# Patient Record
Sex: FEMALE | Race: WHITE | Employment: PART TIME | URBAN - METROPOLITAN AREA
[De-identification: names, ages, dates, MRNs, and addresses within clinical notes are randomized per-mention and may not be internally consistent; named-entity substitution may affect disease eponyms.]

---

## 2018-03-30 ENCOUNTER — OFFICE VISIT (OUTPATIENT)
Dept: OBGYN CLINIC | Age: 33
End: 2018-03-30

## 2018-03-30 VITALS
BODY MASS INDEX: 21.26 KG/M2 | DIASTOLIC BLOOD PRESSURE: 60 MMHG | SYSTOLIC BLOOD PRESSURE: 112 MMHG | WEIGHT: 120 LBS | HEIGHT: 63 IN

## 2018-03-30 DIAGNOSIS — O34.219 PREVIOUS CESAREAN DELIVERY, ANTEPARTUM CONDITION OR COMPLICATION: Primary | ICD-10-CM

## 2018-03-30 DIAGNOSIS — Z32.01 PREGNANCY EXAMINATION OR TEST, POSITIVE RESULT: ICD-10-CM

## 2018-03-30 DIAGNOSIS — Z34.82 ENCOUNTER FOR SUPERVISION OF OTHER NORMAL PREGNANCY, SECOND TRIMESTER: ICD-10-CM

## 2018-03-30 DIAGNOSIS — N92.6 MISSED MENSES: ICD-10-CM

## 2018-03-30 LAB
ANTIBODY SCREEN, EXTERNAL: NEGATIVE
CHLAMYDIA, EXTERNAL: NEGATIVE
HBSAG, EXTERNAL: NEGATIVE
HCT, EXTERNAL: 34.9
HGB, EXTERNAL: 11.6
HIV, EXTERNAL: NEGATIVE
N. GONORRHEA, EXTERNAL: NEGATIVE
PAP SMEAR, EXTERNAL: NORMAL
PLATELET CNT,   EXTERNAL: 239
RUBELLA, EXTERNAL: 2.59
T. PALLIDUM, EXTERNAL: NEGATIVE
TYPE, ABO & RH, EXTERNAL: NORMAL
URINALYSIS, EXTERNAL: NEGATIVE

## 2018-03-30 NOTE — PROGRESS NOTES
Current pregnancy history:    Yoly Talamantes is a 28 y.o. female who presents for the evaluation of pregnancy. Patient's last menstrual period was 2017 (exact date). LMP history:  The date of her LMP is certain. Her last menstrual period was normal and lasted for 4 to 5 days. A urine pregnancy test was positive several weeks ago. She was not on the pill at conception. Based on her LMP, her EDC is 18 and her EGA is 13 weeks, 6 days. Her menstrual cycles are regular and occur approximately every 28 days  and range from 3 to 5 days. The last menses lasted the usual number of days. Ultrasound data:  She had an  ultrasound done by the ultrasound tech today which revealed a viable balderrama pregnancy with a gestational age of 12 weeks and 5 days giving an [address] of 18. Pregnancy symptoms:    Since her LMP she has experienced  urinary frequency, breast tenderness, and nausea. She has been vomiting over the last few weeks. Associated signs and symptoms which she denies: dysuria, discharge, vaginal bleeding. She states she has not gained weight:  Approximately 0 pounds over the last few weeks. Relevant past pregnancy history:   She has the following pregnancy history Her last pregnancy was uncomplicated scheduled repeat C/S. She has no history of  delivery. Relevant past medical history:(relevant to this pregnancy): noncontributory. Pap/Occupational history:  Last pap smear: 3 years ago Results: Normal      Her occupation is: Works at [employer]. Substance history: negative for alcohol and street drugs. Does smoke tobacco            Positive for nothing else  Exposure history: There is/are no indoor cat/s in the home. The patient was instructed to not change the cat litter. She admits close contact with children on a regular basis. She has had chicken pox or the vaccine in the past.   Patient denies issues with domestic violence.      Genetic Screening/Teratology Counseling: (Includes patient, baby's father, or anyone in either family with:)  3.  Patient's age >/= 28 at [facility]?-- no  .   2. Thalassemia ([facility] [place], [address], or  background): MCV<80?--no.     3.  Neural tube defect (meningomyelocele, spina bifida, anencephaly)?--no.   4.  Congenital heart defect?--no.  5.  Down syndrome?--no.   6.  Adrian-Sachs (Latter day, [place])?--no.   7.  Canavan's Disease?--no.   8.  Familial Dysautonomia?--no.   9.  Sickle cell disease or trait ()? --no   The patient has not been tested for sickle trait  10. Hemophilia or other blood disorders?--no. 11.  Muscular dystrophy?--no. 12.  Cystic fibrosis?--no. 13.  [place]'s Chorea?--no. 14.  Mental retardation/autism (if yes was person tested for Fragile X)?--no. 15.  Other inherited genetic or chromosomal disorder?--no. 12.  Maternal metabolic disorder (DM, PKU, etc)?--no. 17.  Patient or FOB with a child with a birth defect not listed above?--no.  17a. Patient or FOB with a birth defect themselves?--no. 18.  Recurrent pregnancy loss, or stillbirth?--no. 19.  Any medications since LMP other than prenatal vitamins (include vitamins, supplements, OTC meds, drugs, alcohol)?--no. 20.  Any other genetic/environmental exposure to discuss?--no. Infection History:  1. Lives with someone with TB or TB exposed?--no.   2.  Patient or partner has history of genital herpes?--no.  3.  Rash or viral illness since LMP?--no.    4.  History of STD (GC, CT, HPV, syphilis, HIV)? --Trich  5. Other: OTHER? Past Medical History:   Diagnosis Date    Abnormal Pap smear 11    LGSIL + HPV    ADHD (attention deficit hyperactivity disorder)     HPV test positive 11    Postpartum depression     Psychiatric problem     drug addiction, initial dependency 5 years ago. .methadone [place]    Trichomonas infection 2014     Past Surgical History:   Procedure Laterality Date     DELIVERY ONLY  2008    HX DILATION AND CURETTAGE      HX OTHER SURGICAL      tonsils    HX OTHER SURGICAL      wisdom teeth extraction     Social History     Occupational History    Not on file. Social History Main Topics    Smoking status: Current Every Day Smoker     Packs/day: 1.00     Years: 8.00    Smokeless tobacco: Never Used    Alcohol use No    Drug use: Yes     Special: Other    Sexual activity: Yes     Partners: Male     Birth control/ protection: None      Comment: methadone     Family History   Problem Relation Age of Onset    Drug Abuse Mother     Drug Abuse Father        Allergies   Allergen Reactions    Cephalosporins Hives    Codeine Hives    Pcn [Penicillins] Hives     Prior to Admission medications    Medication Sig Start Date End Date Taking? Authorizing Provider   albuterol (PROVENTIL HFA, VENTOLIN HFA, PROAIR HFA) 90 mcg/actuation inhaler Take 1-2 Puffs by inhalation every four (4) hours as needed for Wheezing. 9/27/15   Pieter Levering Flasschoen, PA   Dextromethorphan-guaiFENesin [facility] DM) 60-1,200 mg TM12 Take 1 Tab by mouth every twelve (12) hours as needed (congestion). 9/27/15   Pieter Levering Flasschoen, PA   amphetamine-dextroamphetamine (ADDERALL) 20 mg tablet Take 20 mg by mouth two (2) times a day.     Indications: ATTENTION-DEFICIT HYPERACTIVITY DISORDER    Historical Provider        Review of Systems: History obtained from the patient  Constitutional: negative for weight loss, fever, night sweats  HEENT: negative for hearing loss, earache, congestion, snoring, sorethroat  CV: negative for chest pain, palpitations, edema  Resp: negative for cough, shortness of breath, wheezing  Breast: negative for breast lumps, nipple discharge, galactorrhea  GI: negative for change in bowel habits, abdominal pain, black or bloody stools  : negative for frequency, dysuria, hematuria, vaginal discharge  MSK: negative for back pain, joint pain, muscle pain  Skin: negative for itching, rash, hives  Neuro: negative for dizziness, headache, confusion, weakness  Psych: negative for anxiety, depression, change in mood  Heme/lymph: negative for bleeding, bruising, pallor    Objective:  Visit Vitals    /60    Ht 5' 3\" (1.6 m)    Wt 120 lb (54.4 kg)    LMP 12/23/2017 (Exact Date)    BMI 21.26 kg/m2       Physical Exam:   PHYSICAL EXAMINATION    Constitutional  · Appearance: well-nourished, well developed, alert, in no acute distress    HENT  · Head  · Face: appears normal  · Eyes: appear normal  · Ears: normal  · Mouth: normal  · Lips: no lesions    Neck  · Inspection/Palpation: normal appearance, no masses or tenderness  · Lymph Nodes: no lymphadenopathy present  · Thyroid: gland size normal, nontender, no nodules or masses present on palpation    Chest  · Respiratory Effort: breathing unlabored  · Auscultation: normal breath sounds    Cardiovascular  · Heart:  · Auscultation: regular rate and rhythm without murmur    Breasts  · Inspection of Breasts: breasts symmetrical, no skin changes, no discharge present, nipple appearance normal, no skin retraction present  · Palpation of Breasts and Axillae: no masses present on palpation, no breast tenderness  · Axillary Lymph Nodes: no lymphadenopathy present    Gastrointestinal  · Abdominal Examination: abdomen non-tender to palpation, normal bowel sounds, no masses present  · Liver and spleen: no hepatomegaly present, spleen not palpable  · Hernias: no hernias identified    Genitourinary  · External Genitalia: normal appearance for age, no discharge present, no tenderness present, no inflammatory lesions present, no masses present, no atrophy present  · Vagina: normal vaginal vault without central or paravaginal defects, no discharge present, no inflammatory lesions present, no masses present  · Bladder: non-tender to palpation  · Urethra: appears normal  · Cervix: normal   · Uterus: enlarged, normal shape, soft  · Adnexa: no adnexal tenderness present, no adnexal masses present  · Perineum: perineum within normal limits, no evidence of trauma, no rashes or skin lesions present  · Anus: anus within normal limits, no hemorrhoids present  · Inguinal Lymph Nodes: no lymphadenopathy present    Skin  · General Inspection: no rash, no lesions identified    Neurologic/Psychiatric  · Mental Status:  · Orientation: grossly oriented to person, place and time  · Mood and Affect: mood normal, affect appropriate    Assessment:   Intrauterine pregnancy with the following problems identified: Previous C/S X 2 for repeat/ on Suboxone 8 mg/smoker        Plan:   Discussed weaning off suboxone during pregnancy. Advised not usually recommended but OK with me if OK with prescriber. Offered CF testing, CVS, Nuchal Translucency, MSAFP, amnio, and discussed NIPT  Course of pregnancy discussed including visit schedule, routine U/S, glucola testing, etc.  Avoid alcoholic beverages and illicit/recreational drugs use  Take prenatal vitamins or folic acid daily. Hospital and practice style discussed with coverage system. Discussed nutrition, toxoplasmosis precautions, sexual activity, exercise, need for influenza vaccine, environmental and work hazards, travel advice, screen for domestic violence, need for seat belts. Discussed seafood, unpasteurized dairy products, deli meat, artificial sweeteners, and caffeine. Information on prenatal classes/breastfeeding given. Information on circumcision given  Patient encouraged not to smoke. Discussed current prescription drug use. Given medication list.  Discussed the use of over the counter medications and chemicals. Route of delivery discussed, including risks, benefits, and alternatives of  versus repeat LTCS. Pt understands risk of hemorrhage during pregnancy and post delivery and would accept blood products if necessary in life-threatening emergencies      Handouts given to pt.

## 2018-04-01 LAB — BACTERIA UR CULT: NO GROWTH

## 2018-04-03 LAB
ABO GROUP BLD: NORMAL
BLD GP AB SCN SERPL QL: NEGATIVE
ERYTHROCYTE [DISTWIDTH] IN BLOOD BY AUTOMATED COUNT: 13.4 % (ref 12.3–15.4)
HBV SURFACE AG SERPL QL IA: NEGATIVE
HCT VFR BLD AUTO: 34.9 % (ref 34–46.6)
HGB BLD-MCNC: 11.6 G/DL (ref 11.1–15.9)
HIV 1+2 AB+HIV1 P24 AG SERPL QL IA: NON REACTIVE
MCH RBC QN AUTO: 31.8 PG (ref 26.6–33)
MCHC RBC AUTO-ENTMCNC: 33.2 G/DL (ref 31.5–35.7)
MCV RBC AUTO: 96 FL (ref 79–97)
PLATELET # BLD AUTO: 239 X10E3/UL (ref 150–379)
RBC # BLD AUTO: 3.65 X10E6/UL (ref 3.77–5.28)
RH BLD: POSITIVE
RUBV IGG SERPL IA-ACNC: 2.59 INDEX
T PALLIDUM AB SER QL IA: NEGATIVE
WBC # BLD AUTO: 9.8 X10E3/UL (ref 3.4–10.8)

## 2018-04-04 LAB
C TRACH RRNA CVX QL NAA+PROBE: NEGATIVE
CYTOLOGIST CVX/VAG CYTO: ABNORMAL
CYTOLOGY CVX/VAG DOC THIN PREP: ABNORMAL
CYTOLOGY HISTORY:: ABNORMAL
DX ICD CODE: ABNORMAL
DX ICD CODE: ABNORMAL
HPV I/H RISK 4 DNA CVX QL PROBE+SIG AMP: POSITIVE
Lab: ABNORMAL
N GONORRHOEA RRNA CVX QL NAA+PROBE: NEGATIVE
OTHER STN SPEC: ABNORMAL
PATH REPORT.FINAL DX SPEC: ABNORMAL
PATHOLOGIST CVX/VAG CYTO: ABNORMAL
STAT OF ADQ CVX/VAG CYTO-IMP: ABNORMAL
T VAGINALIS RRNA SPEC QL NAA+PROBE: NEGATIVE

## 2018-04-06 ENCOUNTER — TELEPHONE (OUTPATIENT)
Dept: OBGYN CLINIC | Age: 33
End: 2018-04-06

## 2018-04-06 ENCOUNTER — LAB ONLY (OUTPATIENT)
Dept: OBGYN CLINIC | Age: 33
End: 2018-04-06

## 2018-04-06 DIAGNOSIS — Z34.82 ENCOUNTER FOR SUPERVISION OF OTHER NORMAL PREGNANCY IN SECOND TRIMESTER: Primary | ICD-10-CM

## 2018-04-06 LAB — AFPT, MATERNAL, EXTERNAL: NEGATIVE

## 2018-04-06 NOTE — LETTER
4/6/2018 4:00 PM 
 
Ms. Dylon Syed [address] [address] To Dental Consultant, 
 
Dylon Syed is in need of dental care. This patient is obstetrically cleared for dental procedures with the following considerations: 1. Please use an abdominal and thyroid shield when performing dental x-rays. 2.  Broad spectrum Penicillins or Cephalosporins and Tylenol products my be 
                 used as needed. Tetracyclines, Nsaids and Quinolones are contraindicated  
                 in pregnancy. 3.  Local Anesthesia without Epinepherine is permissible. Sincerely, Gregg Ambrocio MD

## 2018-04-10 LAB
2ND TRIMESTER 4 SCREEN SERPL-IMP: NORMAL
2ND TRIMESTER 4 SCREEN SERPL-IMP: NORMAL
AFP ADJ MOM SERPL: 0.83
AFP SERPL-MCNC: 29.1 NG/ML
AGE AT DELIVERY: 32.9 YR
COMMENTS, 018014: NORMAL
FET TS 18 RISK FROM MAT AGE: NORMAL
FET TS 21 RISK FROM MAT AGE: 452
GA METHOD: NORMAL
GA: 15.5 WEEKS
HCG ADJ MOM SERPL: 0.82
HCG SERPL-ACNC: NORMAL MIU/ML
IDDM PATIENT QL: NO
INHIBIN A ADJ MOM SERPL: 0.75
INHIBIN A SERPL-MCNC: 150.73 PG/ML
MULTIPLE PREGNANCY: NO
NEURAL TUBE DEFECT RISK FETUS: NORMAL %
RESULTS, 017389: NORMAL
TS 18 RISK FETUS: NORMAL
TS 21 RISK FETUS: 2822
U ESTRIOL ADJ MOM SERPL: 0.77
U ESTRIOL SERPL-MCNC: 0.54 NG/ML

## 2018-04-10 NOTE — PROGRESS NOTES
Notified pt of results. She verbalized understanding. Colpo will be performed at her next appointment.

## 2018-05-04 ENCOUNTER — ROUTINE PRENATAL (OUTPATIENT)
Dept: OBGYN CLINIC | Age: 33
End: 2018-05-04

## 2018-05-04 VITALS
BODY MASS INDEX: 21.62 KG/M2 | HEIGHT: 63 IN | SYSTOLIC BLOOD PRESSURE: 98 MMHG | WEIGHT: 122 LBS | DIASTOLIC BLOOD PRESSURE: 60 MMHG

## 2018-05-04 DIAGNOSIS — R87.612 LOW GRADE SQUAMOUS INTRAEPITHELIAL LESION (LGSIL) ON PAPANICOLAOU SMEAR OF CERVIX: ICD-10-CM

## 2018-05-04 DIAGNOSIS — Z34.82 ENCOUNTER FOR SUPERVISION OF OTHER NORMAL PREGNANCY, SECOND TRIMESTER: Primary | ICD-10-CM

## 2018-05-04 DIAGNOSIS — R87.810 CERVICAL HIGH RISK HPV (HUMAN PAPILLOMAVIRUS) TEST POSITIVE: ICD-10-CM

## 2018-05-04 NOTE — PROGRESS NOTES
[facility]  OFFICE PROCEDURE PROGRESS NOTE           Chart reviewed for the following:  Maddie YUSUF, have reviewed the History, Physical and updated the Allergic reactions for Sandrine performed immediately prior to start of procedure:  Josee Valencia, have performed the following reviews on Delwyn July prior to the start of the procedure:      * Patient was identified by name and date of birth   * Agreement on procedure being performed was verified  * Risks and Benefits explained to the patient  * Procedure site verified and marked as necessary  * Patient was positioned for comfort  * Consent was signed and verified      Time: 9:40am        Date of procedure: 5/4/2018     Procedure performed by: Gregg Ambrocio MD     Provider assisted by: Maddie Arvizu MA     Patient assisted by: self     How tolerated by patient: tolerated the procedure well with no complications     Post Procedural Pain Scale: 0 - No Hurt     Comments: none    Procedure Note: Colposcopy during pregnancy    Delwyn July is a G6 ,  28 y.o. female [facility] Patient's last menstrual period was 12/23/2017 (exact date). She presents for colposcopy for evaluation of a cervical abnormality with a pap smear abnormality consisting of HPV and LGSIL. The patient is pregnant. After being presented with the risks, benefits and alternatives has she signed a consent for the procedure. She states that she understands the need for the procedure and has no further questions. She was informed that she may experience discomfort. Procedure:  She was positioned in the dorsal lithotomy position and a speculum was inserted into the vagina. Dilute acetic acid was applied to the cervix. The colposcope was used to visualize the cervix. Procedure: The transformation zone was completely visualized. Findings: This colposcopy was satisfactory.  The procedure was notable for white epithelium on the posterior cervix. Biopsies were not taken from the cervix. See accompanying diagram. Nothing was applied to the cervix for hemostasis. Endocervical currettage: An endocervical curettage was not performed. Post Procedure Status: The patient tolerated the procedure well with minimal discomfort. She was observed for 10 minutes and released in good condition.

## 2018-06-01 ENCOUNTER — ROUTINE PRENATAL (OUTPATIENT)
Dept: OBGYN CLINIC | Age: 33
End: 2018-06-01

## 2018-06-01 VITALS
WEIGHT: 121 LBS | BODY MASS INDEX: 21.44 KG/M2 | HEIGHT: 63 IN | SYSTOLIC BLOOD PRESSURE: 116 MMHG | DIASTOLIC BLOOD PRESSURE: 60 MMHG

## 2018-06-01 DIAGNOSIS — Z34.82 ENCOUNTER FOR SUPERVISION OF OTHER NORMAL PREGNANCY, SECOND TRIMESTER: Primary | ICD-10-CM

## 2018-07-02 ENCOUNTER — ROUTINE PRENATAL (OUTPATIENT)
Dept: OBGYN CLINIC | Age: 33
End: 2018-07-02

## 2018-07-02 VITALS
DIASTOLIC BLOOD PRESSURE: 60 MMHG | SYSTOLIC BLOOD PRESSURE: 108 MMHG | BODY MASS INDEX: 22.15 KG/M2 | WEIGHT: 125 LBS | HEIGHT: 63 IN

## 2018-07-02 DIAGNOSIS — Z34.83 ENCOUNTER FOR SUPERVISION OF OTHER NORMAL PREGNANCY IN THIRD TRIMESTER: Primary | ICD-10-CM

## 2018-07-02 DIAGNOSIS — Z23 ENCOUNTER FOR IMMUNIZATION: ICD-10-CM

## 2018-07-02 LAB
GTT, 1 HR, GLUCOLA, EXTERNAL: 88
HCT, EXTERNAL: 32.6
HGB, EXTERNAL: 10.7
PLATELET CNT,   EXTERNAL: 222

## 2018-07-02 NOTE — PROGRESS NOTES
28 year   28w1d pregnant patient given 0.5ml t-dap injection as per MD order. Patient signed consent. Patient tolerated injection in right deltoid with out complications.

## 2018-07-03 LAB
ERYTHROCYTE [DISTWIDTH] IN BLOOD BY AUTOMATED COUNT: 13.9 % (ref 12.3–15.4)
GLUCOSE 1H P 50 G GLC PO SERPL-MCNC: 88 MG/DL (ref 65–129)
HCT VFR BLD AUTO: 32.6 % (ref 34–46.6)
HGB BLD-MCNC: 10.7 G/DL (ref 11.1–15.9)
MCH RBC QN AUTO: 31.8 PG (ref 26.6–33)
MCHC RBC AUTO-ENTMCNC: 32.8 G/DL (ref 31.5–35.7)
MCV RBC AUTO: 97 FL (ref 79–97)
PLATELET # BLD AUTO: 222 X10E3/UL (ref 150–379)
RBC # BLD AUTO: 3.36 X10E6/UL (ref 3.77–5.28)
WBC # BLD AUTO: 13.1 X10E3/UL (ref 3.4–10.8)

## 2018-07-30 ENCOUNTER — ROUTINE PRENATAL (OUTPATIENT)
Dept: OBGYN CLINIC | Age: 33
End: 2018-07-30

## 2018-07-30 VITALS
WEIGHT: 128 LBS | SYSTOLIC BLOOD PRESSURE: 104 MMHG | HEIGHT: 63 IN | DIASTOLIC BLOOD PRESSURE: 60 MMHG | BODY MASS INDEX: 22.68 KG/M2

## 2018-07-30 DIAGNOSIS — Z34.83 ENCOUNTER FOR SUPERVISION OF OTHER NORMAL PREGNANCY IN THIRD TRIMESTER: Primary | ICD-10-CM

## 2018-08-13 ENCOUNTER — ROUTINE PRENATAL (OUTPATIENT)
Dept: OBGYN CLINIC | Age: 33
End: 2018-08-13

## 2018-08-13 VITALS
DIASTOLIC BLOOD PRESSURE: 60 MMHG | WEIGHT: 132 LBS | SYSTOLIC BLOOD PRESSURE: 112 MMHG | BODY MASS INDEX: 23.39 KG/M2 | HEIGHT: 63 IN

## 2018-08-13 DIAGNOSIS — Z34.83 ENCOUNTER FOR SUPERVISION OF OTHER NORMAL PREGNANCY IN THIRD TRIMESTER: Primary | ICD-10-CM

## 2018-08-21 ENCOUNTER — ROUTINE PRENATAL (OUTPATIENT)
Dept: OBGYN CLINIC | Age: 33
End: 2018-08-21

## 2018-08-21 VITALS
SYSTOLIC BLOOD PRESSURE: 95 MMHG | HEART RATE: 85 BPM | WEIGHT: 131 LBS | HEIGHT: 63 IN | BODY MASS INDEX: 23.21 KG/M2 | DIASTOLIC BLOOD PRESSURE: 43 MMHG

## 2018-08-21 DIAGNOSIS — Z34.83 ENCOUNTER FOR SUPERVISION OF OTHER NORMAL PREGNANCY IN THIRD TRIMESTER: Primary | ICD-10-CM

## 2018-08-21 LAB — GRBS, EXTERNAL: NEGATIVE

## 2018-08-21 NOTE — PATIENT INSTRUCTIONS

## 2018-08-24 LAB — GP B STREP DNA SPEC QL NAA+PROBE: NEGATIVE

## 2018-08-30 ENCOUNTER — ROUTINE PRENATAL (OUTPATIENT)
Dept: OBGYN CLINIC | Age: 33
End: 2018-08-30

## 2018-08-30 VITALS
DIASTOLIC BLOOD PRESSURE: 60 MMHG | WEIGHT: 132 LBS | HEIGHT: 63 IN | BODY MASS INDEX: 23.39 KG/M2 | SYSTOLIC BLOOD PRESSURE: 102 MMHG

## 2018-08-30 DIAGNOSIS — Z34.83 ENCOUNTER FOR SUPERVISION OF OTHER NORMAL PREGNANCY IN THIRD TRIMESTER: Primary | ICD-10-CM

## 2018-09-06 ENCOUNTER — ROUTINE PRENATAL (OUTPATIENT)
Dept: OBGYN CLINIC | Age: 33
End: 2018-09-06

## 2018-09-06 VITALS
WEIGHT: 132.25 LBS | HEIGHT: 63 IN | DIASTOLIC BLOOD PRESSURE: 58 MMHG | SYSTOLIC BLOOD PRESSURE: 106 MMHG | BODY MASS INDEX: 23.43 KG/M2

## 2018-09-06 DIAGNOSIS — Z34.83 ENCOUNTER FOR SUPERVISION OF OTHER NORMAL PREGNANCY IN THIRD TRIMESTER: Primary | ICD-10-CM

## 2018-09-13 ENCOUNTER — ROUTINE PRENATAL (OUTPATIENT)
Dept: OBGYN CLINIC | Age: 33
End: 2018-09-13

## 2018-09-13 VITALS — BODY MASS INDEX: 23.38 KG/M2 | WEIGHT: 132 LBS | SYSTOLIC BLOOD PRESSURE: 104 MMHG | DIASTOLIC BLOOD PRESSURE: 60 MMHG

## 2018-09-13 DIAGNOSIS — Z3A.38 38 WEEKS GESTATION OF PREGNANCY: ICD-10-CM

## 2018-09-13 DIAGNOSIS — Z34.83 ENCOUNTER FOR SUPERVISION OF OTHER NORMAL PREGNANCY, THIRD TRIMESTER: ICD-10-CM

## 2018-09-13 DIAGNOSIS — O34.212 ENCOUNTER FOR MATERNAL CARE FOR VERTICAL SCAR FROM PREVIOUS CESAREAN DELIVERY: Primary | ICD-10-CM

## 2018-09-13 RX ORDER — BUPRENORPHINE HYDROCHLORIDE 8 MG/1
8 TABLET SUBLINGUAL 3 TIMES DAILY
COMMUNITY

## 2018-09-13 NOTE — LETTER
2018 3:13 PM 
 
Ms. Moshe Patterson [address] [address] To whom it may concern, 
 
Ms. Moshe Patterson will start maternity leave on 2018. Please call our office with any questions. Sincerely, Wylie Apgar, MD

## 2018-09-14 ENCOUNTER — ANESTHESIA (OUTPATIENT)
Dept: LABOR AND DELIVERY | Age: 33
DRG: 540 | End: 2018-09-14
Payer: MEDICAID

## 2018-09-14 ENCOUNTER — ANESTHESIA EVENT (OUTPATIENT)
Dept: LABOR AND DELIVERY | Age: 33
DRG: 540 | End: 2018-09-14
Payer: MEDICAID

## 2018-09-14 ENCOUNTER — HOSPITAL ENCOUNTER (INPATIENT)
Age: 33
LOS: 3 days | Discharge: HOME | DRG: 540 | End: 2018-09-17
Attending: OBSTETRICS & GYNECOLOGY | Admitting: OBSTETRICS & GYNECOLOGY
Payer: MEDICAID

## 2018-09-14 LAB
ABO + RH BLD: NORMAL
AMPHET UR QL SCN: NEGATIVE
BARBITURATES UR QL SCN: NEGATIVE
BASOPHILS # BLD: 0.1 K/UL (ref 0–0.1)
BASOPHILS NFR BLD: 0 % (ref 0–1)
BENZODIAZ UR QL: NEGATIVE
BLOOD GROUP ANTIBODIES SERPL: NORMAL
CANNABINOIDS UR QL SCN: NEGATIVE
COCAINE UR QL SCN: NEGATIVE
DAILY QC (YES/NO)?: YES
DIFFERENTIAL METHOD BLD: ABNORMAL
DRUG SCRN COMMENT,DRGCM: NORMAL
EOSINOPHIL # BLD: 0.1 K/UL (ref 0–0.4)
EOSINOPHIL NFR BLD: 1 % (ref 0–7)
ERYTHROCYTE [DISTWIDTH] IN BLOOD BY AUTOMATED COUNT: 14 % (ref 11.5–14.5)
HCT VFR BLD AUTO: 31.8 % (ref 35–47)
HGB BLD-MCNC: 10.9 G/DL (ref 11.5–16)
IMM GRANULOCYTES # BLD: 0.2 K/UL (ref 0–0.04)
IMM GRANULOCYTES NFR BLD AUTO: 1 % (ref 0–0.5)
LYMPHOCYTES # BLD: 2.2 K/UL (ref 0.8–3.5)
LYMPHOCYTES NFR BLD: 18 % (ref 12–49)
MCH RBC QN AUTO: 33.1 PG (ref 26–34)
MCHC RBC AUTO-ENTMCNC: 34.3 G/DL (ref 30–36.5)
MCV RBC AUTO: 96.7 FL (ref 80–99)
METHADONE UR QL: NEGATIVE
MONOCYTES # BLD: 0.8 K/UL (ref 0–1)
MONOCYTES NFR BLD: 7 % (ref 5–13)
NEUTS SEG # BLD: 8.5 K/UL (ref 1.8–8)
NEUTS SEG NFR BLD: 72 % (ref 32–75)
NRBC # BLD: 0 K/UL (ref 0–0.01)
NRBC BLD-RTO: 0 PER 100 WBC
OPIATES UR QL: NEGATIVE
PCP UR QL: NEGATIVE
PH, VAGINAL FLUID: 7 (ref 5–6.1)
PLATELET # BLD AUTO: 238 K/UL (ref 150–400)
PMV BLD AUTO: 10.1 FL (ref 8.9–12.9)
RBC # BLD AUTO: 3.29 M/UL (ref 3.8–5.2)
SPECIMEN EXP DATE BLD: NORMAL
WBC # BLD AUTO: 11.9 K/UL (ref 3.6–11)

## 2018-09-14 PROCEDURE — 36415 COLL VENOUS BLD VENIPUNCTURE: CPT | Performed by: OBSTETRICS & GYNECOLOGY

## 2018-09-14 PROCEDURE — 74011250636 HC RX REV CODE- 250/636: Performed by: ANESTHESIOLOGY

## 2018-09-14 PROCEDURE — 74011250636 HC RX REV CODE- 250/636

## 2018-09-14 PROCEDURE — 74011250637 HC RX REV CODE- 250/637: Performed by: OBSTETRICS & GYNECOLOGY

## 2018-09-14 PROCEDURE — 76010000391 HC C SECN FIRST 1 HR: Performed by: OBSTETRICS & GYNECOLOGY

## 2018-09-14 PROCEDURE — 74011250636 HC RX REV CODE- 250/636: Performed by: OBSTETRICS & GYNECOLOGY

## 2018-09-14 PROCEDURE — 65270000029 HC RM PRIVATE

## 2018-09-14 PROCEDURE — 83986 ASSAY PH BODY FLUID NOS: CPT | Performed by: OBSTETRICS & GYNECOLOGY

## 2018-09-14 PROCEDURE — 77030007866 HC KT SPN ANES BBMI -B: Performed by: ANESTHESIOLOGY

## 2018-09-14 PROCEDURE — 86850 RBC ANTIBODY SCREEN: CPT | Performed by: OBSTETRICS & GYNECOLOGY

## 2018-09-14 PROCEDURE — 77030032490 HC SLV COMPR SCD KNE COVD -B

## 2018-09-14 PROCEDURE — 85025 COMPLETE CBC W/AUTO DIFF WBC: CPT | Performed by: OBSTETRICS & GYNECOLOGY

## 2018-09-14 PROCEDURE — 77030018836 HC SOL IRR NACL ICUM -A

## 2018-09-14 PROCEDURE — 75410000003 HC RECOV DEL/VAG/CSECN EA 0.5 HR: Performed by: OBSTETRICS & GYNECOLOGY

## 2018-09-14 PROCEDURE — 74011000250 HC RX REV CODE- 250

## 2018-09-14 PROCEDURE — 75410000002 HC LABOR FEE PER 1 HR: Performed by: OBSTETRICS & GYNECOLOGY

## 2018-09-14 PROCEDURE — 80307 DRUG TEST PRSMV CHEM ANLYZR: CPT | Performed by: OBSTETRICS & GYNECOLOGY

## 2018-09-14 PROCEDURE — 77030034850

## 2018-09-14 PROCEDURE — 77030008467 HC STPLR SKN COVD -B

## 2018-09-14 PROCEDURE — 76060000078 HC EPIDURAL ANESTHESIA: Performed by: OBSTETRICS & GYNECOLOGY

## 2018-09-14 PROCEDURE — 99283 EMERGENCY DEPT VISIT LOW MDM: CPT

## 2018-09-14 RX ORDER — DIPHENHYDRAMINE HCL 25 MG
25 CAPSULE ORAL
Status: DISCONTINUED | OUTPATIENT
Start: 2018-09-14 | End: 2018-09-17 | Stop reason: HOSPADM

## 2018-09-14 RX ORDER — SODIUM CHLORIDE 0.9 % (FLUSH) 0.9 %
5-10 SYRINGE (ML) INJECTION AS NEEDED
Status: DISCONTINUED | OUTPATIENT
Start: 2018-09-14 | End: 2018-09-14 | Stop reason: HOSPADM

## 2018-09-14 RX ORDER — OXYCODONE HYDROCHLORIDE 5 MG/1
10 TABLET ORAL
Status: DISCONTINUED | OUTPATIENT
Start: 2018-09-14 | End: 2018-09-14

## 2018-09-14 RX ORDER — DIPHENHYDRAMINE HYDROCHLORIDE 50 MG/ML
12.5 INJECTION, SOLUTION INTRAMUSCULAR; INTRAVENOUS
Status: DISPENSED | OUTPATIENT
Start: 2018-09-14 | End: 2018-09-15

## 2018-09-14 RX ORDER — SODIUM CHLORIDE 0.9 % (FLUSH) 0.9 %
5-10 SYRINGE (ML) INJECTION EVERY 8 HOURS
Status: DISCONTINUED | OUTPATIENT
Start: 2018-09-14 | End: 2018-09-15

## 2018-09-14 RX ORDER — MORPHINE SULFATE 0.5 MG/ML
INJECTION, SOLUTION EPIDURAL; INTRATHECAL; INTRAVENOUS AS NEEDED
Status: DISCONTINUED | OUTPATIENT
Start: 2018-09-14 | End: 2018-09-14 | Stop reason: HOSPADM

## 2018-09-14 RX ORDER — SODIUM CHLORIDE, SODIUM LACTATE, POTASSIUM CHLORIDE, CALCIUM CHLORIDE 600; 310; 30; 20 MG/100ML; MG/100ML; MG/100ML; MG/100ML
125 INJECTION, SOLUTION INTRAVENOUS CONTINUOUS
Status: DISCONTINUED | OUTPATIENT
Start: 2018-09-14 | End: 2018-09-17 | Stop reason: HOSPADM

## 2018-09-14 RX ORDER — IBUPROFEN 200 MG
1 TABLET ORAL DAILY
Status: DISCONTINUED | OUTPATIENT
Start: 2018-09-14 | End: 2018-09-17 | Stop reason: HOSPADM

## 2018-09-14 RX ORDER — SODIUM CHLORIDE 0.9 % (FLUSH) 0.9 %
5-10 SYRINGE (ML) INJECTION AS NEEDED
Status: DISCONTINUED | OUTPATIENT
Start: 2018-09-14 | End: 2018-09-17 | Stop reason: HOSPADM

## 2018-09-14 RX ORDER — ZOLPIDEM TARTRATE 5 MG/1
5 TABLET ORAL
Status: DISCONTINUED | OUTPATIENT
Start: 2018-09-14 | End: 2018-09-17 | Stop reason: HOSPADM

## 2018-09-14 RX ORDER — OXYTOCIN/RINGER'S LACTATE 20/1000 ML
125-500 PLASTIC BAG, INJECTION (ML) INTRAVENOUS ONCE
Status: ACTIVE | OUTPATIENT
Start: 2018-09-14 | End: 2018-09-15

## 2018-09-14 RX ORDER — OXYCODONE HYDROCHLORIDE 5 MG/1
5 TABLET ORAL
Status: DISCONTINUED | OUTPATIENT
Start: 2018-09-14 | End: 2018-09-14

## 2018-09-14 RX ORDER — CLINDAMYCIN PHOSPHATE 900 MG/50ML
900 INJECTION, SOLUTION INTRAVENOUS ONCE
Status: COMPLETED | OUTPATIENT
Start: 2018-09-14 | End: 2018-09-14

## 2018-09-14 RX ORDER — SODIUM CHLORIDE, SODIUM LACTATE, POTASSIUM CHLORIDE, CALCIUM CHLORIDE 600; 310; 30; 20 MG/100ML; MG/100ML; MG/100ML; MG/100ML
1000 INJECTION, SOLUTION INTRAVENOUS CONTINUOUS
Status: DISCONTINUED | OUTPATIENT
Start: 2018-09-14 | End: 2018-09-14 | Stop reason: HOSPADM

## 2018-09-14 RX ORDER — BUPRENORPHINE 2 MG/1
8 TABLET SUBLINGUAL 3 TIMES DAILY
Status: DISCONTINUED | OUTPATIENT
Start: 2018-09-15 | End: 2018-09-14

## 2018-09-14 RX ORDER — BUPRENORPHINE HYDROCHLORIDE 8 MG/1
24 TABLET SUBLINGUAL DAILY
Status: DISCONTINUED | OUTPATIENT
Start: 2018-09-15 | End: 2018-09-14

## 2018-09-14 RX ORDER — KETOROLAC TROMETHAMINE 30 MG/ML
30 INJECTION, SOLUTION INTRAMUSCULAR; INTRAVENOUS
Status: DISPENSED | OUTPATIENT
Start: 2018-09-14 | End: 2018-09-15

## 2018-09-14 RX ORDER — SWAB
1 SWAB, NON-MEDICATED MISCELLANEOUS DAILY
Status: DISCONTINUED | OUTPATIENT
Start: 2018-09-15 | End: 2018-09-17 | Stop reason: HOSPADM

## 2018-09-14 RX ORDER — NALOXONE HYDROCHLORIDE 0.4 MG/ML
0.2 INJECTION, SOLUTION INTRAMUSCULAR; INTRAVENOUS; SUBCUTANEOUS
Status: DISCONTINUED | OUTPATIENT
Start: 2018-09-14 | End: 2018-09-14

## 2018-09-14 RX ORDER — SIMETHICONE 80 MG
80 TABLET,CHEWABLE ORAL AS NEEDED
Status: DISCONTINUED | OUTPATIENT
Start: 2018-09-14 | End: 2018-09-17 | Stop reason: HOSPADM

## 2018-09-14 RX ORDER — ACETAMINOPHEN 325 MG/1
650 TABLET ORAL
Status: DISCONTINUED | OUTPATIENT
Start: 2018-09-14 | End: 2018-09-17 | Stop reason: HOSPADM

## 2018-09-14 RX ORDER — OXYTOCIN 10 [USP'U]/ML
INJECTION, SOLUTION INTRAMUSCULAR; INTRAVENOUS AS NEEDED
Status: DISCONTINUED | OUTPATIENT
Start: 2018-09-14 | End: 2018-09-14 | Stop reason: HOSPADM

## 2018-09-14 RX ORDER — KETOROLAC TROMETHAMINE 30 MG/ML
INJECTION, SOLUTION INTRAMUSCULAR; INTRAVENOUS AS NEEDED
Status: DISCONTINUED | OUTPATIENT
Start: 2018-09-14 | End: 2018-09-14 | Stop reason: HOSPADM

## 2018-09-14 RX ORDER — SODIUM CHLORIDE 0.9 % (FLUSH) 0.9 %
5-10 SYRINGE (ML) INJECTION EVERY 8 HOURS
Status: DISCONTINUED | OUTPATIENT
Start: 2018-09-14 | End: 2018-09-14 | Stop reason: HOSPADM

## 2018-09-14 RX ORDER — NALOXONE HYDROCHLORIDE 0.4 MG/ML
0.4 INJECTION, SOLUTION INTRAMUSCULAR; INTRAVENOUS; SUBCUTANEOUS AS NEEDED
Status: DISCONTINUED | OUTPATIENT
Start: 2018-09-14 | End: 2018-09-17 | Stop reason: HOSPADM

## 2018-09-14 RX ORDER — BUPIVACAINE HYDROCHLORIDE 7.5 MG/ML
INJECTION, SOLUTION EPIDURAL; RETROBULBAR AS NEEDED
Status: DISCONTINUED | OUTPATIENT
Start: 2018-09-14 | End: 2018-09-14 | Stop reason: HOSPADM

## 2018-09-14 RX ORDER — ONDANSETRON 2 MG/ML
INJECTION INTRAMUSCULAR; INTRAVENOUS AS NEEDED
Status: DISCONTINUED | OUTPATIENT
Start: 2018-09-14 | End: 2018-09-14 | Stop reason: HOSPADM

## 2018-09-14 RX ORDER — IBUPROFEN 800 MG/1
800 TABLET ORAL EVERY 8 HOURS
Status: DISCONTINUED | OUTPATIENT
Start: 2018-09-14 | End: 2018-09-17 | Stop reason: HOSPADM

## 2018-09-14 RX ORDER — DEXTROAMPHETAMINE SACCHARATE, AMPHETAMINE ASPARTATE, DEXTROAMPHETAMINE SULFATE AND AMPHETAMINE SULFATE 2.5; 2.5; 2.5; 2.5 MG/1; MG/1; MG/1; MG/1
20 TABLET ORAL 2 TIMES DAILY
Status: DISCONTINUED | OUTPATIENT
Start: 2018-09-14 | End: 2018-09-14

## 2018-09-14 RX ORDER — ADHESIVE BANDAGE
30 BANDAGE TOPICAL DAILY PRN
Status: DISCONTINUED | OUTPATIENT
Start: 2018-09-14 | End: 2018-09-17 | Stop reason: HOSPADM

## 2018-09-14 RX ORDER — FENTANYL CITRATE 50 UG/ML
INJECTION, SOLUTION INTRAMUSCULAR; INTRAVENOUS AS NEEDED
Status: DISCONTINUED | OUTPATIENT
Start: 2018-09-14 | End: 2018-09-14 | Stop reason: HOSPADM

## 2018-09-14 RX ORDER — BUPRENORPHINE 2 MG/1
8 TABLET SUBLINGUAL 3 TIMES DAILY
Status: DISCONTINUED | OUTPATIENT
Start: 2018-09-14 | End: 2018-09-17 | Stop reason: HOSPADM

## 2018-09-14 RX ADMIN — ONDANSETRON 4 MG: 2 INJECTION INTRAMUSCULAR; INTRAVENOUS at 13:34

## 2018-09-14 RX ADMIN — KETOROLAC TROMETHAMINE 30 MG: 30 INJECTION, SOLUTION INTRAMUSCULAR at 20:07

## 2018-09-14 RX ADMIN — DIPHENHYDRAMINE HYDROCHLORIDE 12.5 MG: 50 INJECTION, SOLUTION INTRAMUSCULAR; INTRAVENOUS at 20:48

## 2018-09-14 RX ADMIN — SODIUM CHLORIDE, SODIUM LACTATE, POTASSIUM CHLORIDE, AND CALCIUM CHLORIDE 1000 ML: 600; 310; 30; 20 INJECTION, SOLUTION INTRAVENOUS at 09:40

## 2018-09-14 RX ADMIN — SODIUM CHLORIDE, SODIUM LACTATE, POTASSIUM CHLORIDE, AND CALCIUM CHLORIDE: 600; 310; 30; 20 INJECTION, SOLUTION INTRAVENOUS at 13:51

## 2018-09-14 RX ADMIN — ZOLPIDEM TARTRATE 5 MG: 5 TABLET ORAL at 20:05

## 2018-09-14 RX ADMIN — OXYTOCIN 30 UNITS: 10 INJECTION, SOLUTION INTRAMUSCULAR; INTRAVENOUS at 13:34

## 2018-09-14 RX ADMIN — BUPIVACAINE HYDROCHLORIDE 1.6 ML: 7.5 INJECTION, SOLUTION EPIDURAL; RETROBULBAR at 13:17

## 2018-09-14 RX ADMIN — FENTANYL CITRATE 25 MCG: 50 INJECTION, SOLUTION INTRAMUSCULAR; INTRAVENOUS at 13:17

## 2018-09-14 RX ADMIN — BUPRENORPHINE HYDROCHLORIDE SUBLINGUAL 8 MG: 2 TABLET SUBLINGUAL at 17:14

## 2018-09-14 RX ADMIN — MORPHINE SULFATE 250 MCG: 0.5 INJECTION, SOLUTION EPIDURAL; INTRATHECAL; INTRAVENOUS at 13:17

## 2018-09-14 RX ADMIN — SODIUM CHLORIDE, SODIUM LACTATE, POTASSIUM CHLORIDE, AND CALCIUM CHLORIDE 1000 ML: 600; 310; 30; 20 INJECTION, SOLUTION INTRAVENOUS at 10:35

## 2018-09-14 RX ADMIN — KETOROLAC TROMETHAMINE 30 MG: 30 INJECTION, SOLUTION INTRAMUSCULAR; INTRAVENOUS at 13:57

## 2018-09-14 RX ADMIN — SODIUM CHLORIDE, SODIUM LACTATE, POTASSIUM CHLORIDE, AND CALCIUM CHLORIDE 1000 ML: 600; 310; 30; 20 INJECTION, SOLUTION INTRAVENOUS at 09:34

## 2018-09-14 RX ADMIN — CLINDAMYCIN PHOSPHATE 900 MG: 900 INJECTION, SOLUTION INTRAVENOUS at 13:25

## 2018-09-14 NOTE — DISCHARGE INSTRUCTIONS
Section  Procedure-specific postoperative instructions  General Instructions   Make an appointment to come back to the office in about 2 weeks if staples removed. If staples in place at discharge, make an appointment for removal within 7 days of discharge. Eat and drink your normal diet. Take laxatives as needed. Call us if you have questions or problems. Incision care   With this procedure you will have an incision to care for. It  may be sensitive one the ends with some sharp pains and it may feel numb in the middle. Ice packs or heating pad (low setting--don't burn yourself) can be helpful. Treat these incision like normal skin. You can shower and wash this skin as you normally would. You may have steri-strips, like strapping tape bandaids covering your incision. You can remove those 10-14 days after surgery. You need to call the office if there is spreading redness around the incision, it feels hot, or has drainage other than just a mild blood-tinged appearance. Activity   You have surprisingly few  restrictions. You can walk, lift less than 15 pounds, go up and down stairs, and generally do what you feel like you can do. Now, you may not FEEL like doing much. Any major surgery is a stress on your body. So you may be tired, have no energy, may feel weak and listless. But no damage will occur if you lift  less than 15 pounds or do moderate activity. Listen to your body. If it is telling you to rest, do so. If you feel good, you won't hurt anything by going shopping of doing household activities. Bleeding   You will have bleeding. It may vary from time to time, heavier with activity and lighter at other times. An occasional clot is normal.  Heavy bleeding, like changing a pad every half hour, is not normal.  Call the office if your bleeding is heavy over several hours. Bleeding will generally slow down over time.   By six weeks it should be nearly gone but sometimes some spotting will persist.   If you are breastfeeding you may not have a period for months and your bleeding may resolve more quickly.

## 2018-09-14 NOTE — PROGRESS NOTES
910-Pt arrived to unit with c/o leaking of clear fluid since she got up around 0800. Pt states she does feel like she is having some contractions but they are not painful at this time. Pt denies any other complaints. Pt states she was scheduled for a  section on Monday and she is planning to have a repeat c/s.    920-Nitrazine positive  925-During admission questions, pt states she is taking subutex. Writer will notify nursery and collect UDS.  938-Dr. Cait Cee called and reported that pt has Jake Salmon 668, MD states he will do her c/s at around 1130.  -Dr. Inocencia Blair at bedside to see pt and discuss POC, pt states to MD that she did have milk in her coffee this morning. MD states she would like to talk to Dr. Cait Cee about pushing back her C/S to 1300 due to the milk in her coffee. -Amina Myers spoke to Dr. Cait Cee about pt having milk in her coffee this morning and that she would like to push her c/s to 1300 if it was safe for the pt from a labor stand point. MD agreed and c/s will be at 1300 this afternoon. -Pt updated on POC at that her c/s is now scheduled for 1300. Pt verbalized understanding and has no questions or concerns. -Bedside report given to MELLISSA Soliman.

## 2018-09-14 NOTE — ANESTHESIA POSTPROCEDURE EVALUATION
Post-Anesthesia Evaluation and Assessment Patient: Catrachito Becerril MRN: [id number]  SSN: [id number] YOB: 1985  Age: 28 y.o. Sex: female Cardiovascular Function/Vital Signs Visit Vitals  BP 98/55  Pulse 67  Temp 36.9 °C (98.4 °F)  Resp 19  
 Ht 5' 3\" (1.6 m)  Wt 59.9 kg (132 lb)  SpO2 99%  Breastfeeding Unknown  BMI 23.38 kg/m2 Patient is status post spinal anesthesia for Procedure(s):  SECTION. Nausea/Vomiting: None Postoperative hydration reviewed and adequate. Pain: 
Pain Scale 1: Numeric (0 - 10) (18 1022) Pain Intensity 1: 1 (18 1022) Managed Neurological Status:  
Neuro (WDL): Within Defined Limits (18 1022) At baseline Mental Status and Level of Consciousness: Arousable Pulmonary Status:  
O2 Device: Nasal cannula (18 1400) Adequate oxygenation and airway patent Complications related to anesthesia: None Post-anesthesia assessment completed. No concerns Signed By: Guicho Kim MD   
 2018

## 2018-09-14 NOTE — ANESTHESIA PROCEDURE NOTES
Spinal Block Start time: 9/14/2018 1:11 PM 
End time: 9/14/2018 1:19 PM 
Performed by: Ashlee Chen Authorized by: Ashlee Chen Pre-procedure: Indications: at surgeon's request, procedure for pain and primary anesthetic  Preanesthetic Checklist: patient identified, risks and benefits discussed, anesthesia consent, site marked, patient being monitored and timeout performed Timeout Time: 13:11 Spinal Block:  
Patient Position:  Seated Prep Region:  Lumbar Prep: Betadine Location:  L2-3 Technique:  Single shot Local:  Lidocaine 1% Local Dose (mL):  3 Needle:  
Needle Type:  Pencil-tip Needle Gauge:  25 G Attempts:  1 Events: CSF confirmed, no blood with aspiration and no paresthesia Assessment: 
Insertion:  Uncomplicated Patient tolerance:  Patient tolerated the procedure well with no immediate complications

## 2018-09-14 NOTE — ANESTHESIA PREPROCEDURE EVALUATION
Anesthetic History No history of anesthetic complications Review of Systems / Medical History Patient summary reviewed, nursing notes reviewed and pertinent labs reviewed Pulmonary Asthma : well controlled Neuro/Psych Within defined limits Cardiovascular Within defined limits Exercise tolerance: >4 METS 
  
GI/Hepatic/Renal 
Within defined limits Endo/Other Within defined limits Other Findings Comments: H/o opioid addiction, relapse over 1 year ago. On Subtex ADHD Physical Exam 
 
Airway Mallampati: II 
TM Distance: 4 - 6 cm Neck ROM: normal range of motion Mouth opening: Normal 
 
 Cardiovascular Regular rate and rhythm,  S1 and S2 normal,  no murmur, click, rub, or gallop Rhythm: regular Rate: normal 
 
 
 
 Dental 
No notable dental hx Pulmonary Breath sounds clear to auscultation Abdominal 
GI exam deferred Other Findings Anesthetic Plan ASA: 2 Anesthesia type: spinal 
 
 
 
 
Induction: Intravenous Anesthetic plan and risks discussed with: Patient

## 2018-09-14 NOTE — OP NOTES
Section Delivery Procedure Note         Name: Marlo Waller      Medical Record Number: [id number]      YOB: 1985     Today's Date: 2018      Preoperative Diagnosis: post op     Postoperative Diagnosis: same    Procedure: Low Cervical Transverse Procedure(s):   SECTION    Surgeon(s): Jason Archer MD    [unfilled]    Anesthesia: Spinal    Prophylactic Antibiotics: clindamycin or Ancef         Fetal Description: balderrama female    Birth Information: Information for the patient's :  Alicia Andrew, Female [[id number]]   One Minute Apgar: 5 (Filed from Delivery Summary)  Five  Minute Apgar: 9 (Filed from Delivery Summary)      Umbilical Cord: normal    Placenta:  manual    Specimens:   ID Type Source Tests Collected by Time Destination   1 : Placenta and cord Placenta Uterus  Jason Archer MD 2018 1347 Discarded               Complications:  none    EBL: 600    Procedure Details: The patient was prepped with alcohol and draped with ioban in the supine position with a spinal  anesthetic. Brooks catheter had been placed using sterile technique. A Pfannenstiel incision was made, excising her old skin scar. The fascia was divided and then the rectus muscles were split in the midline. The peritoneum was entered well above the bladder without difficulty. The peritoneum over the lower uterine segment was incised and the bladder flap was developed. The bladder retractor was placed. The lower uterine segment was entered sharply with a single edge of the Metzenbaum scissors. Amniotomy was performed, the fluid was small amount clear. The infant was then delivered to chest level and the mouth and nares were suctioned. The remainder of the infant was delivered. The cord was clamped and cut and the infant handed off to the nursery staff. The placenta was delivered manually; it was normal and intact. It was not sent to pathology.   The uterus was cleared of blood, fluid, clot, and membranes and involuted with IV Pitocin given by anesthesia. The uterus was exteriorized and closed in a single, running interlocking layer of 1 Chromic. It was hemostatic. The posterior cul-de-sac was cleared of blood and fluid, then the uterus was replaced in the abdominal cavity. The gutters were irrigated with warm saline. The anterior peritoneum and rectus muscles were reapproximated in the midline with a suture of 1 chromic. The fascia was closed from left to right with 1 Stratafix in a running fashion. The subcutaneous space was irrigated after freeing up scar and adhesions and then the skin was closed with stainless steel staples. The final sponge and instrument counts were correct. The patient and infant were taken to the recovery room in good condition.     Signed By:  Dimitrios Guerrero MD     September 14, 2018

## 2018-09-14 NOTE — IP AVS SNAPSHOT
[address] [facility] 
 
 
 [address] [facility] 
[phone] Patient: Jennifer Horan MRN: [id number] :1985 About your hospitalization You were admitted on:  2018 You last received care in the:  [facility] [address] LABOR & DELIVERY You were discharged on:  2018 Why you were hospitalized Your primary diagnosis was:  Not on File Your diagnoses also included:  Delivery By  Section At 37-39 Weeks Of Gestation Due To Labor Follow-up Information Follow up With Details Comments Contact Info Juice Lima MD Go on 2018 at 1:50 PM for follow up on staples and NITIN dressing [address] [facility] 
[phone] Your Scheduled Appointments   1:50 PM EDT  
ESTABLISHED PATIENT with Juice Lima MD  
[place] ([address] [facility]) [address] [facility]  
[phone] Discharge Orders None A check del indicates which time of day the medication should be taken. My Medications START taking these medications Instructions Each Dose to Equal  
 Morning Noon Evening Bedtime  
 acetaminophen 325 mg tablet Commonly known as:  TYLENOL Your last dose was: Your next dose is: Take 2 Tabs by mouth every four (4) hours as needed. 650 mg  
    
   
   
   
  
 docusate sodium 100 mg capsule Commonly known as:  Itzel Rojas Your last dose was: Your next dose is: Take 1 Cap by mouth daily for 90 days. 100 mg  
    
   
   
   
  
 ibuprofen 800 mg tablet Commonly known as:  MOTRIN Your last dose was: Your next dose is: Take 1 Tab by mouth every eight (8) hours as needed for Pain. 800 mg CONTINUE taking these medications Instructions Each Dose to Equal  
 Morning Noon Evening Bedtime ADDERALL 20 mg tablet Generic drug:  dextroamphetamine-amphetamine Your last dose was: Your next dose is: Take 20 mg by mouth two (2) times a day. Indications: ATTENTION-DEFICIT HYPERACTIVITY DISORDER  
 20 mg  
    
   
   
   
  
 albuterol 90 mcg/actuation inhaler Commonly known as:  PROVENTIL HFA, VENTOLIN HFA, PROAIR HFA Your last dose was: Your next dose is: Take 1-2 Puffs by inhalation every four (4) hours as needed for Wheezing. 1-2 Puff  
    
   
   
   
  
 buprenorphine HCl 8 mg sublingual tablet Commonly known as:  SUBUTEX Your last dose was: Your next dose is:    
   
   
 by SubLINGual route daily. Take 2 and 1/2 by mouth daily Dextromethorphan-guaiFENesin 60-1,200 mg Tm12 Commonly known as:  [place] [address] DM Your last dose was: Your next dose is: Take 1 Tab by mouth every twelve (12) hours as needed (congestion). 1 Tab PNV No12-Iron-FA-DSS-OM-3 29 mg iron-1 mg -50 mg Cpkd Your last dose was: Your next dose is: Take  by mouth. Where to Get Your Medications Information on where to get these meds will be given to you by the nurse or doctor. ! Ask your nurse or doctor about these medications  
  acetaminophen 325 mg tablet  
 docusate sodium 100 mg capsule  
 ibuprofen 800 mg tablet Opioid Education Prescription Opioids: What You Need to Know: 
 
Prescription opioids can be used to help relieve moderate-to-severe pain and are often prescribed following a surgery or injury, or for certain health conditions. These medications can be an important part of treatment but also come with serious risks. Opioids are strong pain medicines.  Examples include hydrocodone, oxycodone, fentanyl, and morphine. Heroin is an example of an illegal opioid. It is important to work with your health care provider to make sure you are getting the safest, most effective care. WHAT ARE THE RISKS AND SIDE EFFECTS OF OPIOID USE? Prescription opioids carry serious risks of addiction and overdose, especially with prolonged use. An opioid overdose, often marked by slow breathing, can cause sudden death. The use of prescription opioids can have a number of side effects as well, even when taken as directed. · Tolerance-meaning you might need to take more of a medication for the same pain relief · Physical dependence-meaning you have symptoms of withdrawal when the medication is stopped. Withdrawal symptoms can include nausea, sweating, chills, diarrhea, stomach cramps, and muscle aches. Withdrawal can last up to several weeks, depending on which drug you took and how long you took it. · Increased sensitivity to pain · Constipation · Nausea, vomiting, and dry mouth · Sleepiness and dizziness · Confusion · Depression · Low levels of testosterone that can result in lower sex drive, energy, and strength · Itching and sweating RISKS ARE GREATER WITH:      
· History of drug misuse, substance use disorder, or overdose · Mental health conditions (such as depression or anxiety) · Sleep apnea · Older age (72 years or older) · Pregnancy Avoid alcohol while taking prescription opioids. Also, unless specifically advised by your health care provider, medications to avoid include: · Benzodiazepines (such as Xanax or Valium) · Muscle relaxants (such as Soma or Flexeril) · Hypnotics (such as Ambien or Lunesta) · Other prescription opioids KNOW YOUR OPTIONS Talk to your health care provider about ways to manage your pain that don't involve prescription opioids. Some of these options may actually work better and have fewer risks and side effects. Options may include: · Pain relievers such as acetaminophen, ibuprofen, and naproxen · Some medications that are also used for depression or seizures · Physical therapy and exercise · Counseling to help patients learn how to cope better with triggers of pain and stress. · Application of heat or cold compress · Massage therapy · Relaxation techniques Be Informed Make sure you know the name of your medication, how much and how often to take it, and its potential risks & side effects. IF YOU ARE PRESCRIBED OPIOIDS FOR PAIN: 
· Never take opioids in greater amounts or more often than prescribed. Remember the goal is not to be pain-free but to manage your pain at a tolerable level. · Follow up with your primary care provider to: · Work together to create a plan on how to manage your pain. · Talk about ways to help manage your pain that don't involve prescription opioids. · Talk about any and all concerns and side effects. · Help prevent misuse and abuse. · Never sell or share prescription opioids · Help prevent misuse and abuse. · Store prescription opioids in a secure place and out of reach of others (this may include visitors, children, friends, and family). · Safely dispose of unused/unwanted prescription opioids: Find your community drug take-back program or your pharmacy mail-back program, or flush them down the toilet, following guidance from the Food and Drug Administration (www.fda.gov/Drugs/ResourcesForYou). · Visit www.cdc.gov/drugoverdose to learn about the risks of opioid abuse and overdose. · If you believe you may be struggling with addiction, tell your health care provider and ask for guidance or call [employer] at 3-746-047-WRXG. Discharge Instructions  Section Procedure-specific postoperative instructions General Instructions Make an appointment to come back to the office in about 2 weeks if staples removed. If staples in place at discharge, make an appointment for removal within 7 days of discharge. Eat and drink your normal diet. Take laxatives as needed. Call us if you have questions or problems. Incision care With this procedure you will have an incision to care for. It  may be sensitive one the ends with some sharp pains and it may feel numb in the middle. Ice packs or heating pad (low settingdon't burn yourself) can be helpful. Treat these incision like normal skin. You can shower and wash this skin as you normally would. You may have steri-strips, like strapping tape bandaids covering your incision. You can remove those 10-14 days after surgery. You need to call the office if there is spreading redness around the incision, it feels hot, or has drainage other than just a mild blood-tinged appearance. Activity You have surprisingly few  restrictions. You can walk, lift less than 15 pounds, go up and down stairs, and generally do what you feel like you can do. Now, you may not FEEL like doing much. Any major surgery is a stress on your body. So you may be tired, have no energy, may feel weak and listless. But no damage will occur if you lift  less than 15 pounds or do moderate activity. Listen to your body. If it is telling you to rest, do so. If you feel good, you won't hurt anything by going shopping of doing household activities. Bleeding You will have bleeding. It may vary from time to time, heavier with activity and lighter at other times. An occasional clot is normal.  Heavy bleeding, like changing a pad every half hour, is not normal.  Call the office if your bleeding is heavy over several hours. Bleeding will generally slow down over time.   By six weeks it should be nearly gone but sometimes some spotting will persist. 
 If you are breastfeeding you may not have a period for months and your bleeding may resolve more quickly. [email or web address] Announcement We are excited to announce that we are making your provider's discharge notes available to you in EximSoft-Trianz. You will see these notes when they are completed and signed by the physician that discharged you from your recent hospital stay. If you have any questions or concerns about any information you see in EximSoft-Trianz, please call the Health Information Department where you were seen or reach out to your Primary Care Provider for more information about your plan of care. Introducing [facility]! Colin Maldonado introduces [employer]rian[employer] patient portal. Now you can access parts of your medical record, email your doctor's office, and request medication refills online. 1. In your internet browser, go to [email or web address] 2. Click on the First Time User? Click Here link in the Sign In box. You will see the New Member Sign Up page. 3. Enter your [employer]Soft-Trianz Access Code exactly as it appears below. You will not need to use this code after youve completed the sign-up process. If you do not sign up before the expiration date, you must request a new code. · [employer]im[employer]rian[employer] Access Code: [email or web address] Expires: 9/30/2018  3:31 PM 
 
4. Enter the last four digits of your Social Security Number (xxxx) and Date of Birth (mm/dd/yyyy) as indicated and click Submit. You will be taken to the next sign-up page. 5. Create a [employer] (Inspect Manager)t ID. This will be your [employer]imSoft-Trianz login ID and cannot be changed, so think of one that is secure and easy to remember. 6. Create a [employer]imSoft-Trianz password. You can change your password at any time. 7. Enter your Password Reset Question and Answer. This can be used at a later time if you forget your password. 8. Enter your e-mail address. You will receive e-mail notification when new information is available in [address] 9. Click Sign Up. You can now view and download portions of your medical record. 10. Click the Download Summary menu link to download a portable copy of your medical information. If you have questions, please visit the Frequently Asked Questions section of the theeventwallt website. Remember, [employer] is NOT to be used for urgent needs. For medical emergencies, dial 911. Now available from your iPhone and Android! Introducing Ozzy Garcia As a [employer] patient, I wanted to make you aware of our electronic visit tool called Ozzy Garcia. [employer] 24/7 allows you to connect within minutes with a medical provider 24 hours a day, seven days a week via a mobile device or tablet or logging into a secure website from your computer. You can access Ozzy Garcia from anywhere in the [place]. A virtual visit might be right for you when you have a simple condition and feel like you just dont want to get out of bed, or cant get away from work for an appointment, when your regular [employer] provider is not available (evenings, weekends or holidays), or when youre out of town and need minor care. Electronic visits cost only $49 and if the [employer] 24/7 provider determines a prescription is needed to treat your condition, one can be electronically transmitted to a nearby pharmacy*. Please take a moment to enroll today if you have not already done so. The enrollment process is free and takes just a few minutes. To enroll, please download the [employer] 24/7 manny to your tablet or phone, or visit [email or web address] to enroll on your computer. And, as an [address] [place] [address] patient with a [employer] account, the results of your visits will be scanned into your electronic medical record and your primary care provider will be able to view the scanned results.    
We urge you to continue to see your regular [employer] provider for your ongoing medical care. And while your primary care provider may not be the one available when you seek a Ozzy Jamisonvinniefin virtual visit, the peace of mind you get from getting a real diagnosis real time can be priceless. For more information on Ozzy Garcia, view our Frequently Asked Questions (FAQs) at [email or web address]. Sincerely, 
 
Valentino Milks, MD 
Chief Medical Officer [employer] *:  certain medications cannot be prescribed via Ozzy Jamisonsmith Providers Seen During Your Hospitalization Provider Specialty Primary office phone Rosemarie Oneil MD Obstetrics & Gynecology [phone] Your Primary Care Physician (PCP) Primary Care Physician Office Phone Office Fax NONE ** None ** ** None ** You are allergic to the following Allergen Reactions Cephalosporins Hives Codeine Hives Pcn (Penicillins) Hives Recent Documentation Height Weight Breastfeeding? BMI OB Status Smoking Status 1.6 m 59.9 kg Unknown 23.38 kg/m2 Recent pregnancy Current Every Day Smoker Emergency Contacts Name Discharge Info Relation Home Work Mobile Sophie Kyle DISCHARGE CAREGIVER [3] Other Relative [6]   [phone] Karla Metcalf [5] [phone] Patient Belongings The following personal items are in your possession at time of discharge: 
  Dental Appliances: None  Visual Aid: Glasses      Home Medications: None   Jewelry: None  Clothing: At bedside    Other Valuables: None Please provide this summary of care documentation to your next provider. Signatures-by signing, you are acknowledging that this After Visit Summary has been reviewed with you and you have received a copy. Patient Signature:  ____________________________________________________________ Date:  ____________________________________________________________  
  
Ac Reilly  Provider Signature: ____________________________________________________________ Date:  ____________________________________________________________

## 2018-09-14 NOTE — LACTATION NOTE
Andreia Colón Lactation Consultant Signed  Progress Notes Date of Service: 09/14/18 9676         Problem: Lactation Care Plan  Goal: *Infant latching appropriately  Outcome: Progressing Towards Goal  Pt will successfully establish breastfeeding by feeding in response to infant's early feeding cues and/or to offer breast every 2-3 hours. Ways to obtain a deep latch and seek comfortable positioning shared, aware to keep log of feedings/output. Goal: *Weight loss less than 10% of birth weight  Outcome: Progressing Towards Goal     Encouraged mom to attempt feeding with baby led feeding cues. Just as sucking on fingers, rooting, mouthing. Looking for 8-12 feedings in 24 hours. Don't limit baby at breast, allow baby to come of breast on it's own. Baby may want to feed  often and may increase number of feedings on second day of life. Skin to skin encouraged.       If baby doesn't nurse,  Mom should  hand express  10-20 drops of colostrum and drip into baby's mouth, or give to baby by finger feeding, cup feeding, or spoon feeding at least every 2-3 hours.      Problem: Patient Education: Go to Patient Education Activity  Goal: Patient/Family Education  Outcome: Progressing Towards Goal  Discussed with mother her plan for feeding. Reviewed the benefits of exclusive breast milk feeding during the hospital stay. Informed her of the risks of using formula to supplement in the first few days of life as well as the benefits of successful breast milk feeding; referred her to the Breastfeeding booklet about this information. She acknowledges understanding of information reviewed and states that it is her plan to breastfeed her infant. Will support her choice and offer additional information as needed.      Hand Expression Education:  Mom taught how to manually hand express her colostrum.   Emphasized the importance of providing infant with valuable colostrum as infant rests skin to skin at breast.  Aware to avoid extended periods of non-feeding. Aware to offer 10-20+ drops of colostrum every 2-3 hours until infant is latching and nursing effectively. Taught the rationale behind this low tech but highly effective evidence based practice.     Comments: Pt will successfully establish breastfeeding by feeding in response to early feeding cues   or wake every 3h, will obtain deep latch, and will keep log of feedings/output. Taught to BF at hunger cues and or q 2-3 hrs and to offer 10-20 drops of hand expressed colostrum at any non-feeds.       Breast Assessment  Left Breast: Medium  Left Nipple: Everted, Intact  Right Breast: Medium  Right Nipple: Everted, Intact  Breast- Feeding Assessment  Attends Breast-Feeding Classes: Yes ([facility])  Breast-Feeding Experience: Yes (breast fed 1 child x 1 month)  Breast Trauma/Surgery: No  Type/Quality: Good (Mother states baby  on and off for 2 hr. after birth)  Lactation Consultant Visits  Breast-Feedings:  (Mother on subutex. Literature given on Dennise - Breastfeeding and Mother's Milk)   for mother to review.

## 2018-09-14 NOTE — H&P
History & Physical    Name: Vivian Mcgowan MRN: [id number]  SSN: [id number]    YOB: 1985  Age: 28 y.o. Sex: female        Subjective:     Estimated Date of Delivery: 18  OB History      Para Term  AB Living    6 3 2 1 2 3    SAB TAB Ectopic Molar Multiple Live Births     2              Ms. Cuellar Bailey is admitted with pregnancy at 38w5d for repeat  section following PROM. Prenatal course was complicated by:  Late prenatal care at 15  Previous C/S X 2 for repeat  on Suboxone 8 mg  Smoker   Please see prenatal records for details. Past Medical History:   Diagnosis Date    Abnormal Pap smear 11    LGSIL + HPV    Abnormal Papanicolaou smear of cervix     ADHD (attention deficit hyperactivity disorder)     Asthma     Cervical high risk HPV (human papillomavirus) test positive 2018    HPV test positive 11    Low grade squamous intraepithelial lesion (LGSIL) on Papanicolaou smear of cervix 2018    Postpartum depression     Psychiatric problem     drug addiction- opiates, initial dependency 5 years ago. .methadone [place]    Trichomonas infection 2014     Past Surgical History:   Procedure Laterality Date     DELIVERY ONLY      HX DILATION AND CURETTAGE      HX OTHER SURGICAL      tonsils    HX OTHER SURGICAL      wisdom teeth extraction     Social History     Occupational History    Not on file.      Social History Main Topics    Smoking status: Current Every Day Smoker     Packs/day: 1.00     Years: 15.00    Smokeless tobacco: Never Used    Alcohol use No    Drug use: Yes     Special: Other      Comment: subutex    Sexual activity: Yes     Partners: Male     Birth control/ protection: None      Comment: methadone     Family History   Problem Relation Age of Onset    Drug Abuse Mother     Drug Abuse Father        Allergies   Allergen Reactions    Cephalosporins Hives    Codeine Hives    Pcn [Penicillins] Hives     Prior to Admission medications    Medication Sig Start Date End Date Taking? Authorizing Provider   buprenorphine HCl (SUBUTEX) 8 mg sublingual tablet by SubLINGual route daily. Take 2 and 1/2 by mouth daily   Yes Historical Provider   PNV No12-Iron-FA-DSS-OM-3 29 mg iron-1 mg -50 mg CPKD Take  by mouth. Yes Historical Provider   albuterol (PROVENTIL HFA, VENTOLIN HFA, PROAIR HFA) 90 mcg/actuation inhaler Take 1-2 Puffs by inhalation every four (4) hours as needed for Wheezing. 9/27/15  Yes Vermell Kanaris Flasschoen, PA   Dextromethorphan-guaiFENesin [facility] DM) 60-1,200 mg TM12 Take 1 Tab by mouth every twelve (12) hours as needed (congestion). 9/27/15   Vermell Kanaris Flasschoen, PA   amphetamine-dextroamphetamine (ADDERALL) 20 mg tablet Take 20 mg by mouth two (2) times a day. Indications: ATTENTION-DEFICIT HYPERACTIVITY DISORDER    Historical Provider        Review of Systems: A comprehensive review of systems was negative except for that written in the HPI. Objective:     Vitals:  Vitals:    18 0929   Weight: 132 lb (59.9 kg)   Height: 5' 3\" (1.6 m)        Physical Exam:  Abdomen: soft, nontender  Fundus: soft and non tender  Perineum: blood absent, amniotic fluid present  Cervical Exam: not checked  Lower Extremities:  - Edema No  Membranes:  Premature Rupture of Membranes; Amniotic Fluid: clear fluid  Fetal Heart Rate: Reactive    Prenatal Labs:   Lab Results   Component Value Date/Time    Rubella, External 2.59 2018    GrBStrep, External Negative 2018    HBsAg, External Negative 2018    HIV, External Negative 2018    RPR, External NR 2011    Gonorrhea, External Negative 2018    Chlamydia, External Negative 2018        Assessment/Plan:     Plan: Admit for repeat  Section. Informed consent was obtained and patient stated she had no further questions. Group B Strep was negative.     Signed By:  Luis Antonio Contreras MD     2018

## 2018-09-14 NOTE — PROGRESS NOTES
09/14/18 11:50 AM  CM met with patient to complete initial assessment and to begin discharge planning. Demographics were reviewed and confirmed. Patient lives with her Olga Rizo ([phone]), and their roommate. Patient has three other children, ages 15, 8, 10 who live with their dad in the [facility]; she sees them regularly. Patient works at [employer] and plans to take at least a month off from work. FOB owns his own helder business and will have a flexible schedule moving forward. Patient undecided on plan to breastfeed or bottle feed. FOB desire baby to be bottle fed due to MOB taking Subutex. CM explained whatever plan they decide on for feeding should be consistent to ensure best outcome for baby. Patient does not have a breast pump for home use and does not want to order one until she and FOB decide on plan to feed. Patient has [facility] services through [facility] and goes to the [employer]. She also has Medicaid services in plan and understands how to add the baby. Dr. Justin Rizo at [facility] will provide medical follow up for the baby. Patient has car seat, crib, clothing, and other necessary supplies. Discussed supports, main supports include FOB, patient's sister, and patient's grandmother. Discussed patient's substance use history. She is currently maintained on 8mg of Subutex. Before pregnancy, she was taking Suboxone and will switch back to Suboxone once she delivers. She sees Dr. John Stone at [address] [place] [address] ([phone]) in the [facility]. She has been seeing him since December 2017. Patient discussed that she started on medication assisted treatment in April 2017 while living in the [facility] and transferred to Dr. Carolina Miller office in December 2017 once returning to the [facility]. Patient stated that she's been doing \"great for the past year and a half\" and feels well maintained on her current dose.   Patient discussed that she was first addicted to pain medication and then \"dabbled\" in heroin. S    Patient understands plan to monitor baby for 5 days to ensure that baby is doing well. Patient's drug screen was completed this morning and is positive for nothing.   Care Management Interventions  PCP Verified by CM: No (provided [facility] list)  Mode of Transport at Discharge: Self  Transition of Care Consult (CM Consult): Discharge Planning  Current Support Network: Family Lives [place], Other, Lives with Spouse (Live with fiance/FOB)  Confirm Follow Up Transport: Family  Plan discussed with Pt/Family/Caregiver: Yes  Discharge Location  Discharge Placement: Home with family assistance  DARIANA Díaz

## 2018-09-14 NOTE — L&D DELIVERY NOTE
Delivery Summary    Patient: Moshe Patterson MRN: [id number]  SSN: [id number]    YOB: 1985  Age: 28 y.o. Sex: female        Labor Events:    Labor: No    Rupture Date: 2018    Rupture Time: 1:13 PM    Rupture Type: AROM    Amniotic Fluid Volume: Moderate     Amniotic Fluid Description:  Amniotic fluid Odor: Clear    None     Induction: None         Induction Date:        Induction Time:       Indications for Induction:       Augmentation: None    Augmentation Date:      Augmentation Time:      Indications for Augmentation:      Events:        Cervical Ripening:       None    Rupture Identifier: Rupture 1     Labor complications: None     Additional complications:         Delivery Events:  Estimated Blood Loss (ml):   600     Information for the patient's :  Clededrick Pinzoniday Female [[id number]]     Delivery Summary - Baby    Delivery Date: 2018  Delivery Time: 1:32 PM  Delivery Type: , Low Transverse    Section Delivery:     Sex:  female     Gestational Age: 44w7d  Delivery Clinician:  Thelma Horn   Living?: Living  Delivery Location: OR           APGARS  One minute Five minutes Ten minutes   Skin color: 1   1        Heart rate: 2   2        Grimace: 2   2        Muscle tone: 2   2        Breathin   2        Totals: 9   9           Presentation: Vertex    Position:   Occiput Anterior  Resuscitation Method:  Suctioning-bulb; Tactile Stimulation     Meconium Stained: None      Cord Information: 3 Vessels   Complications: Nuchal Cord With Compressions  Cord Blood Sent?:  No    Blood Gases Sent?:  No    Placenta:  Date/Time:   1:14 PM  Removal: Manual Removal      Appearance: Normal     [place] Measurements:  Birth Weight:        Birth Length:        Head Circumference:        Chest Circumference:       Abdominal Girth:       Other Providers:   Denver Goad O;RIMA BOYD;PENNY LARKIN;RAZIA ANDINO;JENNIFER AIKEN;SARAH DEL CASTILLO;Maikel YOON Parada Obstetrician;Primary Nurse;Primary  Nurse;Crna; Charge Nurse;Scrub Tech;Scrub Tech           Group Beta Strep:   Lab Results   Component Value Date/Time    GrMATEUSZtrep, External Negative 2018        Cord Blood Results:  Information for the patient's :  Gilmar Figueroa, Female [[id number]]   No results found for: ABORH, PCTABR, PCTDIG, BILI, ABORHEXT, ABORH    Information for the patient's :  Gilmar Figueroa, Female [[id number]]   No results found for: APH, APCO2, APO2, AHCO3, ABEC, ABDC, O2ST, SITE, [place], PHI, [place], PO2I, HCO3I, SO2I, IBD    Information for the patient's :  Gilmar Figueroa, Female [[id number]]   No results found for: EPHV, PCO2V, PO2V, HCO3V, O2STV, EBDV

## 2018-09-14 NOTE — IP AVS SNAPSHOT
[address] 
[phone] Patient: Elgin Whitfield MRN: [id number] :1985 A check del indicates which time of day the medication should be taken. My Medications START taking these medications Instructions Each Dose to Equal  
 Morning Noon Evening Bedtime  
 acetaminophen 325 mg tablet Commonly known as:  TYLENOL Your last dose was: Your next dose is: Take 2 Tabs by mouth every four (4) hours as needed. 650 mg  
    
   
   
   
  
 docusate sodium 100 mg capsule Commonly known as:  Lacie Dilling Your last dose was: Your next dose is: Take 1 Cap by mouth daily for 90 days. 100 mg  
    
   
   
   
  
 ibuprofen 800 mg tablet Commonly known as:  MOTRIN Your last dose was: Your next dose is: Take 1 Tab by mouth every eight (8) hours as needed for Pain. 800 mg CONTINUE taking these medications Instructions Each Dose to Equal  
 Morning Noon Evening Bedtime ADDERALL 20 mg tablet Generic drug:  dextroamphetamine-amphetamine Your last dose was: Your next dose is: Take 20 mg by mouth two (2) times a day. Indications: ATTENTION-DEFICIT HYPERACTIVITY DISORDER  
 20 mg  
    
   
   
   
  
 albuterol 90 mcg/actuation inhaler Commonly known as:  PROVENTIL HFA, VENTOLIN HFA, PROAIR HFA Your last dose was: Your next dose is: Take 1-2 Puffs by inhalation every four (4) hours as needed for Wheezing. 1-2 Puff  
    
   
   
   
  
 buprenorphine HCl 8 mg sublingual tablet Commonly known as:  SUBUTEX Your last dose was: Your next dose is:    
   
   
 by SubLINGual route daily. Take 2 and 1/2 by mouth daily Dextromethorphan-guaiFENesin 60-1,200 mg Tm12 Commonly known as:  Miky & Miky DM  
   
 Your last dose was: Your next dose is: Take 1 Tab by mouth every twelve (12) hours as needed (congestion). 1 Tab PNV No12-Iron-FA-DSS-OM-3 29 mg iron-1 mg -50 mg Cpkd Your last dose was: Your next dose is: Take  by mouth. Where to Get Your Medications Information on where to get these meds will be given to you by the nurse or doctor. ! Ask your nurse or doctor about these medications  
  acetaminophen 325 mg tablet  
 docusate sodium 100 mg capsule  
 ibuprofen 800 mg tablet

## 2018-09-15 LAB
BASOPHILS # BLD: 0 K/UL (ref 0–0.1)
BASOPHILS NFR BLD: 0 % (ref 0–1)
DIFFERENTIAL METHOD BLD: ABNORMAL
EOSINOPHIL # BLD: 0.1 K/UL (ref 0–0.4)
EOSINOPHIL NFR BLD: 1 % (ref 0–7)
ERYTHROCYTE [DISTWIDTH] IN BLOOD BY AUTOMATED COUNT: 14 % (ref 11.5–14.5)
HCT VFR BLD AUTO: 25.9 % (ref 35–47)
HGB BLD-MCNC: 8.8 G/DL (ref 11.5–16)
IMM GRANULOCYTES # BLD: 0.1 K/UL (ref 0–0.04)
IMM GRANULOCYTES NFR BLD AUTO: 1 % (ref 0–0.5)
LYMPHOCYTES # BLD: 2.1 K/UL (ref 0.8–3.5)
LYMPHOCYTES NFR BLD: 18 % (ref 12–49)
MCH RBC QN AUTO: 32.8 PG (ref 26–34)
MCHC RBC AUTO-ENTMCNC: 34 G/DL (ref 30–36.5)
MCV RBC AUTO: 96.6 FL (ref 80–99)
MONOCYTES # BLD: 0.8 K/UL (ref 0–1)
MONOCYTES NFR BLD: 7 % (ref 5–13)
NEUTS SEG # BLD: 8.2 K/UL (ref 1.8–8)
NEUTS SEG NFR BLD: 72 % (ref 32–75)
NRBC # BLD: 0 K/UL (ref 0–0.01)
NRBC BLD-RTO: 0 PER 100 WBC
PLATELET # BLD AUTO: 212 K/UL (ref 150–400)
PMV BLD AUTO: 10.1 FL (ref 8.9–12.9)
RBC # BLD AUTO: 2.68 M/UL (ref 3.8–5.2)
WBC # BLD AUTO: 11.3 K/UL (ref 3.6–11)

## 2018-09-15 PROCEDURE — 74011250637 HC RX REV CODE- 250/637: Performed by: OBSTETRICS & GYNECOLOGY

## 2018-09-15 PROCEDURE — 85025 COMPLETE CBC W/AUTO DIFF WBC: CPT | Performed by: OBSTETRICS & GYNECOLOGY

## 2018-09-15 PROCEDURE — 74011250636 HC RX REV CODE- 250/636: Performed by: OBSTETRICS & GYNECOLOGY

## 2018-09-15 PROCEDURE — 74011250636 HC RX REV CODE- 250/636: Performed by: ANESTHESIOLOGY

## 2018-09-15 PROCEDURE — 65270000029 HC RM PRIVATE

## 2018-09-15 PROCEDURE — 36415 COLL VENOUS BLD VENIPUNCTURE: CPT | Performed by: OBSTETRICS & GYNECOLOGY

## 2018-09-15 RX ADMIN — Medication 10 ML: at 02:12

## 2018-09-15 RX ADMIN — ZOLPIDEM TARTRATE 5 MG: 5 TABLET ORAL at 21:08

## 2018-09-15 RX ADMIN — BUPRENORPHINE HYDROCHLORIDE SUBLINGUAL 8 MG: 2 TABLET SUBLINGUAL at 16:14

## 2018-09-15 RX ADMIN — BUPRENORPHINE HYDROCHLORIDE SUBLINGUAL 8 MG: 2 TABLET SUBLINGUAL at 11:52

## 2018-09-15 RX ADMIN — DIPHENHYDRAMINE HYDROCHLORIDE 25 MG: 25 CAPSULE ORAL at 21:08

## 2018-09-15 RX ADMIN — KETOROLAC TROMETHAMINE 30 MG: 30 INJECTION, SOLUTION INTRAMUSCULAR at 02:11

## 2018-09-15 RX ADMIN — IBUPROFEN 800 MG: 800 TABLET, FILM COATED ORAL at 21:08

## 2018-09-15 RX ADMIN — Medication 1 TABLET: at 08:04

## 2018-09-15 RX ADMIN — BUPRENORPHINE HYDROCHLORIDE SUBLINGUAL 8 MG: 2 TABLET SUBLINGUAL at 08:02

## 2018-09-15 RX ADMIN — IBUPROFEN 800 MG: 800 TABLET, FILM COATED ORAL at 13:45

## 2018-09-15 RX ADMIN — DIPHENHYDRAMINE HYDROCHLORIDE 12.5 MG: 50 INJECTION, SOLUTION INTRAMUSCULAR; INTRAVENOUS at 02:11

## 2018-09-15 RX ADMIN — IBUPROFEN 800 MG: 800 TABLET, FILM COATED ORAL at 08:03

## 2018-09-15 NOTE — PROGRESS NOTES
Bedside and Verbal shift change report given to ERMA Plummer RN (oncoming nurse) by Marlo Champion RN (offgoing nurse). Report included the following information SBAR, Kardex, Procedure Summary and Intake/Output.

## 2018-09-15 NOTE — LACTATION NOTE
Lizzie Estrella Lactation Consultant Signed  Progress Notes Date of Service: 09/15/18 9741         Problem: Lactation Care Plan  Goal: *Infant latching appropriately  Outcome: Progressing Towards Goal  Pt will successfully establish breastfeeding by feeding in response to infant's early feeding cues and/or to offer breast every 2-3 hours. Ways to obtain a deep latch and seek comfortable positioning shared, aware to keep log of feedings/output. Goal: *Weight loss less than 10% of birth weight  Outcome: Progressing Towards Goal  Current infant weight loss is -2.6%  Reviewed breastfeeding basics:  Supply and demand, breastfeed baby 8-12 times in 24 hr.,  stomach size, early  Feeding cues, skin to skin, positioning and baby led latch-on, assymetrical latch with signs of good, deep latch vs shallow, feeding frequency and duration, and log sheet for tracking infant feedings and output. Breastfeeding Booklet and Warm line information given. Discussed typical  weight loss and the importance of infant weight checks with pediatrician 1-2 post discharge.     Problem: Patient Education: Go to Patient Education Activity  Goal: Patient/Family Education  Outcome: Progressing Towards Goal   discussed the following:     Engorgement Care Guidelines:  Reviewed how milk is made and normal phases of milk production. Taught care of engorged breasts - frequent breastfeeding encouraged, cool packs and motrin as tolerated.   Anticipatory guidance shared.      Discussed what to do if nipples become sore - Care for sore/tender nipples discussed:  ways to improve positioning and latch practiced and discussed, hand express colostrum after feedings and let air dry, light application of lanolin, hydrogel pads, seek comfortable laid back feeding position, start feedings on least sore side first.           Comments: Pt will successfully establish breastfeeding by feeding in response to early feeding cues   or wake every 3h, will obtain deep latch, and will keep log of feedings/output. Taught to BF at hunger cues and or q 2-3 hrs and to offer 10-20 drops of hand expressed colostrum at any non-feeds.       Breast Assessment  Left Breast: Medium  Left Nipple: Everted, Intact  Right Breast: Medium  Right Nipple: Everted, Intact  Breast- Feeding Assessment  Attends Breast-Feeding Classes: Yes  Breast-Feeding Experience: Yes (Breast fed 1st baby x 1 month)  Breast Trauma/Surgery: No  Type/Quality: Good (Mother states baby has been breastfeeding well  Mother states baby nursed well last night and this morning. )  Lactation Consultant Visits  Breast-Feedings: Attempted breast-feeding (Baby last breast fed at12:00 on and off for 2 hr. Mother tried to nurse with [facility] but baby sleepy. Baby being scored in nursery for HATTIE. Mother given literatue on Subutex (L2) from Sushila Granger to review and also given # to [facility].)  Mother/Infant Observation  Mother Observation: Alignment, Breast comfortable, Close hold (Breastfeedong attempted.  Baby sleepy)  Infant Observation: Frenulum checked, Opens mouth

## 2018-09-15 NOTE — PROGRESS NOTES
Post-Operative Day Number 1 Progress Note     Patient doing well post-op day 1 from  delivery without significant complaints. Pain controlled on current medication. Brooks just removed, pt has not voided yet, normal lochia. Passing gas. Tolerating clear liquid diet without nausea or vomiting. Breast feeding without difficulty. Vitals:  Patient Vitals for the past 8 hrs:   BP Temp Pulse Resp   09/15/18 0740 109/66 98.3 °F (36.8 °C) 75 16   09/15/18 0519 107/58 98.3 °F (36.8 °C) 62 16     Temp (24hrs), Av.4 °F (36.9 °C), Min:98.3 °F (36.8 °C), Max:98.4 °F (36.9 °C)                   Intake/Output Summary (Last 24 hours) at 09/15/18 1037  Last data filed at 09/15/18 0600   Gross per 24 hour   Intake             1000 ml   Output             2250 ml   Net            -1250 ml         Exam:  Patient without distress               Lungs:  CTA bilaterally               CV:  Regular rate and rhythm      Breasts: Soft, nipples intact               Abdomen soft, nondistended, normal bowel sounds               Uterus:  fundus firm at level of umbilicus, nontender. Incision:  Intact, with no erythema, exudate, induration, staples intact. Dressing replaced this morning. Lower extremities are negative for cords or tenderness; negative swelling.     Labs:   Recent Results (from the past 24 hour(s))   CBC WITH AUTOMATED DIFF    Collection Time: 09/15/18  5:20 AM   Result Value Ref Range    WBC 11.3 (H) 3.6 - 11.0 K/uL    RBC 2.68 (L) 3.80 - 5.20 M/uL    HGB 8.8 (L) 11.5 - 16.0 g/dL    HCT 25.9 (L) 35.0 - 47.0 %    MCV 96.6 80.0 - 99.0 FL    MCH 32.8 26.0 - 34.0 PG    MCHC 34.0 30.0 - 36.5 g/dL    RDW 14.0 11.5 - 14.5 %    PLATELET 057 406 - 470 K/uL    MPV 10.1 8.9 - 12.9 FL    NRBC 0.0 0  WBC    ABSOLUTE NRBC 0.00 0.00 - 0.01 K/uL    NEUTROPHILS 72 32 - 75 %    LYMPHOCYTES 18 12 - 49 %    MONOCYTES 7 5 - 13 %    EOSINOPHILS 1 0 - 7 %    BASOPHILS 0 0 - 1 %    IMMATURE GRANULOCYTES 1 (H) 0.0 - 0.5 %    ABS. NEUTROPHILS 8.2 (H) 1.8 - 8.0 K/UL    ABS. LYMPHOCYTES 2.1 0.8 - 3.5 K/UL    ABS. MONOCYTES 0.8 0.0 - 1.0 K/UL    ABS. EOSINOPHILS 0.1 0.0 - 0.4 K/UL    ABS. BASOPHILS 0.0 0.0 - 0.1 K/UL    ABS. IMM. GRANS. 0.1 (H) 0.00 - 0.04 K/UL    DF AUTOMATED         Assessment and Plan:  Postoperative day #1S/P C/S. Doing well. - Continue routine post-op care and maternal education.      - encourage ambulation   - advance diet as tolerated

## 2018-09-15 NOTE — PROGRESS NOTES
1910: Bedside and Verbal shift change report given to Terrence0 FRANKLIN Obrien (oncoming nurse) by Jermian Woodard RN (offgoing nurse). Report included the following information SBAR, Kardex, Intake/Output, MAR, Accordion, Recent Results and Med Rec Status. Assumed care of pt at this time. Pt not in room at this time - in nursery. 1944: Pt back to room. 2005: RN at bedside for assessment. 2109: RN at bedside for medication administration. Pt states that she usually take Subutex around 1600 in order to help her sleep at night. Pt states she takes first dose at 0600, next dose at 0000, last dose at 1600.     2330: Purposeful rounding. Pt observed sleeping at this time. 0015: RN at bedside for purposeful hourly rounding performed. Pt observed watching television. Pt denies need of anything at this time. 0045: RN in nursery to round on pt. Pt declines to remove dressing at this time. Pt would like tylenol at 0100.     0151: RN at bedside for purposeful hourly rounding and pain reassessment. Pt observed sleeping at this time. Respirations even and unlabored. 65: RN at bedside for reassessment. [id number] Grimm MD, pt may receive subutex morning dose at 0600.     0733: Bedside and Verbal shift change report given to Maren Love RN (oncoming nurse) by Maxi Sosa RN (offgoing nurse). Report included the following information SBAR, Kardex, Intake/Output, MAR, Accordion, Recent Results and Med Rec Status.

## 2018-09-15 NOTE — PROGRESS NOTES
Bedside and Verbal shift change report given to BRIAN Desir RN (oncoming nurse) by Diedra Harada, RN (offgoing nurse). Report included the following information SBAR, Kardex, Procedure Summary and Intake/Output.

## 2018-09-15 NOTE — PROGRESS NOTES
0546 Received bedside report from Maxi Verduzco RN.    9426  NP at the bedside discussing plan of care for baby with patient and significant other. 2992 Patient eating breakfast and breast feeding baby. 1020 Brooks catheter removed. 300 mL of nydia yellow urine drained from the bag. 10 mL of normal saline removed from balloon. Patient tolerated well. Patient ambulated to the bathroom with RN assist. Valentine care, pad change and gown change done at this time. Patient ambulated back to the bed where she was placed in the supine position and dressing change done by RN using normal saline to clean area.  wound was approximated with some bruising. [place] dressing changed and secured with [place] tape. [id number] Watson Liborio[address]102 at the bedside assessing patient and answering any questions. Patient requested off the unit privileges and Thai Cassrica agreed to allow that to happen at this time. 1158 Patient ambulated to the bathroom with stand by assist by the RN. Valentine care and pad change done by the patient. Patient able to void 500 mL of nydia color urine. Patient was instructed to call RN when voiding and collect urine for measurement. Patient denies any N/V, visual disturbance or gait disturbance. Will continue to monitor. 1345 Patient with lactation. 1455 Patient in bathroom brushing teeth. [id number] Patient ambulated off unit with significant other. Baby in the nursery. 1620 Patient in nursery with baby. 191 Bedside and Verbal shift change report given to Nan Osgood, RN (oncoming nurse) by Ezequiel Gill RN (offgoing nurse). Report included the following information SBAR, Kardex and MAR.

## 2018-09-16 PROCEDURE — 77030008027

## 2018-09-16 PROCEDURE — 74011250637 HC RX REV CODE- 250/637: Performed by: OBSTETRICS & GYNECOLOGY

## 2018-09-16 PROCEDURE — 65270000029 HC RM PRIVATE

## 2018-09-16 PROCEDURE — 74011250636 HC RX REV CODE- 250/636: Performed by: OBSTETRICS & GYNECOLOGY

## 2018-09-16 RX ADMIN — IBUPROFEN 800 MG: 800 TABLET, FILM COATED ORAL at 22:08

## 2018-09-16 RX ADMIN — ACETAMINOPHEN 650 MG: 325 TABLET ORAL at 08:22

## 2018-09-16 RX ADMIN — DIPHENHYDRAMINE HYDROCHLORIDE 25 MG: 25 CAPSULE ORAL at 22:10

## 2018-09-16 RX ADMIN — IBUPROFEN 800 MG: 800 TABLET, FILM COATED ORAL at 04:02

## 2018-09-16 RX ADMIN — ZOLPIDEM TARTRATE 5 MG: 5 TABLET ORAL at 22:08

## 2018-09-16 RX ADMIN — BUPRENORPHINE HYDROCHLORIDE SUBLINGUAL 8 MG: 2 TABLET SUBLINGUAL at 06:00

## 2018-09-16 RX ADMIN — BUPRENORPHINE HYDROCHLORIDE SUBLINGUAL 8 MG: 2 TABLET SUBLINGUAL at 12:06

## 2018-09-16 RX ADMIN — ACETAMINOPHEN 650 MG: 325 TABLET ORAL at 18:28

## 2018-09-16 RX ADMIN — ACETAMINOPHEN 650 MG: 325 TABLET ORAL at 01:09

## 2018-09-16 RX ADMIN — ACETAMINOPHEN 650 MG: 325 TABLET ORAL at 14:03

## 2018-09-16 RX ADMIN — BUPRENORPHINE HYDROCHLORIDE SUBLINGUAL 8 MG: 2 TABLET SUBLINGUAL at 16:12

## 2018-09-16 RX ADMIN — IBUPROFEN 800 MG: 800 TABLET, FILM COATED ORAL at 12:05

## 2018-09-16 RX ADMIN — Medication 1 TABLET: at 08:22

## 2018-09-16 NOTE — PROGRESS NOTES
Post-Operative Day Number 2 Progress Note    Patient doing well post-op day 2 from  delivery without significant complaints. Pain controlled on current medication of tylenol and ibuprophen. Voiding without difficulty, normal lochia. Tolerating regular diet without nausea or vomiting.  +flatus. Bottle feeding without difficulty. Vitals:  Patient Vitals for the past 8 hrs:   BP Temp Pulse Resp   18 0814 106/53 98.1 °F (36.7 °C) 71 16   18 0404 99/51 97.8 °F (36.6 °C) 60 15     Temp (24hrs), Av.3 °F (36.8 °C), Min:97.8 °F (36.6 °C), Max:98.9 °F (37.2 °C)        Exam:  Patient without distress               Lungs:  CTA bilaterally               CV:  Regular rate and rhythm    Breasts:soft, nipples intact. Abdomen soft, nondistended, normal bowel sounds               Uterus: fundus firm at level of umbilicus, nontender. Incision: no erythema, exudate or induration                Lower extremities are negative for cords or tenderness; No swelling. Labs: No results found for this or any previous visit (from the past 24 hour(s)). Assessment and Plan:  Postoperative day #2,  uncomplicated post- course.       - routine postop care  - anticipate discharge in AM

## 2018-09-16 NOTE — PROGRESS NOTES
1900 Bedside SBAR report received from 4001 Hum[address] Gonzalez and transfer of care accepted at this time. 1916 Shift assessment completed. Pt states she was told her baby may be transferred to [facility] in the morning so that she may stay with the baby until the baby is discharged home. Discussed with pt that we would still be discharging pt home in the am but if the baby gets transferred the pt is welcome to follow infant. Pt verbalizes she understands she will be discharged but is hoping she may stay overnight with her baby at [facility]. Discussed with pt that the pediatrician rounding on the bay would discuss POC in the am. Pt agrees to [address] at this time. 2023 Pt states she would like her motrin for pain and ambien for sleep at 2200. Pt states she will call when ready. Pt ambulating outside to smoke at this time. 2130 Pt in the Nursery visiting infant with FOB at this time. 2211 Pt back to bed from visiting infant in nursery. Pt given ambien 5mg for sleep, 800mg motrin PO, 25mg benedryl for sleep, and vaseline for r lowre abdominal irritation from incisional dressing. 2300 Hourly rounding performed. Pt resting quietly on R side talking with boyfriend at this time. 0000 Hourly rounding completed at this time. Pt resting quietly on R side with eyes closed bilateral expansion of pt chest noted at this time. 0100 Hourly rounding completed at this time. Pt resting quietly on R side with eyes closed bilateral expansion of pt chest noted at this time. 0200 Hourly rounding completed at this time. Pt resting quietly in semifowlers with eyes closed bilateral expansion of pt chest noted at this time. 0415 Pt resting quietly in prone position eyes closed bilateral expansion of pt chest noted at this time. Hourly rounding completed. 0600 Hourly rounding performed at this time and pt in prone position bilateral expansion of chest noted. 1727 Pt ambulated to Nursery to feed infant.  While in the nursery pt became pale and shaky. Transferred pt from the Nursery back to pt room by wheelchair to rest. Pt given 8mg subutex PO and 800mg motrin PO. Pt states she will call to go back to the nursery when she is feeling better.

## 2018-09-16 NOTE — PROGRESS NOTES
0700 Assumed care of patient following bedside SBAR report from KATHY Coto RN.  1541 AM assessment complete. Patient sitting up in bed eating breakfast.  4979 Patient complained of pain rated 6/10, see mar. PO Tylenol given. PP depression scale form given to patient to fill out, patient stated understanding. 9391 Patient ambulated off unit. 4173 Patient ambulated back onto unit. 0910 Rounded on patient, patient appears to be sleeping, respirations even and unlabored. 1010 Rounded, patient resting in bed. No needs expressed. 1115 Patient ambulated off unit with significant other. 1123 Patient ambulated back onto unit. 1225 Old abdominal dressing removed, large amount of bruising and swelling noted all around incision, daisy intact. Dr. Deniz Kay notified, will come assess. [address] Dr. Deniz Kay at bedside, Paige Chiu do not remove daisy at this time and place abd dressing and pad with no tape over incision. 1235 Patient ambulated to nursery. 1705 Patient resting in bed. Significant other at bedside. No needs expressed. Bedside shift change report given to LEXIE Mariano RN (oncoming nurse) by Shelby Kennedy. Lan Abreu RN (offgoing nurse). Report included the following information SBAR, OR Summary, Procedure Summary, Intake/Output, MAR, Recent Results and Med Rec Status.

## 2018-09-17 ENCOUNTER — TELEPHONE (OUTPATIENT)
Dept: OBGYN CLINIC | Age: 33
End: 2018-09-17

## 2018-09-17 VITALS
RESPIRATION RATE: 16 BRPM | TEMPERATURE: 98.7 F | HEIGHT: 63 IN | SYSTOLIC BLOOD PRESSURE: 113 MMHG | BODY MASS INDEX: 23.39 KG/M2 | DIASTOLIC BLOOD PRESSURE: 57 MMHG | OXYGEN SATURATION: 99 % | WEIGHT: 132 LBS | HEART RATE: 79 BPM

## 2018-09-17 PROCEDURE — 74011250637 HC RX REV CODE- 250/637: Performed by: OBSTETRICS & GYNECOLOGY

## 2018-09-17 PROCEDURE — 74011250636 HC RX REV CODE- 250/636: Performed by: OBSTETRICS & GYNECOLOGY

## 2018-09-17 RX ORDER — DOCUSATE SODIUM 100 MG/1
100 CAPSULE, LIQUID FILLED ORAL DAILY
Status: DISCONTINUED | OUTPATIENT
Start: 2018-09-17 | End: 2018-09-17 | Stop reason: HOSPADM

## 2018-09-17 RX ORDER — ACETAMINOPHEN 325 MG/1
650 TABLET ORAL
Qty: 60 TAB | Refills: 2 | Status: SHIPPED | OUTPATIENT
Start: 2018-09-17 | End: 2022-03-23

## 2018-09-17 RX ORDER — DOCUSATE SODIUM 100 MG/1
100 CAPSULE, LIQUID FILLED ORAL DAILY
Qty: 60 CAP | Refills: 2 | Status: SHIPPED | OUTPATIENT
Start: 2018-09-17 | End: 2018-12-16

## 2018-09-17 RX ORDER — IBUPROFEN 800 MG/1
800 TABLET ORAL
Qty: 90 TAB | Refills: 2 | Status: SHIPPED | OUTPATIENT
Start: 2018-09-17 | End: 2022-03-23

## 2018-09-17 RX ADMIN — DOCUSATE SODIUM 100 MG: 100 CAPSULE, LIQUID FILLED ORAL at 08:16

## 2018-09-17 RX ADMIN — BUPRENORPHINE HYDROCHLORIDE SUBLINGUAL 8 MG: 2 TABLET SUBLINGUAL at 12:24

## 2018-09-17 RX ADMIN — BUPRENORPHINE HYDROCHLORIDE SUBLINGUAL 8 MG: 2 TABLET SUBLINGUAL at 06:16

## 2018-09-17 RX ADMIN — IBUPROFEN 800 MG: 800 TABLET, FILM COATED ORAL at 06:16

## 2018-09-17 NOTE — WOUND CARE
Wound care consult per OB/GYN to assess the lower abdominal incision S/ P C section 9/14. Alert, no distress. Assessed with Staff nurse. Denies pain. Assessment  Lower abdominal surgical incision- approximated with intact staples, mara wound is edematous with large areas of purple and red ecchymosis, small amount of serosanguinous to sanguinous drainage noted between intact staples mid incision. Treatment  Incision cleansed with saline, skin prep applied; NITIN dressing applied and connected to suction. Instructions given and reviewed with patient verbalizing good understanding. Supplies given to patient - patient to return to OB/GYN later this week. Orders per GYN- Dr Shellie Veliz.    Merline Teixeira

## 2018-09-17 NOTE — PROGRESS NOTES
26 - SBAR report from Nelsy Liner RN at nurses station. Bedside report deferred as patient was outside smoking. Assumed care. 0745 - Rounding for AM assessment. Pt requests something for BM. Inquiring about rooming in at [facility] and expresses desire to do so. Will follow with NICU MD.   3601 - Dr. Blair Castañeda at bedside. Incision check - area significant for small amount oozing between staples and ecchymotic. Will defer staple removal.  Pt to make appt for Thursday or Friday this week for incision evaluation in office. Pt may be discharged home. New order for docusate sodium. 65 - Dr. Blair Castañeda at bedside. New order for wound team consult. 9400 Ian Capone from wound care called to assess patient. Will come to bedside later this AM.    0930 - Pt in nursery, feeding infant. 9670 - Wound care RN at bedside evaluating incision. NITIN dressing applied over staples by wound care RN. Wound care RN performed NITIN dressing teaching. 1000 - Pt called out says she feels cold and shaky. Temp 98.9 axillary. BP now 106/57. Bleeding scant, dark red and fundus firm. No new bleeding noted on dressing. 1005 - Pt says she feels better, has just eaten oatmeal.  Will continue to monitor. Pt also states she has felt these \"chills\" with each prior c/section for a few days afterwards. 1015 - Transport to [facility] being arranged for infant, so mother can board with baby. 1109 - Pt in nrusery, holding baby. Temp 98.4 orally. 1200 - Pt in nursery while infant is preparing for transfer to [facility]. 1200 subutex deferred per pt until transfer is complete. Pt will call for dose. 1230 - Discharge instructions given to patient and FOB. Dressing now c/d/i. Pt understands to follow up on Thursday at 1:50pm with Dr. Blair Castañeda for incision check. Instructions to include s/s of infection, bleeding. Pt verbalizes understanding and time allowed for questions.   1240 - Pt discharged home accompanied by FOB via wheelchair instable condition and with all belongings. Plan to room in at [address] while baby under care in nursery for HATTIE.

## 2018-09-17 NOTE — DISCHARGE SUMMARY
Obstetrical Discharge Summary     Name: Keren Del Valle MRN: [id number]  SSN: [id number]    YOB: 1985  Age: 28 y.o. Sex: female      Admit Date: 2018    Discharge Date: 2018     Admitting Physician: Brianne Meyer MD     Attending Physician:  Brianne Meyer MD     Admission Diagnoses: History of  [D41.095]    Discharge Diagnoses:   Information for the patient's :  Diana Carrillo, Female [[id number]]   Delivery of a 6 lb 8.9 oz (2.975 kg) female infant via , Low Transverse on 2018 at 1:32 PM  by . Apgars were 9 and 9. Additional Diagnoses:   Hospital Problems  Date Reviewed: 2018          Codes Class Noted POA    Delivery by  section at 37-39 weeks of gestation due to labor ICD-10-CM: O75.82  ICD-9-CM: 649.81  2018 Unknown             Lab Results   Component Value Date/Time    Rubella, External 2.59 2018    GrBStrep, External Negative 2018       Immunization(s):   Immunization History   Administered Date(s) Administered    Tdap 2018        Hospital Course: Postpartum course was complicated by wound hematoma, which added 0 days to the patient's length of stay. The patient was released to her home in good condition. Patient Instructions:     Reference my discharge instructions. Follow-up Appointments   Procedures    FOLLOW UP VISIT Appointment in: 5 days     Standing Status:   Standing     Number of Occurrences:   1     Order Specific Question:   Appointment in     Answer:    Two Weeks        Signed By:  Brianne Meyer MD     2018

## 2018-09-17 NOTE — LACTATION NOTE
LC in to visit with mother. Baby in nursery. Mother states she spoke with FOB and decided to stop breastfeeding baby since mother is on subutex. Mother states she and FOB spoke to the  nurse practicioner about breastfeeding and they decided not to breastfeed. Mother states baby last breast fed the night of 9/15/18. They did not call the [address]. LC instructed mother to call if she has any further lactation questions.

## 2018-09-17 NOTE — PROGRESS NOTES
Post-Operative Day 3 Progress/Discharge Note    Patient now post-op day 3 following  delivery. No significant complaints. Pain controlled on medication. Voiding without difficulty, normal lochia. Vitals:  No data found. Temp (24hrs), Av °F (36.7 °C), Min:98 °F (36.7 °C), Max:98.1 °F (36.7 °C)      Vital signs stable, afebrile. Exam:  Patient without distress. Abdomen soft, flat, non-tender with fundus firm. Incision  clean, dry, and intact with significant wound hematoma. Lower extremities are negative for swelling, cords or tenderness. Labs: No results found for this or any previous visit (from the past 24 hour(s)). Assessment and Plan:  Patient has to date had post- course with minor complication of wound hematoma. Will consult wound team to consider wound vac. Continue routine post-op care and maternal education. Plan discharge for today--see discharge summary.

## 2018-09-17 NOTE — PROGRESS NOTES
09/17/18 11:00 AM  Plan for baby's transfer today to [facility] [place] so that MOB can board and remain with baby as much as possible. Baby will need Early Intervention ([address]) referral at discharge as well as [facility] follow up. Hearing screen done today and baby passed. MOB to see Dr. Judy Sweeney for follow up (on staples and NITIN) this week; CM scheduled for Thursday 9/20/18 at 1:50 PM; appt added to AVS and MOB informed. MOB stated that she will remain out of work for 6 weeks. 09/17/18 9:49 AM  CM met with parents this morning during their visit with the baby. MOB noted that she's doing well and feeling much better than expected. MOB breastfeed at delivery, but has now decided to bottle feed formula since baby is admitted to NICU and started on medications for withdrawal.  She stated that FOB has been supportive of her decision to bottle feed formula. Baby was admitted to NICU on Saturday 9/15 and current Morphine dose is 0.14mg q3. Parents denied any additional needs at this time. Baby's UDS completed and positive for nothing, meconium pending.   DARIANA Calixto

## 2018-09-17 NOTE — TELEPHONE ENCOUNTER
Call received at 10:56am      28year old patient last seen in the office, delivered her baby by  on 19. Cyn Moran , case management calling to ask for follow up appointment for the patient to staples check and huma dressing.     Patient placed on the schedule for 18 at 1:30PM

## 2018-09-17 NOTE — LACTATION NOTE
This note was copied from a baby's chart. Lactation note: Pt listed as breastfeeding but states has changed mind, would rather  formula feed. Aware that in this early post partum period she can return to BF if she so chooses. Normal  feeding behaviors reviewed. Alternative feeding options such as exclusive pumping discussed as well as ways to gently return infant to feeding at the breast.  How to offer a bottle in a way that supports infant's BF skills practiced. Questions answered. Gentle encouragement, education and support provided. Reviewed breast care for milk suppression.

## 2018-09-20 ENCOUNTER — OFFICE VISIT (OUTPATIENT)
Dept: OBGYN CLINIC | Age: 33
End: 2018-09-20

## 2018-09-20 VITALS
RESPIRATION RATE: 16 BRPM | BODY MASS INDEX: 22.54 KG/M2 | HEART RATE: 64 BPM | OXYGEN SATURATION: 98 % | DIASTOLIC BLOOD PRESSURE: 68 MMHG | HEIGHT: 63 IN | WEIGHT: 127.2 LBS | SYSTOLIC BLOOD PRESSURE: 104 MMHG

## 2018-09-20 NOTE — PROGRESS NOTES
Postop  Problem Evaluation Zuleika Strauss is a 28 y.o. female returns for a post-operative follow-up visit for a wound hematoma after undergoing a  section which was done one week ago. She had the following pathology results: none sent. Since the patient's surgery, she has had typical postoperative discomfort but no symptoms or signs of infection since the surgery. The patient's incision is healing well with no significant drainage. Is still swollen and ecchymotic. She states since the procedure, she has returned to most daily activities, ambulating, and not lifting or exercising. PHYSICAL EXAMINATION Gastrointestinal 
· Abdominal Examination: abdomen minimally tender to palpation, incision intact with no change in size of consistency of hematoma, normal bowel sounds, no masses present · Liver and spleen: no hepatomegaly present, spleen not palpable · Hernias: no hernias identified Skin · General Inspection: no rash, no lesions identified Neurologic/Psychiatric · Mental Status: · Orientation: grossly oriented to person, place and time · Mood and Affect: mood normal, affect appropriate Assessment: 
Postop  checkup with wound hematoma. Elva dressing removed, staples removed and new Elva dressing applied Plan: 
RTO as scheduled for PP checkup unless wound problems.

## 2018-09-20 NOTE — PATIENT INSTRUCTIONS
Postop  Evaluation Chino Nance is a 28 y.o. female returns for a routine post-operative follow-up visit after undergoing a  section which was done 2 weeks ago. She had the following pathology results: none sent. Since the patient's surgery, she has had typical postoperative discomfort but no significant symptoms or problems since the surgery. The patient's incision is healing well with no significant drainage. She states since the procedure, she has returned to most daily activities, ambulating, and not lifting or exercising. PHYSICAL EXAMINATION Gastrointestinal 
· Abdominal Examination: abdomen non-tender to palpation, incision intact and healing well, normal bowel sounds, no masses present · Liver and spleen: no hepatomegaly present, spleen not palpable · Hernias: no hernias identified Skin · General Inspection: no rash, no lesions identified Neurologic/Psychiatric · Mental Status: · Orientation: grossly oriented to person, place and time · Mood and Affect: mood normal, affect appropriate Assessment: 
Normal postop  checkup Plan: 
RTO as scheduled for .

## 2018-10-01 ENCOUNTER — TELEPHONE (OUTPATIENT)
Dept: OBGYN CLINIC | Age: 33
End: 2018-10-01

## 2018-10-03 ENCOUNTER — TELEPHONE (OUTPATIENT)
Dept: OBGYN CLINIC | Age: 33
End: 2018-10-03

## 2018-10-03 NOTE — TELEPHONE ENCOUNTER
Patient is picking up [facility] paperwork that Mike Armstrong completed for patient. On this form, she is noted to return to work as of 11/9/18. Patient is hoping to be able to go back to work as early as Sunday, 10/7/18 for a maximum of 25 hours per week (part time). She is on her feet during her work. Can we write a letter allowing her to go back early? She had her baby 9/14/18. Any restrictions?

## 2018-10-03 NOTE — LETTER
10/3/2018 10:37 AM 
 
Ms. Meena Bueno [address] [facility] [address] To Whom it may concern; 
 
     Meena Bueno is under my care for OB/GYN care. She will be able to return to work as of Sunday, October 7, 2018 , at which time she will be capable of working without any restrictions for up to 25 hours per week until next follow up. We will update again at this time. . 
 
 
 
 
Sincerely, Tia Cross MD

## 2018-10-24 NOTE — TELEPHONE ENCOUNTER
Call received at 150Pm    35year old patient delivered on 9/14/18. Patient is requesting a updated letter stating ok for her to work 31-35 hours per week. Patient has follow up on 11/1/18      ? ok for this nurse to write the updated letter.       Please advise      Patient to  the letter in the am

## 2018-10-24 NOTE — TELEPHONE ENCOUNTER
Letter composed as per MD order , printed,signed and placed in envelope to be picked up by patient it the am.    Patient verbalized understanding.

## 2018-11-01 ENCOUNTER — OFFICE VISIT (OUTPATIENT)
Dept: OBGYN CLINIC | Age: 33
End: 2018-11-01

## 2018-11-01 VITALS
WEIGHT: 119 LBS | RESPIRATION RATE: 16 BRPM | SYSTOLIC BLOOD PRESSURE: 110 MMHG | DIASTOLIC BLOOD PRESSURE: 64 MMHG | BODY MASS INDEX: 21.09 KG/M2 | HEIGHT: 63 IN | HEART RATE: 77 BPM

## 2018-11-01 RX ORDER — NORGESTIMATE AND ETHINYL ESTRADIOL 0.25-0.035
1 KIT ORAL DAILY
Qty: 4 PACKAGE | Refills: 2 | Status: SHIPPED | OUTPATIENT
Start: 2018-11-01 | End: 2020-08-01

## 2018-11-01 NOTE — PROGRESS NOTES
Postpartum evaluation Diana Hagan is a 35 y.o. female who presents for a postpartum exam.  
 
She is now six weeks post repeat  section. Her baby is doing well. She has had no menses since delivery. She has had the following significant problems since her delivery: none The patient is bottle feeding without difficulty. The patient would like to use OCP for birth control. She is currently taking: no medications. She is due for her next AE in 6 months. Visit Vitals /64 (BP 1 Location: Right arm, BP Patient Position: Sitting) Pulse 77 Resp 16 Ht 5' 3\" (1.6 m) Wt 119 lb (54 kg) BMI 21.08 kg/m² PHYSICAL EXAMINATION Constitutional 
· Appearance: well-nourished, well developed, alert, in no acute distress HENT 
· Head and Face: appears normal 
 
Neck · Inspection/Palpation: normal appearance, no masses or tenderness · Lymph Nodes: no lymphadenopathy present · Thyroid: gland size normal, nontender, no nodules or masses present on palpation Breasts · Inspection of Breasts: breasts symmetrical, no skin changes, no discharge present, nipple appearance normal, no skin retraction present · Palpation of Breasts and Axillae: no masses present on palpation, no breast tenderness · Axillary Lymph Nodes: no lymphadenopathy present Gastrointestinal 
· Abdominal Examination: abdomen non-tender to palpation, normal bowel sounds, no masses present · Liver and spleen: no hepatomegaly present, spleen not palpable · Hernias: no hernias identified Genitourinary · External Genitalia: normal appearance for age, no discharge present, no tenderness present, no inflammatory lesions present, no masses present, no atrophy present · Vagina: normal vaginal vault without central or paravaginal defects, no discharge present, no inflammatory lesions present, no masses present · Bladder: non-tender to palpation · Urethra: appears normal 
· Cervix: normal  
 · Uterus: normal size, shape and consistency · Adnexa: no adnexal tenderness present, no adnexal masses present · Perineum: perineum within normal limits, no evidence of trauma, no rashes or skin lesions present · Anus: anus within normal limits, no hemorrhoids present · Inguinal Lymph Nodes: no lymphadenopathy present Skin · General Inspection: no rash, no lesions identified Neurologic/Psychiatric · Mental Status: · Orientation: grossly oriented to person, place and time · Mood and Affect: mood normal, affect appropriate Assessment: 
Normal postpartum check Plan: RTO for AE. Rx for contraception: Betty Blackman

## 2019-05-27 ENCOUNTER — HOSPITAL ENCOUNTER (EMERGENCY)
Dept: GENERAL RADIOLOGY | Age: 34
Discharge: HOME | End: 2019-05-27
Attending: NURSE PRACTITIONER
Payer: MEDICAID

## 2019-05-27 ENCOUNTER — HOSPITAL ENCOUNTER (EMERGENCY)
Age: 34
Discharge: HOME | End: 2019-05-27
Attending: EMERGENCY MEDICINE
Payer: MEDICAID

## 2019-05-27 VITALS
OXYGEN SATURATION: 95 % | DIASTOLIC BLOOD PRESSURE: 78 MMHG | WEIGHT: 120 LBS | BODY MASS INDEX: 21.26 KG/M2 | RESPIRATION RATE: 16 BRPM | SYSTOLIC BLOOD PRESSURE: 139 MMHG | TEMPERATURE: 99.3 F | HEART RATE: 93 BPM

## 2019-05-27 DIAGNOSIS — R07.89 CHEST TIGHTNESS: Primary | ICD-10-CM

## 2019-05-27 DIAGNOSIS — J45.20 MILD INTERMITTENT REACTIVE AIRWAY DISEASE WITHOUT COMPLICATION: ICD-10-CM

## 2019-05-27 DIAGNOSIS — R20.0 ARM NUMBNESS LEFT: ICD-10-CM

## 2019-05-27 LAB
ALBUMIN SERPL-MCNC: 4 G/DL (ref 3.5–5)
ALBUMIN/GLOB SERPL: 1.2 {RATIO} (ref 1.1–2.2)
ALP SERPL-CCNC: 75 U/L (ref 45–117)
ALT SERPL-CCNC: 59 U/L (ref 12–78)
ANION GAP SERPL CALC-SCNC: 5 MMOL/L (ref 5–15)
AST SERPL-CCNC: 28 U/L (ref 15–37)
BASOPHILS # BLD: 0 K/UL (ref 0–0.1)
BASOPHILS NFR BLD: 0 % (ref 0–1)
BILIRUB SERPL-MCNC: 0.5 MG/DL (ref 0.2–1)
BUN SERPL-MCNC: 19 MG/DL (ref 6–20)
BUN/CREAT SERPL: 25 (ref 12–20)
CALCIUM SERPL-MCNC: 9.8 MG/DL (ref 8.5–10.1)
CHLORIDE SERPL-SCNC: 110 MMOL/L (ref 97–108)
CO2 SERPL-SCNC: 24 MMOL/L (ref 21–32)
CREAT SERPL-MCNC: 0.77 MG/DL (ref 0.55–1.02)
DIFFERENTIAL METHOD BLD: NORMAL
EOSINOPHIL # BLD: 0.1 K/UL (ref 0–0.4)
EOSINOPHIL NFR BLD: 1 % (ref 0–7)
ERYTHROCYTE [DISTWIDTH] IN BLOOD BY AUTOMATED COUNT: 12.9 % (ref 11.5–14.5)
GLOBULIN SER CALC-MCNC: 3.4 G/DL (ref 2–4)
GLUCOSE SERPL-MCNC: 97 MG/DL (ref 65–100)
HCG UR QL: NEGATIVE
HCT VFR BLD AUTO: 40.1 % (ref 35–47)
HGB BLD-MCNC: 13.8 G/DL (ref 11.5–16)
IMM GRANULOCYTES # BLD AUTO: 0 K/UL (ref 0–0.04)
IMM GRANULOCYTES NFR BLD AUTO: 0 % (ref 0–0.5)
LIPASE SERPL-CCNC: 60 U/L (ref 73–393)
LYMPHOCYTES # BLD: 2.5 K/UL (ref 0.8–3.5)
LYMPHOCYTES NFR BLD: 25 % (ref 12–49)
MCH RBC QN AUTO: 32.5 PG (ref 26–34)
MCHC RBC AUTO-ENTMCNC: 34.4 G/DL (ref 30–36.5)
MCV RBC AUTO: 94.6 FL (ref 80–99)
MONOCYTES # BLD: 0.7 K/UL (ref 0–1)
MONOCYTES NFR BLD: 7 % (ref 5–13)
NEUTS SEG # BLD: 6.6 K/UL (ref 1.8–8)
NEUTS SEG NFR BLD: 67 % (ref 32–75)
NRBC # BLD: 0 K/UL (ref 0–0.01)
NRBC BLD-RTO: 0 PER 100 WBC
PLATELET # BLD AUTO: 271 K/UL (ref 150–400)
PMV BLD AUTO: 9.5 FL (ref 8.9–12.9)
POTASSIUM SERPL-SCNC: 4.2 MMOL/L (ref 3.5–5.1)
PROT SERPL-MCNC: 7.4 G/DL (ref 6.4–8.2)
RBC # BLD AUTO: 4.24 M/UL (ref 3.8–5.2)
SODIUM SERPL-SCNC: 139 MMOL/L (ref 136–145)
TROPONIN I SERPL-MCNC: <0.05 NG/ML
WBC # BLD AUTO: 10 K/UL (ref 3.6–11)

## 2019-05-27 PROCEDURE — 93005 ELECTROCARDIOGRAM TRACING: CPT

## 2019-05-27 PROCEDURE — 81025 URINE PREGNANCY TEST: CPT

## 2019-05-27 PROCEDURE — 99284 EMERGENCY DEPT VISIT MOD MDM: CPT

## 2019-05-27 PROCEDURE — 80053 COMPREHEN METABOLIC PANEL: CPT

## 2019-05-27 PROCEDURE — 71046 X-RAY EXAM CHEST 2 VIEWS: CPT

## 2019-05-27 PROCEDURE — 36415 COLL VENOUS BLD VENIPUNCTURE: CPT

## 2019-05-27 PROCEDURE — 72040 X-RAY EXAM NECK SPINE 2-3 VW: CPT

## 2019-05-27 PROCEDURE — 83690 ASSAY OF LIPASE: CPT

## 2019-05-27 PROCEDURE — 85025 COMPLETE CBC W/AUTO DIFF WBC: CPT

## 2019-05-27 PROCEDURE — 84484 ASSAY OF TROPONIN QUANT: CPT

## 2019-05-27 RX ORDER — ALBUTEROL SULFATE 90 UG/1
1-2 AEROSOL, METERED RESPIRATORY (INHALATION)
Qty: 1 INHALER | Refills: 1 | Status: SHIPPED | OUTPATIENT
Start: 2019-05-27

## 2019-05-27 RX ORDER — METHYLPREDNISOLONE 4 MG/1
TABLET ORAL
Qty: 1 DOSE PACK | Refills: 0 | Status: SHIPPED | OUTPATIENT
Start: 2019-05-27 | End: 2022-03-23

## 2019-05-27 NOTE — ED TRIAGE NOTES
Chest tight x 2 weeks, hx of asthma. Relief with inhaler. Left arm numbness started last night midnight. Sensation the same in both arms,  strength equal. Reports dropping coffee cup this am. Reports small insect bite to top of left hand.  Patient conversational

## 2019-05-27 NOTE — DISCHARGE INSTRUCTIONS
Patient Education     Asthma Action Plan: After Your Child's Visit  Your Care Instructions  An asthma action plan is based on peak flow and asthma symptoms. Sorting symptoms and peak flow into red, yellow, and green \"zones\" can help you know how bad your child's asthma is and what actions you should take. Work with the doctor to make the plan. An action plan may include:  · The peak flow readings and symptoms for each zone. · What medicines your child should take in each zone. · When to call a doctor. · A list of emergency contact numbers. · A list of your child's asthma triggers. Follow-up care is a key part of your child's treatment and safety. Be sure to make and go to all appointments, and call your doctor if your child is having problems. It's also a good idea to know your child's test results and keep a list of the medicines your child takes. How can you care for your child at home? · Make sure your child takes his or her daily medicines to help minimize long-term damage and avoid asthma attacks. · Check your child's peak flow as often as your doctor suggests. This is the best way to know how well the lungs are working. · Check the action plan to see what zone your child is in.  ¨ If your child is in the green zone, he or she should keep taking daily asthma medicines as prescribed. ¨ If your child is in the yellow zone, he or she may be having or will soon have an asthma attack. There may not be any symptoms, but your child's lungs are not working as well as they should. Make sure your child takes the medicines listed in the action plan. If your child stays in the yellow zone, your doctor may need to increase the dose or add a medicine. ¨ If your child is in the red zone, follow the action plan. If symptoms or peak flow don't improve soon, your child may need to go to the emergency room or be admitted to the hospital.  · Use an asthma diary.  Write down your child's peak flow readings in the asthma diary. If your child has an attack, write down what caused it (if you know), the symptoms, and what medicine your child took. · Make sure you know how and when to call your doctor or go to the hospital.  · Take both the asthma action plan and the asthma diary--along with the peak flow meter and medicines--when you take your child to the doctor. Tell the doctor if your child is having trouble following the action plan. When should you call for help? Call 911 anytime you think your child may need emergency care. For example, call if:  · Your child has severe trouble breathing. Signs may include the chest sinking in, using belly muscles to breathe, or nostrils flaring while your child is struggling to breathe. Call your doctor now or seek immediate medical care if:  · Your child has an asthma attack and does not get better after you use the action plan. · Your child coughs up yellow, dark brown, or bloody mucus (sputum). Watch closely for changes in your child's health, and be sure to contact your doctor if:  · Your child's wheezing and coughing get worse. · Your child needs quick-relief medicine on more than 2 days a week (unless it is just for exercise). · Your child has any new symptoms, such as a fever. Where can you learn more? Go to [employer]  Enter Z611 in the search box to learn more about \"Asthma Action Plan: After Your Child's Visit. \"   © 9367-9631 [employer]. Care instructions adapted under license by [employer] (which disclaims liability or warranty for this information). This care instruction is for use with your licensed healthcare professional. If you have questions about a medical condition or this instruction, always ask your healthcare professional. [place] [address] any warranty or liability for your use of this information. Content Version: 60.5.299756;  Last Revised: August 29, 2012

## 2019-05-27 NOTE — ED PROVIDER NOTES
I have evaluated the patient as the Provider in Triage. I have reviewed Her vital signs and the triage nurse assessment. I have talked with the patient and any available family and advised that I am the provider in triage and have ordered the appropriate study to initiate their work up based on the clinical presentation during my assessment. I have advised that the patient will be accommodated in the Main ED as soon as possible. I have also requested to contact the triage nurse or myself immediately if the patient experiences any changes in their condition during this brief waiting period. 35 y.o. female with past medical history significant for asthma, h/o drug addiction, and ADHD who presents via private vehicle with chief complaint of chest pain. Pt c/o chest pain described as \"tightness and pressure,\" some SOB and cough for two weeks. She attributed her chest pain and SOB to seasonal allergies, stating that her symptoms alleviated when she used her inhaler. Then about 14 hours ago pt started with  left arm tingling which is new. She mentions she dropped her coffee cup today due to her sx. Pt also noticed she had an insect bite to her left hand. Pt denies dysuria, nausea, and vomiting. There are no other acute medical concerns at this time. Social hx: Daily smoker; Denies use of EtOH  PCP: None    Note written by Prashant Gregory, as dictated by Carmita Basilio MD 2:30 PM    This is a 51-year-old female presents to the emergency room with complaints of chest tightness x2 weeks. Patient states she has a history of asthma, and has been taking her inhaler with positive relief of chest tightness. Patient also has a nonproductive cough. Patient states she developed chest tightness and shortness of breath again today along with left arm numbness and tingling that started around midnight. Patient denies any weakness in any extremity.  Patient states she dropped her cup of coffee this morning but got concerned because she did not have a good  on the cup. Patient denies chest pain, dizziness, increased fatigue. Denies any weakness in her lower extremities. States the numbness in her left arm is up near her shoulder. Denies any jaw pain, denies any back pain. Patient is a smoker, smoking approximately one pack per day. There no further complaints at this time. The history is provided by the patient. No  was used. Past Medical History:   Diagnosis Date    Abnormal Pap smear 11    LGSIL + HPV    Abnormal Papanicolaou smear of cervix     ADHD (attention deficit hyperactivity disorder)     Asthma     Cervical high risk HPV (human papillomavirus) test positive 2018    HPV test positive 11    Low grade squamous intraepithelial lesion (LGSIL) on Papanicolaou smear of cervix 2018    Postpartum depression     Psychiatric problem     drug addiction- opiates, initial dependency 5 years ago. .methadone [place]    Trichomonas infection 2014       Past Surgical History:   Procedure Laterality Date     DELIVERY ONLY      HX DILATION AND CURETTAGE      HX OTHER SURGICAL      tonsils    HX OTHER SURGICAL      wisdom teeth extraction         Family History:   Problem Relation Age of Onset    Drug Abuse Mother     Drug Abuse Father        Social History     Socioeconomic History    Marital status: SINGLE     Spouse name: Not on file    Number of children: Not on file    Years of education: Not on file    Highest education level: Not on file   Occupational History    Not on file   Social Needs    Financial resource strain: Not on file    Food insecurity:     Worry: Not on file     Inability: Not on file    Transportation needs:     Medical: Not on file     Non-medical: Not on file   Tobacco Use    Smoking status: Current Every Day Smoker     Packs/day: 1.00     Years: 15.00     Pack years: 15.00    Smokeless tobacco: Never Used Substance and Sexual Activity    Alcohol use: No    Drug use: Yes     Types: Other     Comment: subutex    Sexual activity: Yes     Partners: Male     Birth control/protection: None     Comment: methadone   Lifestyle    Physical activity:     Days per week: Not on file     Minutes per session: Not on file    Stress: Not on file   Relationships    Social connections:     Talks on phone: Not on file     Gets together: Not on file     Attends Mandaeism service: Not on file     Active member of club or organization: Not on file     Attends meetings of clubs or organizations: Not on file     Relationship status: Not on file    Intimate partner violence:     Fear of current or ex partner: Not on file     Emotionally abused: Not on file     Physically abused: Not on file     Forced sexual activity: Not on file   Other Topics Concern    Not on file   Social History Narrative    Not on file         ALLERGIES: Cephalosporins; Codeine; and Pcn [penicillins]    Review of Systems   Constitutional: Negative for appetite change, chills, diaphoresis, fatigue and fever. HENT: Negative for congestion, ear discharge, ear pain, sinus pressure, sinus pain, sore throat and trouble swallowing. Eyes: Negative for photophobia, pain, redness and visual disturbance. Respiratory: Positive for cough, chest tightness and shortness of breath. Negative for wheezing. Cardiovascular: Negative for chest pain and palpitations. Gastrointestinal: Negative for abdominal distention, abdominal pain, nausea and vomiting. Endocrine: Negative. Genitourinary: Negative for difficulty urinating, flank pain, frequency and urgency. Musculoskeletal: Negative for back pain, neck pain and neck stiffness. Skin: Negative for color change, pallor, rash and wound. Allergic/Immunologic: Negative. Neurological: Positive for numbness (left upper arm). Negative for dizziness, speech difficulty, weakness and headaches.    Hematological: Does not bruise/bleed easily. Psychiatric/Behavioral: Negative for behavioral problems. The patient is not nervous/anxious. Vitals:    05/27/19 1426   BP: 139/78   Pulse: 93   Resp: 16   Temp: 99.3 °F (37.4 °C)   SpO2: 95%   Weight: 54.4 kg (120 lb)            Physical Exam   Constitutional: She is oriented to person, place, and time. She appears well-developed and well-nourished. No distress. HENT:   Head: Normocephalic and atraumatic. Right Ear: External ear normal.   Left Ear: External ear normal.   Nose: Nose normal.   Mouth/Throat: Oropharynx is clear and moist.   Eyes: Pupils are equal, round, and reactive to light. Conjunctivae and EOM are normal. Right eye exhibits no discharge. Left eye exhibits no discharge. Neck: Normal range of motion. Neck supple. No JVD present. No tracheal deviation present. No pain on palpation to the cervical spine,   no stepoffs, no deformities. Full range of motion, left upper arm numbness and tingling stated   Cardiovascular: Normal rate, regular rhythm, normal heart sounds and intact distal pulses. Exam reveals no gallop. No murmur heard. Pulmonary/Chest: Effort normal. No respiratory distress. She has wheezes. She has no rales. She exhibits no tenderness. Decreased breath sounds throughout with inspiratory wheezes cleared with cough. Abdominal: Soft. Bowel sounds are normal. She exhibits no distension. There is no tenderness. There is no rebound and no guarding. Genitourinary:   Genitourinary Comments: Negative     Musculoskeletal: Normal range of motion. She exhibits no edema or tenderness. Neurological: She is alert and oriented to person, place, and time. Numbness and tingling stated to the left upper arm   Skin: Skin is warm and dry. No rash noted. No erythema. No pallor. Psychiatric: She has a normal mood and affect. Her behavior is normal. Judgment and thought content normal.   Nursing note and vitals reviewed.        [facility] Procedures    5:00 PM  Patient attempting to smoke a cigarette while waiting for radiology. Explained the need for smoking cessation and no smoking policy at [facility].  8:49 PM  Pt has been reexamined. Pt has no new complaints, changes or physical findings. Care plan outlined and precautions discussed. All available results were reviewed with pt. All medications were reviewed with pt. All of pt's questions and concerns were addressed. Pt agrees to F/U as instructed and agrees to return to ED upon further deterioration. Pt is ready to go home.   Marva Mercer NP

## 2019-05-28 LAB
ATRIAL RATE: 91 BPM
CALCULATED P AXIS, ECG09: 79 DEGREES
CALCULATED R AXIS, ECG10: 84 DEGREES
CALCULATED T AXIS, ECG11: 43 DEGREES
DIAGNOSIS, 93000: NORMAL
P-R INTERVAL, ECG05: 154 MS
Q-T INTERVAL, ECG07: 334 MS
QRS DURATION, ECG06: 74 MS
QTC CALCULATION (BEZET), ECG08: 410 MS
VENTRICULAR RATE, ECG03: 91 BPM

## 2020-01-24 ENCOUNTER — TELEPHONE (OUTPATIENT)
Dept: OBGYN CLINIC | Age: 35
End: 2020-01-24

## 2020-01-24 NOTE — TELEPHONE ENCOUNTER
01/24/20  12:37- spoke with patient and she has been advised that her doctor, [address] [place] [address] Dr Romano is out of office this week. I offered to send information to covering physician and she declined. She will talk with [address] [place] [address] Dr Romano directly on Tuesday of next week. No message needs to be sent.

## 2020-02-07 ENCOUNTER — OFFICE VISIT (OUTPATIENT)
Dept: OBGYN CLINIC | Age: 35
End: 2020-02-07

## 2020-02-07 VITALS
SYSTOLIC BLOOD PRESSURE: 122 MMHG | RESPIRATION RATE: 19 BRPM | WEIGHT: 119 LBS | BODY MASS INDEX: 21.09 KG/M2 | DIASTOLIC BLOOD PRESSURE: 70 MMHG | HEIGHT: 63 IN

## 2020-02-07 DIAGNOSIS — Z34.81 PRENATAL CARE, SUBSEQUENT PREGNANCY IN FIRST TRIMESTER: Primary | ICD-10-CM

## 2020-02-07 DIAGNOSIS — Z34.81 ENCOUNTER FOR SUPERVISION OF OTHER NORMAL PREGNANCY, FIRST TRIMESTER: ICD-10-CM

## 2020-02-07 PROBLEM — Z34.82 ENCOUNTER FOR SUPERVISION OF OTHER NORMAL PREGNANCY, SECOND TRIMESTER: Status: RESOLVED | Noted: 2018-03-30 | Resolved: 2020-02-07

## 2020-02-07 LAB
CHLAMYDIA, EXTERNAL: NEGATIVE
N. GONORRHEA, EXTERNAL: NEGATIVE
PAP SMEAR, EXTERNAL: NORMAL

## 2020-02-07 NOTE — PROGRESS NOTES
Current pregnancy history:    Keren Del Valle is a 29 y.o. female who presents for the evaluation of pregnancy. No LMP recorded. LMP history:  The date of her LMP is certain. Her last menstrual period was normal and lasted for 4 to 5 days. A urine pregnancy test was positive 4 weeks ago. She was not on the pill at conception. Based on her LMP, her EDC is 2020 and her EGA is 9 weeks, 5 days. Her menstrual cycles are regular and occur approximately every 28 days  and range from 3 to 5 days. The last menses lasted the usual number of days. Ultrasound data:  She had an  ultrasound done by the physician today. Transvaginal First Trimester Ultrasound Procedure    Keren Del Valle is a ,  29 y.o. female [facility] No LMP recorded. She is here today for early pregnancy ultrasound for pregnancy dating. Ultrasound results:   A balderrama intrauterine pregnancy with visible cardiac activity is seen. The yolk sac is visible and measures approximately 0.52 cm in diameter. The crown rump length of the fetus is measurable at 3.01 cm, consistent with 9 weeks and 6 days  Subchorionic hemorrhage was not seen  Right Ovary - NORMAL with CL cyst  Left Ovary - Not well seen  Comment:        Pregnancy symptoms:    Since her LMP she has experienced  urinary frequency, breast tenderness, and nausea. She has not been vomiting over the last few weeks. Associated signs and symptoms which she denies: dysuria, discharge, vaginal bleeding. She states she has lost weight:  Approximately 2 pounds over the last few weeks. Relevant past pregnancy history:   She has the following pregnancy history: Her last pregnancy was uncomplicated. She has no history of  delivery. Relevant past medical history:(relevant to this pregnancy): noncontributory. Pap/Occupational history:  Last pap smear: last year Results: Normal      Her occupation is: .      Substance history: negative for street drugs. Positive for alcohol and tobacco   Exposure history: There is/are cat/s in the home. The patient was instructed to not change the cat litter. She admits close contact with children on a regular basis. She has had chicken pox or the vaccine in the past.   Patient denies issues with domestic violence. Genetic Screening/Teratology Counseling: (Includes patient, baby's father, or anyone in either family with:)  3.  Patient's age >/= 28 [facility]?-- no.   2.  [facility]alassemia ([facility] [place], [address], or  background): MCV<80?--no.     3.  Neural tube defect (meningomyelocele, spina bifida, anencephaly)?--no.   4.  Congenital heart defect?--no.  5.  Down syndrome?--no.   6.  Adrian-Sachs ([place], [place])?--no.   7.  Canavan's Disease?--no.   8.  Familial Dysautonomia?--no.   9.  Sickle cell disease or trait ()? --no   The patient has not been tested for sickle trait  10. Hemophilia or other blood disorders?--no. 11.  Muscular dystrophy?--no. 12.  Cystic fibrosis?--no. 13.  [place]'s Chorea?--no. 14.  Mental retardation/autism (if yes was person tested for Fragile X)?--no. 15.  Other inherited genetic or chromosomal disorder?--no. 12.  Maternal metabolic disorder (DM, PKU, etc)?--no. 17.  Patient or FOB with a child with a birth defect not listed above?--no.  17a. Patient or FOB with a birth defect themselves?--no. 18.  Recurrent pregnancy loss, or stillbirth?--no. 19.  Any medications since LMP other than prenatal vitamins (include vitamins, supplements, OTC meds, drugs, alcohol)? --yes. 20.  Any other genetic/environmental exposure to discuss?--no. Infection History:  1. Lives with someone with TB or TB exposed?--no.   2.  Patient or partner has history of genital herpes?--no.  3.  Rash or viral illness since LMP?--no.    4.  History of STD (GC, CT, HPV, syphilis, HIV)? --H/O HPV  5.  Other: no    Past Medical History:   Diagnosis Date    Abnormal Pap smear 11    LGSIL + HPV    Abnormal Papanicolaou smear of cervix     ADHD (attention deficit hyperactivity disorder)     Asthma     Cervical high risk HPV (human papillomavirus) test positive 2018    HPV test positive 11    Low grade squamous intraepithelial lesion (LGSIL) on Papanicolaou smear of cervix 2018    Postpartum depression     Psychiatric problem     drug addiction- opiates, initial dependency 5 years ago. .methadone [place]    Trichomonas infection 2014     Past Surgical History:   Procedure Laterality Date     DELIVERY ONLY      HX DILATION AND CURETTAGE      HX OTHER SURGICAL      tonsils    HX OTHER SURGICAL      wisdom teeth extraction     Social History     Occupational History    Not on file   Tobacco Use    Smoking status: Current Every Day Smoker     Packs/day: 1.00     Years: 15.00     Pack years: 15.00    Smokeless tobacco: Never Used   Substance and Sexual Activity    Alcohol use: No    Drug use: Yes     Types: Other     Comment: subutex    Sexual activity: Yes     Partners: Male     Birth control/protection: None     Comment: methadone     Family History   Problem Relation Age of Onset    Drug Abuse Mother     Drug Abuse Father        Allergies   Allergen Reactions    Cephalosporins Hives    Codeine Hives    Pcn [Penicillins] Hives     Prior to Admission medications    Medication Sig Start Date End Date Taking? Authorizing Provider   albuterol (PROVENTIL HFA, VENTOLIN HFA, PROAIR HFA) 90 mcg/actuation inhaler Take 1-2 Puffs by inhalation every four (4) hours as needed for Wheezing. 19   Marylin Underwood NP   methylPREDNISolone (MEDROL, WENDY,) 4 mg tablet Take as directed 19   Marylin Underwood NP   norgestimate-ethinyl estradiol ([id number],) 0.25-35 mg-mcg tab Take 1 Tab by mouth daily. 18   Dar Sanchez MD   acetaminophen (TYLENOL) 325 mg tablet Take 2 Tabs by mouth every four (4) hours as needed. 9/17/18   Devonna Kehr, MD   ibuprofen (MOTRIN) 800 mg tablet Take 1 Tab by mouth every eight (8) hours as needed for Pain. 9/17/18   Devonna Kehr, MD   buprenorphine HCl (SUBUTEX) 8 mg sublingual tablet by SubLINGual route daily. Take 2 and 1/2 by mouth daily    Provider, Historical   PNV No12-Iron-FA-DSS-OM-3 29 mg iron-1 mg -50 mg CPKD Take  by mouth. Provider, Historical   Dextromethorphan-guaiFENesin (MUCINEX DM) 60-1,200 mg TM12 Take 1 Tab by mouth every twelve (12) hours as needed (congestion). 9/27/15   Flasschoen, Levy Dawley P, PA   amphetamine-dextroamphetamine (ADDERALL) 20 mg tablet Take 20 mg by mouth two (2) times a day. Indications: ATTENTION-DEFICIT HYPERACTIVITY DISORDER    Provider, Historical        Review of Systems: History obtained from the patient  Constitutional: negative for weight loss, fever, night sweats  HEENT: negative for hearing loss, earache, congestion, snoring, sorethroat  CV: negative for chest pain, palpitations, edema  Resp: negative for cough, shortness of breath, wheezing  Breast: negative for breast lumps, nipple discharge, galactorrhea  GI: negative for change in bowel habits, abdominal pain, black or bloody stools  : negative for frequency, dysuria, hematuria, vaginal discharge  MSK: negative for back pain, joint pain, muscle pain  Skin: negative for itching, rash, hives  Neuro: negative for dizziness, headache, confusion, weakness  Psych: negative for anxiety, depression, change in mood  Heme/lymph: negative for bleeding, bruising, pallor    Objective: There were no vitals taken for this visit.     Physical Exam:   PHYSICAL EXAMINATION    Constitutional  · Appearance: well-nourished, well developed, alert, in no acute distress    HENT  · Head  · Face: appears normal  · Eyes: appear normal  · Ears: normal  · Mouth: normal  · Lips: no lesions    Neck  · Inspection/Palpation: normal appearance, no masses or tenderness  · Lymph Nodes: no lymphadenopathy present  · Thyroid: gland size normal, nontender, no nodules or masses present on palpation    Chest  · Respiratory Effort: breathing unlabored  · Auscultation: normal breath sounds    Cardiovascular  · Heart:  · Auscultation: regular rate and rhythm without murmur    Breasts  · Inspection of Breasts: breasts symmetrical, no skin changes, no discharge present, nipple appearance normal, no skin retraction present  · Palpation of Breasts and Axillae: no masses present on palpation, no breast tenderness  · Axillary Lymph Nodes: no lymphadenopathy present    Gastrointestinal  · Abdominal Examination: abdomen non-tender to palpation, normal bowel sounds, no masses present  · Liver and spleen: no hepatomegaly present, spleen not palpable  · Hernias: no hernias identified    Genitourinary  · External Genitalia: normal appearance for age, no discharge present, no tenderness present, no inflammatory lesions present, no masses present, no atrophy present  · Vagina: normal vaginal vault without central or paravaginal defects, no discharge present, no inflammatory lesions present, no masses present  · Bladder: non-tender to palpation  · Urethra: appears normal  · Cervix: normal   · Uterus: enlarged, normal shape, soft  · Adnexa: no adnexal tenderness present, no adnexal masses present  · Perineum: perineum within normal limits, no evidence of trauma, no rashes or skin lesions present  · Anus: anus within normal limits, no hemorrhoids present  · Inguinal Lymph Nodes: no lymphadenopathy present    Skin  · General Inspection: no rash, no lesions identified    Neurologic/Psychiatric  · Mental Status:  · Orientation: grossly oriented to person, place and time  · Mood and Affect: mood normal, affect appropriate    Assessment:   Intrauterine pregnancy with the following problems identified: Smoker, some alcohol first trimester, 3 previous C/S.         Plan:   PN lab with [facility] next in 2 weeks  Offered CF testing, CVS, Nuchal Translucency, MSAFP, amnio, and discussed NIPT  Course of pregnancy discussed including visit schedule, routine U/S, glucola testing, etc.  Avoid alcoholic beverages and illicit/recreational drugs use  Take prenatal vitamins or folic acid daily. Hospital and practice style discussed with coverage system. Discussed nutrition, toxoplasmosis precautions, sexual activity, exercise, need for influenza vaccine, environmental and work hazards, travel advice, screen for domestic violence, need for seat belts. Discussed seafood, unpasteurized dairy products, deli meat, artificial sweeteners, and caffeine. Information on prenatal classes/breastfeeding given. Information on circumcision given  Patient encouraged not to smoke. Discussed current prescription drug use. Given medication list.  Discussed the use of over the counter medications and chemicals. Route of delivery discussed, including risks, benefits, and not a candidate for  versus repeat LTCS. Pt understands risk of hemorrhage during pregnancy and post delivery and would accept blood products if necessary in life-threatening emergencies      Handouts given to pt.

## 2020-02-09 LAB — BACTERIA UR CULT: ABNORMAL

## 2020-02-11 ENCOUNTER — TELEPHONE (OUTPATIENT)
Dept: OBGYN CLINIC | Age: 35
End: 2020-02-11

## 2020-02-11 RX ORDER — NITROFURANTOIN 25; 75 MG/1; MG/1
100 CAPSULE ORAL 2 TIMES DAILY
Qty: 14 CAP | Refills: 0 | Status: SHIPPED | OUTPATIENT
Start: 2020-02-11 | End: 2020-02-11

## 2020-02-11 RX ORDER — NITROFURANTOIN 25; 75 MG/1; MG/1
100 CAPSULE ORAL 2 TIMES DAILY
Qty: 14 CAP | Refills: 0 | Status: SHIPPED | OUTPATIENT
Start: 2020-02-11 | End: 2020-02-18

## 2020-02-11 NOTE — TELEPHONE ENCOUNTER
Patient has been advised of lab results.   Notes recorded by Juliane Manzanares MD on 2/10/2020 at 7:59 AM EST  UTI-rx Macrobid for 7d          Specimen Information: Urine        Component   Urine Culture, Routine Abnormal    Escherichia coli   Greater than 100,000 colony forming units per mL

## 2020-02-14 LAB
C TRACH RRNA CVX QL NAA+PROBE: NEGATIVE
CYTOLOGIST CVX/VAG CYTO: ABNORMAL
CYTOLOGY CVX/VAG DOC CYTO: ABNORMAL
CYTOLOGY CVX/VAG DOC THIN PREP: ABNORMAL
CYTOLOGY HISTORY:: ABNORMAL
DX ICD CODE: ABNORMAL
DX ICD CODE: ABNORMAL
HPV I/H RISK 1 DNA CVX QL PROBE+SIG AMP: POSITIVE
Lab: ABNORMAL
N GONORRHOEA RRNA CVX QL NAA+PROBE: NEGATIVE
OTHER STN SPEC: ABNORMAL
PATHOLOGIST CVX/VAG CYTO: ABNORMAL
STAT OF ADQ CVX/VAG CYTO-IMP: ABNORMAL

## 2020-02-18 NOTE — PROGRESS NOTES
Notified pt of results. She verbalized understanding  She is currently pregnant.  Advised patient we will perform colpo at her next visit with no bx

## 2020-02-24 NOTE — PROGRESS NOTES
[facility]  OFFICE PROCEDURE PROGRESS NOTE           Chart reviewed for the following:  I, Silvestre Denny, have reviewed the History, Physical and updated the Allergic reactions for [address] performed immediately prior to start of procedure:  Lizet Hoyos, have performed the following reviews on Nicholas Cuellar prior to the start of the procedure:      * Patient was identified by name and date of birth   * Agreement on procedure being performed was verified  * Risks and Benefits explained to the patient  * Procedure site verified and marked as necessary  * Patient was positioned for comfort  * Consent was signed and verified      Time: 10:15am        Date of procedure: 2/24/2020     Procedure performed by: Edmund Garcia MD     Provider assisted by: Silvestre Denny LPN     Patient assisted by: self     How tolerated by patient: tolerated the procedure well with no complications     Post Procedural Pain Scale: 0 - No Hurt     Comments: none    Procedure Note: Colposcopy during pregnancy    Nicholas Cuellar is a [address] [place] [address],  29 y.o. female [facility] Patient's last menstrual period was 12/01/2019. She presents for colposcopy for evaluation of a cervical abnormality with a pap smear abnormality consisting of ASCUS and HPV. The patient is pregnant. After being presented with the risks, benefits and alternatives has she signed a consent for the procedure. She states that she understands the need for the procedure and has no further questions. She was informed that she may experience discomfort. Procedure:  She was positioned in the dorsal lithotomy position and a speculum was inserted into the vagina. Dilute acetic acid was applied to the cervix. The colposcope was used to visualize the cervix. Procedure: The transformation zone was completely visualized. Findings: This colposcopy was satisfactory. The procedure was notable for white epithelium on the cervix.  Biopsies were not taken from the cervix . See accompanying diagram.  Nothing was applied to the cervix for hemostasis. Endocervical currettage: An endocervical curettage was not performed. Post Procedure Status: The patient tolerated the procedure well with minimal discomfort. She was observed for 10 minutes and released in good condition.

## 2020-02-26 ENCOUNTER — ROUTINE PRENATAL (OUTPATIENT)
Dept: OBGYN CLINIC | Age: 35
End: 2020-02-26

## 2020-02-26 VITALS
HEIGHT: 63 IN | SYSTOLIC BLOOD PRESSURE: 128 MMHG | WEIGHT: 118.4 LBS | DIASTOLIC BLOOD PRESSURE: 60 MMHG | BODY MASS INDEX: 20.98 KG/M2

## 2020-02-26 DIAGNOSIS — R87.610 ATYPICAL SQUAMOUS CELLS OF UNDETERMINED SIGNIFICANCE (ASCUS) ON PAPANICOLAOU SMEAR OF CERVIX: ICD-10-CM

## 2020-02-26 DIAGNOSIS — Z34.81 PRENATAL CARE, SUBSEQUENT PREGNANCY IN FIRST TRIMESTER: Primary | ICD-10-CM

## 2020-02-26 LAB
ANTIBODY SCREEN, EXTERNAL: NEGATIVE
HBSAG, EXTERNAL: NEGATIVE
HCT, EXTERNAL: 36.8
HGB, EXTERNAL: 12.4
HIV, EXTERNAL: NEGATIVE
PLATELET CNT,   EXTERNAL: 296
RUBELLA, EXTERNAL: NORMAL
T. PALLIDUM, EXTERNAL: NEGATIVE
TYPE, ABO & RH, EXTERNAL: NORMAL

## 2020-02-26 NOTE — PATIENT INSTRUCTIONS
Weeks 10 to 14 of Your Pregnancy: Care Instructions  Your Care Instructions    By weeks 10 to 14 of your pregnancy, the placenta has formed inside your uterus. It is possible to hear your baby's heartbeat with a special ultrasound device. Your baby's eyes can and do move. The arms and legs can bend. This is a good time to think about testing for birth defects. There are two types of tests: screening and diagnostic. Screening tests show the chance that a baby has a certain birth defect. They can't tell you for sure that your baby has a problem. Diagnostic tests show if a baby has a certain birth defect. It's your choice whether to have these tests. You and your partner can talk to your doctor or midwife about birth defects tests. Follow-up care is a key part of your treatment and safety. Be sure to make and go to all appointments, and call your doctor if you are having problems. It's also a good idea to know your test results and keep a list of the medicines you take. How can you care for yourself at home? Decide about tests  · You can have screening tests and diagnostic tests to check for birth defects. The decision to have a test for birth defects is personal. Think about your age, your chance of passing on a family disease, your need to know about any problems, and what you might do after you have the test results. ? Triple or quadruple (quad) blood tests. These screening tests can be done between 15 and 20 weeks of pregnancy. They check the amounts of three or four substances in your blood. The doctor looks at these test results, along with your age and other factors, to find out the chance that your baby may have certain problems. ? Amniocentesis. This diagnostic test is used to look for chromosomal problems in the baby's cells.  It can be done between 15 and 20 weeks of pregnancy, usually around week 16.  ? Nuchal translucency test. This test uses ultrasound to measure the thickness of the area at the back of the baby's neck. An increase in the thickness can be an early sign of Down syndrome. ? Chorionic villus sampling (CVS). This is a test that looks for certain genetic problems with your baby. The same genes that are in your baby are in the placenta. A small piece of the placenta is taken out and tested. This test is done when you are 10 to 13 weeks pregnant. Ease discomfort  · Slow down and take naps when you feel tired. · If your emotions swing, talk to someone. Crying, anxiety, and concentration problems are common. · If your gums bleed, try a softer toothbrush. If your gums are puffy and bleed a lot, see your dentist.  · If you feel dizzy:  ? Get up slowly after sitting or lying down. ? Drink plenty of fluids. ? Eat small snacks to keep your blood sugar stable. ? Put your head between your legs as though you were tying your shoelaces. ? Lie down with your legs higher than your head. Use pillows to prop up your feet. · If you have a headache:  ? Lie down. ? Ask your partner or a good friend for a neck massage. ? Try cool cloths over your forehead or across the back of your neck. ? Use acetaminophen (Tylenol) for pain relief. Do not use nonsteroidal anti-inflammatory drugs (NSAIDs), such as ibuprofen (Advil, Motrin) or naproxen (Aleve), unless your doctor says it is okay. · If you have a nosebleed, pinch your nose gently, and hold it for a short while. To prevent nosebleeds, try massaging a small dab of petroleum jelly, such as Vaseline, in your nostrils. · If your nose is stuffed up, try saline (saltwater) nose sprays. Do not use decongestant sprays. Care for your breasts  · Wear a bra that gives you good support. · Know that changes in your breasts are normal.  ? Your breasts may get larger and more tender. Tenderness usually gets better by 12 weeks. ? Your nipples may get darker and larger, and small bumps around your nipples may show more. ?  The veins in your chest and breasts may show more. · Don't worry about \"toughening'\" your nipples. Breastfeeding will naturally do this. Where can you learn more? Go to http://mingo-kerri.info/. Enter R653 in the search box to learn more about \"Weeks 10 to 14 of Your Pregnancy: Care Instructions. \"  Current as of: May 29, 2019  Content Version: 12.2  © 9095-6048 [employer]. Care instructions adapted under license by [employer] (which disclaims liability or warranty for this information). If you have questions about a medical condition or this instruction, always ask your healthcare professional. [address] any warranty or liability for your use of this information.

## 2020-02-27 LAB — URINALYSIS, EXTERNAL: NORMAL

## 2020-02-28 LAB
ABO GROUP BLD: NORMAL
BLD GP AB SCN SERPL QL: NEGATIVE
ERYTHROCYTE [DISTWIDTH] IN BLOOD BY AUTOMATED COUNT: 11.9 % (ref 11.7–15.4)
HBV SURFACE AG SERPL QL IA: NEGATIVE
HCT VFR BLD AUTO: 36.8 % (ref 34–46.6)
HGB BLD-MCNC: 12.4 G/DL (ref 11.1–15.9)
HIV 1+2 AB+HIV1 P24 AG SERPL QL IA: NON REACTIVE
MCH RBC QN AUTO: 32.9 PG (ref 26.6–33)
MCHC RBC AUTO-ENTMCNC: 33.7 G/DL (ref 31.5–35.7)
MCV RBC AUTO: 98 FL (ref 79–97)
PLATELET # BLD AUTO: 296 X10E3/UL (ref 150–450)
RBC # BLD AUTO: 3.77 X10E6/UL (ref 3.77–5.28)
RH BLD: POSITIVE
RUBV IGG SERPL IA-ACNC: 2.73 INDEX
TREPONEMA PALLIDUM IGG+IGM AB [PRESENCE] IN SERUM OR PLASMA BY IMMUNOASSAY: NON REACTIVE
WBC # BLD AUTO: 9.2 X10E3/UL (ref 3.4–10.8)

## 2020-03-03 LAB — NIPT, EXTERNAL: NORMAL

## 2020-03-09 ENCOUNTER — TELEPHONE (OUTPATIENT)
Dept: OBGYN CLINIC | Age: 35
End: 2020-03-09

## 2020-03-10 ENCOUNTER — TELEPHONE (OUTPATIENT)
Dept: OBGYN CLINIC | Age: 35
End: 2020-03-10

## 2020-03-10 NOTE — LETTER
3/10/2020 2:44 PM 
 
Ms. Vivian Mcgowan 
[address] [facility] [place] [phone] To Dental Consultant, 
 
Vivian Mcgowan is in need of dental care. This patient is obstetrically cleared for dental procedures with the following considerations: 1. Please use an abdominal and thyroid shield when dental x-rays. 2.  Broad spectrum Penicillins or Cephalosporins and Tylenol products my be 
                 used as needed. Tetracyclines, Nsaids and Quinolones are contraindicated  
                 in pregnancy. 3.  Local Anesthesia without Epinepherine is permissible. Sincerely, Clarisa Johnston MD

## 2020-03-23 ENCOUNTER — TELEPHONE (OUTPATIENT)
Dept: OBGYN CLINIC | Age: 35
End: 2020-03-23

## 2020-03-23 NOTE — TELEPHONE ENCOUNTER
Patient of [address] [facility] because she has appt set for her FOB on 3/25/20 (Wed) at 9:40 for blood work (AFP) and to see [address] [place] [address] Dr Romano. She is having problems with her hand, shoulder, neck and arm going completely numb in the middle of the night and first thing in am.    She wanted to verify that you are good to still see her. She wants to come if possible.

## 2020-04-20 ENCOUNTER — ROUTINE PRENATAL (OUTPATIENT)
Dept: OBGYN CLINIC | Age: 35
End: 2020-04-20

## 2020-04-20 VITALS
WEIGHT: 120 LBS | HEIGHT: 63 IN | BODY MASS INDEX: 21.26 KG/M2 | DIASTOLIC BLOOD PRESSURE: 62 MMHG | SYSTOLIC BLOOD PRESSURE: 122 MMHG

## 2020-04-20 DIAGNOSIS — Z34.82 PRENATAL CARE, SUBSEQUENT PREGNANCY IN SECOND TRIMESTER: Primary | ICD-10-CM

## 2020-04-20 LAB — AFP, MATERNAL, EXTERNAL: NEGATIVE

## 2020-04-20 NOTE — PROGRESS NOTES
US looks good. Discussed IVEF as not an issue. AFP today. Sarah OK.   RTO 8 weeks for glucola, schedule C/S

## 2020-04-20 NOTE — PATIENT INSTRUCTIONS

## 2020-04-20 NOTE — PROGRESS NOTES
A SINGLE VIABLE IUP AT 20W2D GA BY LMP. FETAL CARDIAC MOTION OBSERVED. FETAL ANATOMY WELL VISUALIZED. A LEFT IVEF IS SEEN. APPROPRIATE GROWTH MEASURED; SIZE = DATES. JOSE, CERVIX AND PLACENTA APPEAR WITHIN NORMAL LIMITS.   GENDER: XX

## 2020-04-23 LAB
AFP ADJ MOM SERPL: 1.47
AFP INTERP SERPL-IMP: NORMAL
AFP INTERP SERPL-IMP: NORMAL
AFP SERPL-MCNC: 96.8 NG/ML
AGE AT DELIVERY: 34.9 YR
COMMENT, 018013: NORMAL
GA METHOD: NORMAL
GA: 20.1 WEEKS
IDDM PATIENT QL: NO
MULTIPLE PREGNANCY: NO
NEURAL TUBE DEFECT RISK FETUS: 2965 %
RESULTS, 017004: NORMAL

## 2020-06-12 LAB — GTT, 1 HR, GLUCOLA, EXTERNAL: 113

## 2020-06-15 ENCOUNTER — ROUTINE PRENATAL (OUTPATIENT)
Dept: OBGYN CLINIC | Age: 35
End: 2020-06-15

## 2020-06-15 VITALS
HEIGHT: 63 IN | SYSTOLIC BLOOD PRESSURE: 122 MMHG | BODY MASS INDEX: 22.32 KG/M2 | WEIGHT: 126 LBS | DIASTOLIC BLOOD PRESSURE: 60 MMHG

## 2020-06-15 DIAGNOSIS — Z23 ENCOUNTER FOR IMMUNIZATION: ICD-10-CM

## 2020-06-15 DIAGNOSIS — Z34.83 ENCOUNTER FOR SUPERVISION OF OTHER NORMAL PREGNANCY, THIRD TRIMESTER: Primary | ICD-10-CM

## 2020-06-15 LAB
HCT, EXTERNAL: 33.9
HGB, EXTERNAL: 11.8
PLATELET CNT,   EXTERNAL: 308

## 2020-06-15 NOTE — PATIENT INSTRUCTIONS

## 2020-06-15 NOTE — PROGRESS NOTES
Administered 0.5mL TETANUS, DIPHTHERIA TOXOIDS AND ACELLULAR PERTUSSIS VACCINE (TDAP) per MD order. Patient consent signed by patient. Injection given IM in the right deltoid . Patient tolerated well, no complications, no side effects.     Lot # [id number]  Exp: 6/28/22

## 2020-06-16 LAB
ERYTHROCYTE [DISTWIDTH] IN BLOOD BY AUTOMATED COUNT: 12.6 % (ref 11.7–15.4)
GLUCOSE 1H P 50 G GLC PO SERPL-MCNC: 113 MG/DL (ref 65–139)
HCT VFR BLD AUTO: 33.9 % (ref 34–46.6)
HGB BLD-MCNC: 11.8 G/DL (ref 11.1–15.9)
MCH RBC QN AUTO: 32.8 PG (ref 26.6–33)
MCHC RBC AUTO-ENTMCNC: 34.8 G/DL (ref 31.5–35.7)
MCV RBC AUTO: 94 FL (ref 79–97)
PLATELET # BLD AUTO: 308 X10E3/UL (ref 150–450)
RBC # BLD AUTO: 3.6 X10E6/UL (ref 3.77–5.28)
WBC # BLD AUTO: 14.5 X10E3/UL (ref 3.4–10.8)

## 2020-07-13 ENCOUNTER — ROUTINE PRENATAL (OUTPATIENT)
Dept: OBGYN CLINIC | Age: 35
End: 2020-07-13

## 2020-07-13 VITALS
WEIGHT: 131 LBS | DIASTOLIC BLOOD PRESSURE: 60 MMHG | SYSTOLIC BLOOD PRESSURE: 124 MMHG | HEIGHT: 63 IN | BODY MASS INDEX: 23.21 KG/M2

## 2020-07-13 DIAGNOSIS — Z34.83 ENCOUNTER FOR SUPERVISION OF OTHER NORMAL PREGNANCY, THIRD TRIMESTER: Primary | ICD-10-CM

## 2020-07-13 NOTE — PATIENT INSTRUCTIONS
Weeks 32 to 34 of Your Pregnancy: Care Instructions Your Care Instructions During the last few weeks of your pregnancy, you may have more aches and pains. It's important to rest when you can. Your growing baby is putting more pressure on your bladder. So you may need to urinate more often. Hemorrhoids are also common. These are painful, itchy veins in the rectal area. In the 36th week, most women have a test for group B streptococcus (GBS). GBS is a common bacteria that can live in the vagina and rectum. It can make your baby sick after birth. If you test positive, you will get antibiotics during labor. These will keep your baby from getting the bacteria. You may want to talk with your doctor about banking your baby's umbilical cord blood. This is the blood left in the cord after birth. If you want to save this blood, you must arrange it ahead of time. You can't decide at the last minute. If you haven't already had the Tdap shot during this pregnancy, talk to your doctor about getting it. It will help protect your  against pertussis infection. Follow-up care is a key part of your treatment and safety. Be sure to make and go to all appointments, and call your doctor if you are having problems. It's also a good idea to know your test results and keep a list of the medicines you take. How can you care for yourself at home? Ease hemorrhoids · Get more liquids, fruits, vegetables, and fiber in your diet. This will help keep your stools soft. · Avoid sitting for too long. Lie on your left side several times a day. · Clean yourself with soft, moist toilet paper. Or you can use witch hazel pads or personal hygiene pads. · If you are uncomfortable, try ice packs. Or you can sit in a warm sitz bath. Do these for 20 minutes at a time, as needed. · Use hydrocortisone cream for pain and itching. Two examples are Anusol and Preparation H Hydrocortisone. · Ask your doctor about taking an over-the-counter stool softener. Consider breastfeeding · Experts recommend that women breastfeed for 1 year or longer. · Breast milk may help protect your child from some health problems.  babies are less likely than formula-fed babies to: 
? Get ear infections, colds, diarrhea, and pneumonia. ? Be obese or get diabetes later in life. · Women who breastfeed have less bleeding after the birth. Their uteruses also shrink back faster. · Some women who breastfeed lose weight faster. Making milk burns calories. · Breastfeeding can lower your risk of breast cancer, ovarian cancer, and osteoporosis. Decide about circumcision for boys · As you make this decision, it may help to think about your personal, Taoism, and family traditions. You get to decide if you will keep your son's penis natural or if he will be circumcised. · If you decide that you would like to have your baby circumcised, talk with your doctor. You can share your concerns about pain. And you can discuss your preferences for anesthesia. Where can you learn more? Go to http://www.gray.com/ Enter Y713 in the search box to learn more about \"Weeks 32 to 34 of Your Pregnancy: Care Instructions. \" Current as of: February 11, 2020               Content Version: 12.5 © 5371-3941 [employer]. Care instructions adapted under license by [employer] (which disclaims liability or warranty for this information). If you have questions about a medical condition or this instruction, always ask your healthcare professional. [place] [address] any warranty or liability for your use of this information.

## 2020-07-27 ENCOUNTER — ROUTINE PRENATAL (OUTPATIENT)
Dept: OBGYN CLINIC | Age: 35
End: 2020-07-27

## 2020-07-27 VITALS
SYSTOLIC BLOOD PRESSURE: 110 MMHG | DIASTOLIC BLOOD PRESSURE: 64 MMHG | WEIGHT: 130 LBS | BODY MASS INDEX: 23.04 KG/M2 | HEIGHT: 63 IN

## 2020-07-27 DIAGNOSIS — Z34.83 ENCOUNTER FOR SUPERVISION OF OTHER NORMAL PREGNANCY, THIRD TRIMESTER: Primary | ICD-10-CM

## 2020-07-27 NOTE — PATIENT INSTRUCTIONS

## 2020-07-30 ENCOUNTER — HOSPITAL ENCOUNTER (INPATIENT)
Age: 35
LOS: 2 days | Discharge: HOME | DRG: 540 | End: 2020-08-01
Attending: OBSTETRICS & GYNECOLOGY | Admitting: OBSTETRICS & GYNECOLOGY
Payer: MEDICAID

## 2020-07-30 ENCOUNTER — ANESTHESIA EVENT (OUTPATIENT)
Dept: LABOR AND DELIVERY | Age: 35
DRG: 540 | End: 2020-07-30
Payer: MEDICAID

## 2020-07-30 ENCOUNTER — ANESTHESIA (OUTPATIENT)
Dept: LABOR AND DELIVERY | Age: 35
DRG: 540 | End: 2020-07-30
Payer: MEDICAID

## 2020-07-30 PROBLEM — Z34.90 PREGNANCY: Status: ACTIVE | Noted: 2020-07-30

## 2020-07-30 LAB
ABO + RH BLD: NORMAL
AMPHET UR QL SCN: POSITIVE
BARBITURATES UR QL SCN: NEGATIVE
BASOPHILS # BLD: 0.1 K/UL (ref 0–0.1)
BASOPHILS NFR BLD: 0 % (ref 0–1)
BENZODIAZ UR QL: NEGATIVE
BLOOD GROUP ANTIBODIES SERPL: NORMAL
CANNABINOIDS UR QL SCN: NEGATIVE
COCAINE UR QL SCN: NEGATIVE
COVID-19 RAPID TEST, COVR: NOT DETECTED
DAILY QC (YES/NO)?: YES
DIFFERENTIAL METHOD BLD: ABNORMAL
DRUG SCRN COMMENT,DRGCM: ABNORMAL
EOSINOPHIL # BLD: 0.1 K/UL (ref 0–0.4)
EOSINOPHIL NFR BLD: 1 % (ref 0–7)
ERYTHROCYTE [DISTWIDTH] IN BLOOD BY AUTOMATED COUNT: 13.4 % (ref 11.5–14.5)
HCT VFR BLD AUTO: 35.3 % (ref 35–47)
HGB BLD-MCNC: 12.3 G/DL (ref 11.5–16)
IMM GRANULOCYTES # BLD AUTO: 0.2 K/UL (ref 0–0.04)
IMM GRANULOCYTES NFR BLD AUTO: 1 % (ref 0–0.5)
LYMPHOCYTES # BLD: 2.8 K/UL (ref 0.8–3.5)
LYMPHOCYTES NFR BLD: 23 % (ref 12–49)
MCH RBC QN AUTO: 33.2 PG (ref 26–34)
MCHC RBC AUTO-ENTMCNC: 34.8 G/DL (ref 30–36.5)
MCV RBC AUTO: 95.4 FL (ref 80–99)
METHADONE UR QL: NEGATIVE
MONOCYTES # BLD: 1.1 K/UL (ref 0–1)
MONOCYTES NFR BLD: 9 % (ref 5–13)
NEUTS SEG # BLD: 8.1 K/UL (ref 1.8–8)
NEUTS SEG NFR BLD: 66 % (ref 32–75)
NRBC # BLD: 0 K/UL (ref 0–0.01)
NRBC BLD-RTO: 0 PER 100 WBC
OPIATES UR QL: NEGATIVE
PCP UR QL: NEGATIVE
PH, VAGINAL FLUID: 7.5 (ref 5–6.1)
PLATELET # BLD AUTO: 265 K/UL (ref 150–400)
PMV BLD AUTO: 10.4 FL (ref 8.9–12.9)
RBC # BLD AUTO: 3.7 M/UL (ref 3.8–5.2)
SARS-COV-2, COV2: NOT DETECTED
SOURCE, COVRS: NORMAL
SPECIMEN EXP DATE BLD: NORMAL
SPECIMEN SOURCE, FCOV2M: NORMAL
SPECIMEN SOURCE, FCOV2M: NORMAL
WBC # BLD AUTO: 12.3 K/UL (ref 3.6–11)

## 2020-07-30 PROCEDURE — 87635 SARS-COV-2 COVID-19 AMP PRB: CPT

## 2020-07-30 PROCEDURE — 4A1HXCZ MONITORING OF PRODUCTS OF CONCEPTION, CARDIAC RATE, EXTERNAL APPROACH: ICD-10-PCS | Performed by: OBSTETRICS & GYNECOLOGY

## 2020-07-30 PROCEDURE — 77030007866 HC KT SPN ANES BBMI -B: Performed by: ANESTHESIOLOGY

## 2020-07-30 PROCEDURE — 76010000391 HC C SECN FIRST 1 HR: Performed by: OBSTETRICS & GYNECOLOGY

## 2020-07-30 PROCEDURE — 76060000078 HC EPIDURAL ANESTHESIA: Performed by: OBSTETRICS & GYNECOLOGY

## 2020-07-30 PROCEDURE — 74011000250 HC RX REV CODE- 250: Performed by: ANESTHESIOLOGY

## 2020-07-30 PROCEDURE — 74011250636 HC RX REV CODE- 250/636: Performed by: OBSTETRICS & GYNECOLOGY

## 2020-07-30 PROCEDURE — 83986 ASSAY PH BODY FLUID NOS: CPT | Performed by: OBSTETRICS & GYNECOLOGY

## 2020-07-30 PROCEDURE — 75410000003 HC RECOV DEL/VAG/CSECN EA 0.5 HR: Performed by: OBSTETRICS & GYNECOLOGY

## 2020-07-30 PROCEDURE — 74011250637 HC RX REV CODE- 250/637: Performed by: ANESTHESIOLOGY

## 2020-07-30 PROCEDURE — 85025 COMPLETE CBC W/AUTO DIFF WBC: CPT

## 2020-07-30 PROCEDURE — 74011250636 HC RX REV CODE- 250/636

## 2020-07-30 PROCEDURE — 74011250637 HC RX REV CODE- 250/637: Performed by: OBSTETRICS & GYNECOLOGY

## 2020-07-30 PROCEDURE — 36415 COLL VENOUS BLD VENIPUNCTURE: CPT

## 2020-07-30 PROCEDURE — 74011250636 HC RX REV CODE- 250/636: Performed by: ANESTHESIOLOGY

## 2020-07-30 PROCEDURE — 76010000392 HC C SECN EA ADDL 0.5 HR: Performed by: OBSTETRICS & GYNECOLOGY

## 2020-07-30 PROCEDURE — 88307 TISSUE EXAM BY PATHOLOGIST: CPT

## 2020-07-30 PROCEDURE — 74011636637 HC RX REV CODE- 636/637: Performed by: OBSTETRICS & GYNECOLOGY

## 2020-07-30 PROCEDURE — 99283 EMERGENCY DEPT VISIT LOW MDM: CPT

## 2020-07-30 PROCEDURE — 80307 DRUG TEST PRSMV CHEM ANLYZR: CPT

## 2020-07-30 PROCEDURE — 86900 BLOOD TYPING SEROLOGIC ABO: CPT

## 2020-07-30 PROCEDURE — 65270000029 HC RM PRIVATE

## 2020-07-30 RX ORDER — IBUPROFEN 800 MG/1
800 TABLET ORAL EVERY 8 HOURS
Status: DISCONTINUED | OUTPATIENT
Start: 2020-07-31 | End: 2020-07-30

## 2020-07-30 RX ORDER — KETOROLAC TROMETHAMINE 30 MG/ML
30 INJECTION, SOLUTION INTRAMUSCULAR; INTRAVENOUS
Status: DISCONTINUED | OUTPATIENT
Start: 2020-07-30 | End: 2020-07-30

## 2020-07-30 RX ORDER — OXYCODONE HYDROCHLORIDE 5 MG/1
5 TABLET ORAL
Status: DISCONTINUED | OUTPATIENT
Start: 2020-07-30 | End: 2020-07-31

## 2020-07-30 RX ORDER — NALOXONE HYDROCHLORIDE 0.4 MG/ML
0.4 INJECTION, SOLUTION INTRAMUSCULAR; INTRAVENOUS; SUBCUTANEOUS AS NEEDED
Status: DISCONTINUED | OUTPATIENT
Start: 2020-07-30 | End: 2020-08-01 | Stop reason: HOSPADM

## 2020-07-30 RX ORDER — BUPRENORPHINE 2 MG/1
3 TABLET SUBLINGUAL DAILY
Status: DISCONTINUED | OUTPATIENT
Start: 2020-07-31 | End: 2020-07-30

## 2020-07-30 RX ORDER — ZOLPIDEM TARTRATE 5 MG/1
5 TABLET ORAL
Status: DISCONTINUED | OUTPATIENT
Start: 2020-07-30 | End: 2020-08-01 | Stop reason: HOSPADM

## 2020-07-30 RX ORDER — FENTANYL CITRATE 50 UG/ML
INJECTION, SOLUTION INTRAMUSCULAR; INTRAVENOUS AS NEEDED
Status: DISCONTINUED | OUTPATIENT
Start: 2020-07-30 | End: 2020-07-30 | Stop reason: HOSPADM

## 2020-07-30 RX ORDER — BUPRENORPHINE 2 MG/1
8 TABLET SUBLINGUAL 3 TIMES DAILY
Status: DISCONTINUED | OUTPATIENT
Start: 2020-07-30 | End: 2020-07-30

## 2020-07-30 RX ORDER — MIDAZOLAM HYDROCHLORIDE 1 MG/ML
INJECTION, SOLUTION INTRAMUSCULAR; INTRAVENOUS AS NEEDED
Status: DISCONTINUED | OUTPATIENT
Start: 2020-07-30 | End: 2020-07-30 | Stop reason: HOSPADM

## 2020-07-30 RX ORDER — IBUPROFEN 800 MG/1
800 TABLET ORAL EVERY 8 HOURS
Status: DISCONTINUED | OUTPATIENT
Start: 2020-07-30 | End: 2020-08-01 | Stop reason: HOSPADM

## 2020-07-30 RX ORDER — OXYTOCIN 10 [USP'U]/ML
INJECTION, SOLUTION INTRAMUSCULAR; INTRAVENOUS AS NEEDED
Status: DISCONTINUED | OUTPATIENT
Start: 2020-07-30 | End: 2020-07-30 | Stop reason: HOSPADM

## 2020-07-30 RX ORDER — DIPHENHYDRAMINE HYDROCHLORIDE 50 MG/ML
12.5 INJECTION, SOLUTION INTRAMUSCULAR; INTRAVENOUS
Status: DISCONTINUED | OUTPATIENT
Start: 2020-07-30 | End: 2020-07-30

## 2020-07-30 RX ORDER — ADHESIVE BANDAGE
30 BANDAGE TOPICAL DAILY PRN
Status: DISCONTINUED | OUTPATIENT
Start: 2020-07-30 | End: 2020-08-01 | Stop reason: HOSPADM

## 2020-07-30 RX ORDER — BUPIVACAINE HYDROCHLORIDE 7.5 MG/ML
INJECTION, SOLUTION EPIDURAL; RETROBULBAR AS NEEDED
Status: DISCONTINUED | OUTPATIENT
Start: 2020-07-30 | End: 2020-07-30 | Stop reason: HOSPADM

## 2020-07-30 RX ORDER — CLINDAMYCIN PHOSPHATE 900 MG/50ML
900 INJECTION, SOLUTION INTRAVENOUS ONCE
Status: DISCONTINUED | OUTPATIENT
Start: 2020-07-30 | End: 2020-07-30

## 2020-07-30 RX ORDER — SODIUM CHLORIDE, SODIUM LACTATE, POTASSIUM CHLORIDE, CALCIUM CHLORIDE 600; 310; 30; 20 MG/100ML; MG/100ML; MG/100ML; MG/100ML
1000 INJECTION, SOLUTION INTRAVENOUS CONTINUOUS
Status: DISCONTINUED | OUTPATIENT
Start: 2020-07-30 | End: 2020-07-30

## 2020-07-30 RX ORDER — EPHEDRINE SULFATE/0.9% NACL/PF 50 MG/5 ML
SYRINGE (ML) INTRAVENOUS AS NEEDED
Status: DISCONTINUED | OUTPATIENT
Start: 2020-07-30 | End: 2020-07-30 | Stop reason: HOSPADM

## 2020-07-30 RX ORDER — SODIUM CHLORIDE 0.9 % (FLUSH) 0.9 %
5-40 SYRINGE (ML) INJECTION AS NEEDED
Status: DISCONTINUED | OUTPATIENT
Start: 2020-07-30 | End: 2020-07-30

## 2020-07-30 RX ORDER — ACETAMINOPHEN 325 MG/1
650 TABLET ORAL
Status: DISCONTINUED | OUTPATIENT
Start: 2020-07-30 | End: 2020-08-01 | Stop reason: HOSPADM

## 2020-07-30 RX ORDER — OXYCODONE HYDROCHLORIDE 5 MG/1
10 TABLET ORAL
Status: DISCONTINUED | OUTPATIENT
Start: 2020-07-30 | End: 2020-07-31

## 2020-07-30 RX ORDER — MORPHINE SULFATE 0.5 MG/ML
INJECTION, SOLUTION EPIDURAL; INTRATHECAL; INTRAVENOUS AS NEEDED
Status: DISCONTINUED | OUTPATIENT
Start: 2020-07-30 | End: 2020-07-30 | Stop reason: HOSPADM

## 2020-07-30 RX ORDER — DEXTROAMPHETAMINE SACCHARATE, AMPHETAMINE ASPARTATE, DEXTROAMPHETAMINE SULFATE AND AMPHETAMINE SULFATE 2.5; 2.5; 2.5; 2.5 MG/1; MG/1; MG/1; MG/1
30 TABLET ORAL 2 TIMES DAILY
Status: DISCONTINUED | OUTPATIENT
Start: 2020-07-30 | End: 2020-08-01 | Stop reason: HOSPADM

## 2020-07-30 RX ORDER — SIMETHICONE 80 MG
80 TABLET,CHEWABLE ORAL
Status: DISCONTINUED | OUTPATIENT
Start: 2020-07-30 | End: 2020-08-01 | Stop reason: HOSPADM

## 2020-07-30 RX ORDER — DIPHENHYDRAMINE HCL 25 MG
25 CAPSULE ORAL
Status: DISCONTINUED | OUTPATIENT
Start: 2020-07-30 | End: 2020-08-01 | Stop reason: HOSPADM

## 2020-07-30 RX ORDER — SODIUM CHLORIDE 0.9 % (FLUSH) 0.9 %
5-40 SYRINGE (ML) INJECTION EVERY 8 HOURS
Status: DISCONTINUED | OUTPATIENT
Start: 2020-07-30 | End: 2020-07-30

## 2020-07-30 RX ORDER — BUPRENORPHINE 2 MG/1
8 TABLET SUBLINGUAL 3 TIMES DAILY
Status: DISCONTINUED | OUTPATIENT
Start: 2020-07-30 | End: 2020-08-01 | Stop reason: HOSPADM

## 2020-07-30 RX ORDER — NALOXONE HYDROCHLORIDE 0.4 MG/ML
0.1 INJECTION, SOLUTION INTRAMUSCULAR; INTRAVENOUS; SUBCUTANEOUS
Status: DISCONTINUED | OUTPATIENT
Start: 2020-07-30 | End: 2020-08-01 | Stop reason: HOSPADM

## 2020-07-30 RX ORDER — BUPRENORPHINE 2 MG/1
3 TABLET SUBLINGUAL 3 TIMES DAILY
Status: DISCONTINUED | OUTPATIENT
Start: 2020-07-30 | End: 2020-07-30

## 2020-07-30 RX ORDER — IBUPROFEN 200 MG
1 TABLET ORAL DAILY
Status: DISCONTINUED | OUTPATIENT
Start: 2020-07-31 | End: 2020-07-31

## 2020-07-30 RX ORDER — ONDANSETRON 2 MG/ML
INJECTION INTRAMUSCULAR; INTRAVENOUS AS NEEDED
Status: DISCONTINUED | OUTPATIENT
Start: 2020-07-30 | End: 2020-07-30 | Stop reason: HOSPADM

## 2020-07-30 RX ORDER — SODIUM CHLORIDE, SODIUM LACTATE, POTASSIUM CHLORIDE, CALCIUM CHLORIDE 600; 310; 30; 20 MG/100ML; MG/100ML; MG/100ML; MG/100ML
125 INJECTION, SOLUTION INTRAVENOUS CONTINUOUS
Status: DISCONTINUED | OUTPATIENT
Start: 2020-07-30 | End: 2020-07-30

## 2020-07-30 RX ORDER — OXYTOCIN/0.9 % SODIUM CHLORIDE 20/1000 ML
125-500 PLASTIC BAG, INJECTION (ML) INTRAVENOUS ONCE
Status: DISCONTINUED | OUTPATIENT
Start: 2020-07-30 | End: 2020-07-30

## 2020-07-30 RX ADMIN — ACETAMINOPHEN 650 MG: 325 TABLET ORAL at 17:20

## 2020-07-30 RX ADMIN — OXYTOCIN 20 UNITS: 10 INJECTION, SOLUTION INTRAMUSCULAR; INTRAVENOUS at 13:17

## 2020-07-30 RX ADMIN — SODIUM CHLORIDE, SODIUM LACTATE, POTASSIUM CHLORIDE, AND CALCIUM CHLORIDE 1000 ML: 600; 310; 30; 20 INJECTION, SOLUTION INTRAVENOUS at 11:02

## 2020-07-30 RX ADMIN — CLINDAMYCIN PHOSPHATE 900 MG: 900 INJECTION, SOLUTION INTRAVENOUS at 11:45

## 2020-07-30 RX ADMIN — KETOROLAC TROMETHAMINE 30 MG: 30 INJECTION, SOLUTION INTRAMUSCULAR at 17:20

## 2020-07-30 RX ADMIN — FENTANYL CITRATE 10 MCG: 0.05 INJECTION, SOLUTION INTRAMUSCULAR; INTRAVENOUS at 12:29

## 2020-07-30 RX ADMIN — ONDANSETRON HYDROCHLORIDE 4 MG: 2 SOLUTION INTRAMUSCULAR; INTRAVENOUS at 12:22

## 2020-07-30 RX ADMIN — OXYCODONE 10 MG: 5 TABLET ORAL at 21:26

## 2020-07-30 RX ADMIN — BUPRENORPHINE HCL 8 MG: 2 TABLET SUBLINGUAL at 18:01

## 2020-07-30 RX ADMIN — BUPIVACAINE HYDROCHLORIDE 1.7 ML: 7.5 INJECTION, SOLUTION EPIDURAL; RETROBULBAR at 12:29

## 2020-07-30 RX ADMIN — SODIUM CHLORIDE, SODIUM LACTATE, POTASSIUM CHLORIDE, AND CALCIUM CHLORIDE 1000 ML: 600; 310; 30; 20 INJECTION, SOLUTION INTRAVENOUS at 11:35

## 2020-07-30 RX ADMIN — BUPRENORPHINE HCL 8 MG: 2 TABLET SUBLINGUAL at 23:46

## 2020-07-30 RX ADMIN — SODIUM CHLORIDE, SODIUM LACTATE, POTASSIUM CHLORIDE, AND CALCIUM CHLORIDE: 600; 310; 30; 20 INJECTION, SOLUTION INTRAVENOUS at 12:43

## 2020-07-30 RX ADMIN — IBUPROFEN 800 MG: 800 TABLET ORAL at 23:39

## 2020-07-30 RX ADMIN — OXYTOCIN 40 UNITS: 10 INJECTION, SOLUTION INTRAMUSCULAR; INTRAVENOUS at 12:51

## 2020-07-30 RX ADMIN — Medication 10 MG: at 12:30

## 2020-07-30 RX ADMIN — ZOLPIDEM TARTRATE 5 MG: 5 TABLET ORAL at 21:26

## 2020-07-30 RX ADMIN — MORPHINE SULFATE 200 MCG: 0.5 INJECTION, SOLUTION EPIDURAL; INTRATHECAL; INTRAVENOUS at 12:29

## 2020-07-30 RX ADMIN — MIDAZOLAM HYDROCHLORIDE 2 MG: 2 INJECTION, SOLUTION INTRAMUSCULAR; INTRAVENOUS at 12:54

## 2020-07-30 RX ADMIN — FAMOTIDINE 20 MG: 10 INJECTION, SOLUTION INTRAVENOUS at 12:23

## 2020-07-30 RX ADMIN — DEXTROAMPHETAMINE SACCHARATE, AMPHETAMINE ASPARTATE, DEXTROAMPHETAMINE SULFATE AND AMPHETAMINE SULFATE 30 MG: 2.5; 2.5; 2.5; 2.5 TABLET ORAL at 17:20

## 2020-07-30 NOTE — PROGRESS NOTES
1045 - Pt arrived on L&D at this time with c/o SROM at 0900. Pt changed into hospital gown then repositioned self in hospital bed and EFM x 2 applied. 1055 - Nitrazine done at this time and positive; 7.5 pH. Dr. Papito Ansari and NICU team notified. Pt has had three prior c/s so will be repeat c/s. Pt to be admitted to L&D at this time. VSS taken; pt denies pain, denies needs. 1102 - Peripheral IV placed by KATHY Garcia RN and LR bolus started to prep for c/s.     1112 - Dr. Papito Ansari in room to see pt; Jaquelin Mai for Clindamycin 800 mg IV for surgical prophylaxis and UDS to be sent due to pt on Subutex and history of Opiate addiction. 1127 - UDS and labs sent for processing per MD orders. [address] handoff at bedside to Aldean Read, [place]. Relinquished care of pt.   2952 - Pt having COVID testing performed by MILO Vasquez RN at this time, per MD orders.

## 2020-07-30 NOTE — LACTATION NOTE
This note was copied from a baby's chart. Mother requested to see lactation services. She is undecided about how she will feed her baby. She currently is on Adderall IR 30 mg BID and Subutex 8 mg TID and is a smoker. Her baby is in NICU born at 34.4 weeks. Adderall is an L3 and Subutex is an L2 in Dr. Tierney Arriola -  Medications in Mother's Milk. Literature on medications given to mother. She formula fed her older children and is considering pumping for her new baby. She does not have a breast pump for home use. LC discussed that she will need to pump Q 2-3 hr for 20 minutes for baby. Mother was sleepy at time of visit.  LC suggested mother rest and discuss medications with baby's neonatologist.

## 2020-07-30 NOTE — PROGRESS NOTES
11:36 - Received report from prior RN.     11:45 - Clinda administered     12:08 Rapid Covid negative per lab, called MD Saurabh Diggs

## 2020-07-30 NOTE — OP NOTES
Section Delivery Procedure Note         Name: Zoie Guallpa      Medical Record Number: [id number]      YOB: 1985     Today's Date: 2020      Preoperative Diagnosis: repeat c/s    Postoperative Diagnosis: same    Procedure: Low Cervical Transverse Procedure(s):   SECTION    Surgeon:  Michelle Rhoades MD     Assistant:  Staff    Anesthesia: Spinal    Prophylactic Antibiotics: clindamycin       Fetal Description: balderrama female    Birth Information:   Information for the patient's :  Carlie Boxer [[id number]]          Umbilical Cord: normal    Placenta:  manual    Specimens: * No specimens in log *     Implants:  None           Complications:  none    EBL: see QBL    Procedure Details: The patient was prepped with alcohol and draped with ioban in the supine position with a spinal  anesthetic. Brooks catheter had been placed using sterile technique. A Pfannenstiel incision was made, excising her old skin scar. The fascia was divided and then the rectus muscles were split in the midline. The peritoneum was entered well above the bladder without difficulty. The peritoneum over the lower uterine segment was incised and the bladder flap was developed. The bladder retractor was placed. The lower uterine segment was entered sharply with a single edge of the Metzenbaum scissors. The lower uterine segment was very thin on the right. Amniotomy was performed, the fluid was small amount clear. The infant was then delivered to chest level and the mouth and nares were suctioned. The remainder of the infant was delivered. The cord was clamped and cut and the infant handed off to the nursery staff. The placenta was delivered manually; it was normal and intact. It was sent to pathology. The uterus was cleared of blood, fluid, clot, and membranes and involuted with IV Pitocin given by anesthesia.     The uterus was exteriorized and closed in a single, running interlocking layer of 1 Chromic. It was hemostatic. The posterior cul-de-sac was cleared of blood and fluid, then the uterus was replaced in the abdominal cavity. The gutters were irrigated with warm saline. The anterior peritoneum and rectus muscles were reapproximated in the midline with a suture of 1 chromic. The fascia was closed from left to right with 1 Stratafix in a running fashion. The subcutaneous space was irrigated after freeing up scar and adhesions and then the skin was closed with stainless steel staples. The final sponge and instrument counts were correct. The patient and infant were taken to the recovery room in good condition.     Signed By:  Kirt Conde MD     July 30, 2020

## 2020-07-30 NOTE — L&D DELIVERY NOTE
Delivery Summary    Patient: Trev Saucedo MRN: [id number]  SSN: [id number]    YOB: 1985  Age: 29 y.o. Sex: female        Information for the patient's :  Karl Carty [[id number]]       Labor Events:    Labor: Yes    Steroids: None   Cervical Ripening Date/Time:       Cervical Ripening Type: None   Antibiotics During Labor: No   Rupture Identifier:      Rupture Date/Time: 2020 9:00 AM   Rupture Type: SROM   Amniotic Fluid Volume: Moderate    Amniotic Fluid Description: Clear    Amniotic Fluid Odor: None    Induction: None       Induction Date/Time:        Indications for Induction:      Augmentation: None   Augmentation Date/Time:      Indications for Augmentation:     Labor complications:  Other (comment)   PPROM   Additional complications:  premature rupture of membranes (PPROM) with unknown onset of labor      Delivery Events:  Indications For Episiotomy:     Episiotomy:     Perineal Laceration(s):     Repaired:     Periurethral Laceration Location:      Repaired:     Labial Laceration Location:     Repaired:     Sulcal Laceration Location:     Repaired:     Vaginal Laceration Location:     Repaired:     Cervical Laceration Location:     Repaired:     Repair Suture:     Number of Repair Packets:     Estimated Blood Loss (ml):     Quantitative Blood Loss (ml):        pending     Delivery Date: 2020    Delivery Time: 12:50 PM   Delivery Type: , Low Vertical     Details    Trial of Labor:     Primary/Repeat: Repeat   Priority: Routine   Indications:  Prior Uterine Surgery       Sex:  Female     Gestational Age: 34w4d  Delivery Clinician:  Janelle Ortiz  Living Status: Living   Delivery Location: OR            APGARS  One minute Five minutes Ten minutes   Skin color: 0   1        Heart rate: 2   2        Grimace: 2   2        Muscle tone: 2   2        Breathin   2        Totals: 8   9          Presentation: Vertex    Position: Resuscitation Method:  Suctioning-bulb     Meconium Stained: None      Cord Information: 2 Vessels  Complications: None  Cord around:    Delayed cord clamping? Yes  Cord clamped date/time:2020 12:51 PM  Disposition of Cord Blood:      Blood Gases Sent?: No    Placenta:  Date/Time: 2020 12:51 PM  Removal: Expressed      Appearance: Normal sent to pathology       Measurements:  Birth Weight: 4 lb 10.1 oz (2.1 kg)      Birth Length: 1' 5\" (0.432 m)      Head Circumference: 1' 0.6\" (0.32 m)      Chest Circumference: 11.42\" (0.29 m)     Abdominal Girth: 10.24\" (0.26 m)    Other Providers:   Brionna MARTINEZ;BURTON GALEANO;;;;;, Obstetrician;Primary Nurse;Primary  Nurse;Nicu Nurse;Neonatologist;Anesthesiologist;Crna             Group B Strep:   Lab Results   Component Value Date/Time    GrBStrep, External Negative 2018     Information for the patient's :  Susie Bueno [[id number]]   No results found for: ABORH, PCTABR, PCTDIG, BILI, ABORHEXT, ABORH     No results for input(s): PCO2CB, PO2CB, HCO3I, SO2I, IBD, PTEMPI, SPECTI, PHICB, ISITE, IDEV, IALLEN in the last 72 hours.

## 2020-07-30 NOTE — PROGRESS NOTES
1823-Pt's significant other came to nurses station stating he was going home and that pt was about to get up by herself because she wanted to go smoke. Writer going to bedside to check on pt.  1824-Writer at bedside, pt sitting on edge of bed. Pt asked where her significant other was and writer told her he had left to go home. Pt started crying uncontrollably and states he was suppose to come back with a wheel chair to take her outside to smoke. Writer instructed pt that she should not be getting up by herself yet, that we don't want her to fall. Pt said her nurse had already helped her to get up once. Writer instructed pt that we like to be with them at least the first two times they get up. Pt still crying, states she just wants to go outside to smoke. Writer asked pt if she would like a nicotine patch, and pt agrees that might help. Pt's dinner tray was in room. Writer helped pt back to bed and set up her dinner tray for her. Pt still visibly upset, but eating her dinner. Writer instructed pt to not get out of bed on her own. Writer instructed pt that primary nurse would be notified of her concerns and writer would put in an order for her nicotine patch. Pt verbalized under standing and has no further questions or concerns at this time. 1828-Primary RN notified and nicotine order placed by Chino Ruiz.

## 2020-07-30 NOTE — H&P
History & Physical    Name: Funmilayo Cantu MRN: [id number]  SSN: [id number]    YOB: 1985  Age: 29 y.o. Sex: female        Subjective:     Estimated Date of Delivery: 20  OB History        7    Para   4    Term   3       1    AB   2    Living   1       SAB        TAB   2    Ectopic        Molar        Multiple   0    Live Births   1                Ms. Skipper Dance is admitted with pregnancy at 34w4d for repeat  section. Prenatal course was complicated by smoking and previous C/S X 3, and now PPROM at 34w4d. Please see prenatal records for details. Past Medical History:   Diagnosis Date    Abnormal Pap smear 11    LGSIL + HPV    Abnormal Papanicolaou smear of cervix     ADHD (attention deficit hyperactivity disorder)     Asthma     Atypical squamous cells of undetermined significance (ASCUS) on Papanicolaou smear of cervix 2020    Cervical high risk HPV (human papillomavirus) test positive 2020,2018    HPV test positive 11    Low grade squamous intraepithelial lesion (LGSIL) on Papanicolaou smear of cervix 2018    Postpartum depression     Psychiatric problem     drug addiction- opiates, initial dependency 5 years ago. .methadone [place]    Trichomonas infection 2014     Past Surgical History:   Procedure Laterality Date     DELIVERY ONLY      HX COLPOSCOPY  2020    no bx due to pregnancy    HX DILATION AND CURETTAGE      HX OTHER SURGICAL      tonsils    HX OTHER SURGICAL      wisdom teeth extraction     Social History     Occupational History    Not on file   Tobacco Use    Smoking status: Current Every Day Smoker     Packs/day: 1.00     Years: 15.00     Pack years: 15.00    Smokeless tobacco: Never Used   Substance and Sexual Activity    Alcohol use: No    Drug use: Yes     Types:  Other     Comment: subutex    Sexual activity: Yes     Partners: Male     Birth control/protection: None     Comment: methadone     Family History   Problem Relation Age of Onset    Drug Abuse Mother     Drug Abuse Father        Allergies   Allergen Reactions    Cephalosporins Hives    Codeine Hives    Pcn [Penicillins] Hives     Prior to Admission medications    Medication Sig Start Date End Date Taking? Authorizing Provider   albuterol (PROVENTIL HFA, VENTOLIN HFA, PROAIR HFA) 90 mcg/actuation inhaler Take 1-2 Puffs by inhalation every four (4) hours as needed for Wheezing. 5/27/19  Yes Tootie Underwood NP   buprenorphine HCl (SUBUTEX) 8 mg sublingual tablet by SubLINGual route daily. Take 2 and 1/2 by mouth daily   Yes Provider, Historical   PNV No12-Iron-FA-DSS-OM-3 29 mg iron-1 mg -50 mg CPKD Take  by mouth. Yes Provider, Historical   amphetamine-dextroamphetamine (ADDERALL) 20 mg tablet Take 30 mg by mouth two (2) times a day. Indications: attention deficit disorder with hyperactivity   Yes Provider, Historical   methylPREDNISolone (MEDROL, WENDY,) 4 mg tablet Take as directed 5/27/19   Tootie Underwood NP   norgestimate-ethinyl estradiol ([facility] [place] [address],) 0.25-35 mg-mcg tab Take 1 Tab by mouth daily. 11/1/18   Josué Juarez MD   acetaminophen (TYLENOL) 325 mg tablet Take 2 Tabs by mouth every four (4) hours as needed. 9/17/18   Josué Juarez MD   ibuprofen (MOTRIN) 800 mg tablet Take 1 Tab by mouth every eight (8) hours as needed for Pain. 9/17/18   Josué Juarez MD   Dextromethorphan-guaiFENesin [facility] DM) 60-1,200 mg TM12 Take 1 Tab by mouth every twelve (12) hours as needed (congestion). 9/27/15   Flasschoen, Dickie Och, PA        Review of Systems: A comprehensive review of systems was negative except for that written in the HPI.     Objective:     Vitals:  Vitals:    07/30/20 1057 07/30/20 1104   Temp: 98.2 °F (36.8 °C)    Weight:  131 lb 2.8 oz (59.5 kg)   Height:  5' 3\" (1.6 m)        Physical Exam:  Heart: Regular rate and rhythm  Lung: clear to auscultation throughout lung fields, no wheezes, no rales, no rhonchi and normal respiratory effort  Abdomen: soft, nontender  Fundus: soft and non tender  Perineum: blood absent, amniotic fluid present  Lower Extremities:  - Edema No  Membranes:   Premature Rupture of Membranes; Amniotic Fluid: clear fluid  Fetal Heart Rate: Reactive    Prenatal Labs:   Lab Results   Component Value Date/Time    Rubella, External 2.73-Immune 2020    GrBStrep, External Negative 2018    HBsAg, External Negative 2020    HIV, External Negative 2020    RPR, External NR 2011    Gonorrhea, External Negative 2020    Chlamydia, External Negative 2020        Assessment/Plan:     Plan: Admit for repeat  Section. Informed consent was obtained and patient stated she had no further questions. Group B Strep was not tested. Rx Clindamycin for PCN and cephalosporin allergies.     Signed By:  Gumaro Argueta MD     2020

## 2020-07-30 NOTE — PROGRESS NOTES
7:19 PM  SBAR report received from Maia Fry RN. Assumed care of the patient at this time. 8:07 PM  Assisted the patient to the restroom to clean her up. Patient very insistent that she wants to go outside to smoke. I explained to her that in order to go outside, she would have to have her IV removed. Rodrigo Gomez RN in to help with assisting the patient to the bathroom. While in the room, Aishwarya Yeung and I addressed with the patient that someone had called stating the patient had her own Subutex and Adderall with her and was planning to take more on top of what we were giving her. She denied it. We informed her that her medications could not be left in the room with her and that we would need to send them with security. She refused saying she would send them with her friend. 8:10 PM  Security in to see the patient and take her meds from her and she continued to refuse. Her friend is present at this time and she gave all of her meds to him. She is still insisting she wants to go outside to smoke and I advised her that she really shouldn't and that taking her IV out means no Toradol. I also informed her that she isn't supposed to smoke on the hospital grounds. She still said she was going so her IV was removed. 8:19 PM  Patient off the floor via wheelchair with her friend. Her friend also has her bag of meds with him. 8:40 PM  Patient back on the unit with her friend. Her friend also still has her meds with him. He was stopped and informed he needed to take the meds back out with him. He stated he was planning to. He took the patient to the room and then left with a bag of medications. 9:24 PM  Patient refusing subutex at this time. Requesting pain and sleep medication instead. 11:40 PM  In to check vitals, fundus and give Motrin. Patient slumped over in the bed and difficult to wake. She woke enough to state she needed her Subutex for pain. She then fell directly back to sleep.    11:48 PM  Subutex given to the patient at this time. 3:26 AM  Patient medicated for pain. 5:05 AM  Attempted to draw the patients blood unsuccesfully. Cecilia d/c'ed. 6:45 AM  Patient requesting her meds early stating she takes them at 0700 every day. I informed her that due to the scheduling, it may catch up to her that she will not be able to get anything when she wants it. She said she was aware. She is also insisting on walking to the cafeteria. I informed her that she needed to order her food and not go to the cafeteria. She stated she was going anyway. 7:07 AM  SBAR report given to Liane Peck RN. Care of the patient turned over at this time.

## 2020-07-30 NOTE — ANESTHESIA POSTPROCEDURE EVALUATION
Procedure(s):   SECTION. spinal    Anesthesia Post Evaluation      Multimodal analgesia: multimodal analgesia used between 6 hours prior to anesthesia start to PACU discharge  Patient location during evaluation: PACU  Patient participation: complete - patient participated  Level of consciousness: awake and alert  Airway patency: patent  Anesthetic complications: no  Cardiovascular status: acceptable  Respiratory status: acceptable  Hydration status: acceptable        INITIAL Post-op Vital signs:   Vitals Value Taken Time   BP 93/11 2020  2:55 PM   Temp     Pulse 83 2020  2:55 PM   Resp     SpO2 98 % 2020  2:56 PM   Vitals shown include unvalidated device data.

## 2020-07-30 NOTE — PROGRESS NOTES
11:36- Received report from prior RN. 1145 - Clinda administered  12:08 Rapid Covid negative, called MD Pro Adames and MD Ernst   1330 QBL in  mL  1430  Confirmed with MD Jovanni Chiu pt is to continue on her home meds/dosing of subutex and adderall. Pt offered order for nicotine patch, states she does not want it. 1500 Discussed with Charge RN that pt has home meds at bedside. 1500 Discussed with patient, pharmacy, NICU and lactation current pumping plans. 1515 Pt tolerating clear liquids well    1536 Lactation at bedside with pt. Pt currently wanting time to think about pumping/breastfeeding. 36- Pt assisted via wheelchair to NICU. Fluids discontinued. 1800- Pt crying as partner is saying he is going to go home, pt requested RN leave room. 1840-Offered nicotine patch to patient - she currently isn't sure if she wants it. Explained that she needs to be resting at this time and can get oob to wheelchair with assistance to nicu. Pt wanting antunez out, explained that she needs to wait at this time. Pt tolerating PO regular diet well.

## 2020-07-30 NOTE — ANESTHESIA PREPROCEDURE EVALUATION
Relevant Problems   No relevant active problems       Anesthetic History   No history of anesthetic complications            Review of Systems / Medical History  Patient summary reviewed, nursing notes reviewed and pertinent labs reviewed    Pulmonary  Within defined limits        Smoker  Asthma        Neuro/Psych   Within defined limits      Psychiatric history     Cardiovascular  Within defined limits                  Comments: Not on Beta Blocker   GI/Hepatic/Renal  Within defined limits              Endo/Other  Within defined limits           Other Findings   Comments: Std  Drugs  subutex           Physical Exam    Airway  Mallampati: II  TM Distance: 4 - 6 cm  Neck ROM: normal range of motion   Mouth opening: Normal     Cardiovascular  Regular rate and rhythm,  S1 and S2 normal,  no murmur, click, rub, or gallop  Rhythm: regular  Rate: normal         Dental  No notable dental hx       Pulmonary  Breath sounds clear to auscultation               Abdominal  GI exam deferred       Other Findings            Anesthetic Plan    ASA: 2  Anesthesia type: spinal      Post-op pain plan if not by surgeon: intrathecal opiates      Anesthetic plan and risks discussed with: Patient

## 2020-07-30 NOTE — PROGRESS NOTES
1101: Dr. Aleksandra Bowling informed of pt arrival and SROM this am at 0900; MD wants to proceed with a c/s at 1200    1103: DR. Scotty Patterson informed of pt and md wanting to have a section at 1200

## 2020-07-31 LAB
BASOPHILS # BLD: 0 K/UL (ref 0–0.1)
BASOPHILS NFR BLD: 0 % (ref 0–1)
DIFFERENTIAL METHOD BLD: ABNORMAL
EOSINOPHIL # BLD: 0.1 K/UL (ref 0–0.4)
EOSINOPHIL NFR BLD: 1 % (ref 0–7)
ERYTHROCYTE [DISTWIDTH] IN BLOOD BY AUTOMATED COUNT: 13.5 % (ref 11.5–14.5)
HCT VFR BLD AUTO: 34.4 % (ref 35–47)
HGB BLD-MCNC: 11.8 G/DL (ref 11.5–16)
IMM GRANULOCYTES # BLD AUTO: 0.1 K/UL (ref 0–0.04)
IMM GRANULOCYTES NFR BLD AUTO: 1 % (ref 0–0.5)
LYMPHOCYTES # BLD: 1.2 K/UL (ref 0.8–3.5)
LYMPHOCYTES NFR BLD: 9 % (ref 12–49)
MCH RBC QN AUTO: 33.1 PG (ref 26–34)
MCHC RBC AUTO-ENTMCNC: 34.3 G/DL (ref 30–36.5)
MCV RBC AUTO: 96.4 FL (ref 80–99)
MONOCYTES # BLD: 0.6 K/UL (ref 0–1)
MONOCYTES NFR BLD: 5 % (ref 5–13)
NEUTS SEG # BLD: 10.9 K/UL (ref 1.8–8)
NEUTS SEG NFR BLD: 84 % (ref 32–75)
NRBC # BLD: 0 K/UL (ref 0–0.01)
NRBC BLD-RTO: 0 PER 100 WBC
PLATELET # BLD AUTO: 222 K/UL (ref 150–400)
PMV BLD AUTO: 10.3 FL (ref 8.9–12.9)
RBC # BLD AUTO: 3.57 M/UL (ref 3.8–5.2)
WBC # BLD AUTO: 12.9 K/UL (ref 3.6–11)

## 2020-07-31 PROCEDURE — 74011250637 HC RX REV CODE- 250/637: Performed by: ANESTHESIOLOGY

## 2020-07-31 PROCEDURE — 74011250637 HC RX REV CODE- 250/637: Performed by: OBSTETRICS & GYNECOLOGY

## 2020-07-31 PROCEDURE — 85025 COMPLETE CBC W/AUTO DIFF WBC: CPT

## 2020-07-31 PROCEDURE — 65270000029 HC RM PRIVATE

## 2020-07-31 PROCEDURE — 36415 COLL VENOUS BLD VENIPUNCTURE: CPT

## 2020-07-31 PROCEDURE — 74011636637 HC RX REV CODE- 636/637: Performed by: OBSTETRICS & GYNECOLOGY

## 2020-07-31 RX ORDER — OXYCODONE HYDROCHLORIDE 5 MG/1
10 TABLET ORAL
Status: DISCONTINUED | OUTPATIENT
Start: 2020-07-31 | End: 2020-08-01 | Stop reason: HOSPADM

## 2020-07-31 RX ADMIN — BUPRENORPHINE HCL 8 MG: 2 TABLET SUBLINGUAL at 06:48

## 2020-07-31 RX ADMIN — BUPRENORPHINE HCL 8 MG: 2 TABLET SUBLINGUAL at 18:39

## 2020-07-31 RX ADMIN — ZOLPIDEM TARTRATE 5 MG: 5 TABLET ORAL at 22:37

## 2020-07-31 RX ADMIN — DEXTROAMPHETAMINE SACCHARATE, AMPHETAMINE ASPARTATE, DEXTROAMPHETAMINE SULFATE AND AMPHETAMINE SULFATE 30 MG: 2.5; 2.5; 2.5; 2.5 TABLET ORAL at 17:10

## 2020-07-31 RX ADMIN — BUPRENORPHINE HCL 8 MG: 2 TABLET SUBLINGUAL at 13:07

## 2020-07-31 RX ADMIN — IBUPROFEN 800 MG: 800 TABLET ORAL at 06:47

## 2020-07-31 RX ADMIN — IBUPROFEN 800 MG: 800 TABLET ORAL at 12:02

## 2020-07-31 RX ADMIN — DEXTROAMPHETAMINE SACCHARATE, AMPHETAMINE ASPARTATE, DEXTROAMPHETAMINE SULFATE AND AMPHETAMINE SULFATE 30 MG: 2.5; 2.5; 2.5; 2.5 TABLET ORAL at 06:47

## 2020-07-31 RX ADMIN — OXYCODONE 10 MG: 5 TABLET ORAL at 22:37

## 2020-07-31 RX ADMIN — OXYCODONE 10 MG: 5 TABLET ORAL at 14:41

## 2020-07-31 RX ADMIN — OXYCODONE 10 MG: 5 TABLET ORAL at 03:25

## 2020-07-31 RX ADMIN — IBUPROFEN 800 MG: 800 TABLET ORAL at 17:10

## 2020-07-31 RX ADMIN — OXYCODONE 10 MG: 5 TABLET ORAL at 09:34

## 2020-07-31 RX ADMIN — DEXTROAMPHETAMINE SACCHARATE, AMPHETAMINE ASPARTATE, DEXTROAMPHETAMINE SULFATE AND AMPHETAMINE SULFATE 30 MG: 2.5; 2.5; 2.5; 2.5 TABLET ORAL at 11:59

## 2020-07-31 NOTE — PROGRESS NOTES
Post-Operative Day 1 Progress Note    Patient now post-op day 1 following  delivery. Patient not available to see at the moment. Pain reportedly controlled on medication. Catheter out, normal lochia per nursing. Vitals:    Patient Vitals for the past 8 hrs:   BP Pulse   20 0327 118/67 80     Temp (24hrs), Av °F (36.1 °C), Min:94.5 °F (34.7 °C), Max:98.2 °F (36.8 °C)      Vital signs stable, afebrile. Exam:  Not seen    Labs:   Recent Results (from the past 24 hour(s))   PH BY NITRAZINE, POC    Collection Time: 20 10:55 AM   Result Value Ref Range    pH-Nitrazine paper 7.5 (A) 5.0 - 6.1    Daily QC performed? Yes    SARS-COV-2    Collection Time: 20 11:25 AM   Result Value Ref Range    Specimen source Nasopharyngeal      Specimen source Nasopharyngeal      COVID-19 rapid test Not detected NOTD     CBC WITH AUTOMATED DIFF    Collection Time: 20 11:25 AM   Result Value Ref Range    WBC 12.3 (H) 3.6 - 11.0 K/uL    RBC 3.70 (L) 3.80 - 5.20 M/uL    HGB 12.3 11.5 - 16.0 g/dL    HCT 35.3 35.0 - 47.0 %    MCV 95.4 80.0 - 99.0 FL    MCH 33.2 26.0 - 34.0 PG    MCHC 34.8 30.0 - 36.5 g/dL    RDW 13.4 11.5 - 14.5 %    PLATELET 462 071 - 898 K/uL    MPV 10.4 8.9 - 12.9 FL    NRBC 0.0 0  WBC    ABSOLUTE NRBC 0.00 0.00 - 0.01 K/uL    NEUTROPHILS 66 32 - 75 %    LYMPHOCYTES 23 12 - 49 %    MONOCYTES 9 5 - 13 %    EOSINOPHILS 1 0 - 7 %    BASOPHILS 0 0 - 1 %    IMMATURE GRANULOCYTES 1 (H) 0.0 - 0.5 %    ABS. NEUTROPHILS 8.1 (H) 1.8 - 8.0 K/UL    ABS. LYMPHOCYTES 2.8 0.8 - 3.5 K/UL    ABS. MONOCYTES 1.1 (H) 0.0 - 1.0 K/UL    ABS. EOSINOPHILS 0.1 0.0 - 0.4 K/UL    ABS. BASOPHILS 0.1 0.0 - 0.1 K/UL    ABS. IMM.  GRANS. 0.2 (H) 0.00 - 0.04 K/UL    DF AUTOMATED     TYPE & SCREEN    Collection Time: 20 11:25 AM   Result Value Ref Range    Crossmatch Expiration 2020     ABO/Rh(D) A POSITIVE     Antibody screen NEG    DRUG SCREEN, URINE    Collection Time: 20 11:25 AM   Result Value Ref Range    AMPHETAMINES Positive (A) NEG      BARBITURATES Negative NEG      BENZODIAZEPINES Negative NEG      COCAINE Negative NEG      METHADONE Negative NEG      OPIATES Negative NEG      PCP(PHENCYCLIDINE) Negative NEG      THC (TH-CANNABINOL) Negative NEG      Drug screen comment (NOTE)    SARS-COV-2, PCR    Collection Time: 20 11:25 AM    Specimen: Nasopharyngeal   Result Value Ref Range    Specimen source Nasopharyngeal      SARS-CoV-2 Not detected NOTD     CBC WITH AUTOMATED DIFF    Collection Time: 20  4:55 AM   Result Value Ref Range    WBC 12.9 (H) 3.6 - 11.0 K/uL    RBC 3.57 (L) 3.80 - 5.20 M/uL    HGB 11.8 11.5 - 16.0 g/dL    HCT 34.4 (L) 35.0 - 47.0 %    MCV 96.4 80.0 - 99.0 FL    MCH 33.1 26.0 - 34.0 PG    MCHC 34.3 30.0 - 36.5 g/dL    RDW 13.5 11.5 - 14.5 %    PLATELET 544 705 - 236 K/uL    MPV 10.3 8.9 - 12.9 FL    NRBC 0.0 0  WBC    ABSOLUTE NRBC 0.00 0.00 - 0.01 K/uL    NEUTROPHILS 84 (H) 32 - 75 %    LYMPHOCYTES 9 (L) 12 - 49 %    MONOCYTES 5 5 - 13 %    EOSINOPHILS 1 0 - 7 %    BASOPHILS 0 0 - 1 %    IMMATURE GRANULOCYTES 1 (H) 0.0 - 0.5 %    ABS. NEUTROPHILS 10.9 (H) 1.8 - 8.0 K/UL    ABS. LYMPHOCYTES 1.2 0.8 - 3.5 K/UL    ABS. MONOCYTES 0.6 0.0 - 1.0 K/UL    ABS. EOSINOPHILS 0.1 0.0 - 0.4 K/UL    ABS. BASOPHILS 0.0 0.0 - 0.1 K/UL    ABS. IMM. GRANS. 0.1 (H) 0.00 - 0.04 K/UL    DF AUTOMATED         Assessment and Plan:  Patient reportedly doing well post- day 1. She was advised she should not leave the unit. Pt ignored this and left. It is unknown where she is at this time. She had a security incident last night when she was found to have her own subutex which is against policy. She ultimately gave her medication to her friend to resolve the issue. I will check later to see if the patient has returned. Continue routine post-op care and maternal education.

## 2020-07-31 NOTE — LACTATION NOTE
This note was copied from a baby's chart. 1923 [facility] re-visited mother. She was reviewing medication information given to her yesterday from [facility] - Medications and Mother's Milk. Mother states she has not spoken to Neonatologist about there medications and she has not made a decision yet about pumping breast milk for her baby. Instructed mother to call lactation services if she decides to pump.

## 2020-07-31 NOTE — DISCHARGE SUMMARY
Obstetrical Discharge Summary     Name: Jennifer Wade MRN: [id number]  SSN: [id number]    YOB: 1985  Age: 29 y.o. Sex: female      Admit Date: 2020    Discharge Date: 2020     Admitting Physician: Mahsa Guerra MD     Attending Physician:  Edgar Soliman MD     Admission Diagnoses: Pregnancy [Z34.90]    Discharge Diagnoses:   Information for the patient's :  Jennyfer Foster [[id number]]   Delivery of a 4 lb 10.1 oz (2.1 kg) female infant via , Low Vertical on 2020 at 12:50 PM  by Lesa Richards. Apgars were 8  and 9 . Additional Diagnoses:   Hospital Problems  Date Reviewed: 2018          Codes Class Noted POA    Pregnancy ICD-10-CM: Z34.90  ICD-9-CM: V22.2  2020 Unknown             Lab Results   Component Value Date/Time    Rubella, External 2.73-Immune 2020    GrBStrep, External Negative 2018       Immunization(s):   Immunization History   Administered Date(s) Administered    Tdap 2018, 06/15/2020        Hospital Course: Normal hospital course following the delivery. The patient was released to her home in good condition. Patient Instructions:     Reference my discharge instructions.       Follow-up Appointments   Procedures    FOLLOW UP VISIT Appointment in: 6 Weeks     Standing Status:   Standing     Number of Occurrences:   1     Order Specific Question:   Appointment in     Answer:   6 Weeks        Signed By:  Mahsa Guerra MD     2020

## 2020-07-31 NOTE — PROGRESS NOTES
1930- Phone call from pt boyfriend stating that pt has her Subutex and Adderall with her and he is concerned that she will take extra doses. Pt admits to having her prescriptions with her in her purse. Per policy pt must give the meds to security to be locked up during her stay. Pt is not willing to do this. Security paged. 2000Kathlee Sailors from security notified of situation. 2010- Security at bedside. Pt refuses to give meds to security to be locked up. Pt gave meds to her \"friend\" who is at the bedside to take home.

## 2020-07-31 NOTE — PROGRESS NOTES
20 2:06 PM  CM met briefly with Doreen Conrad ([phone]) and her boyfriend/FOB Fernando Newman ([phone]). MOB noted that she was wanting to go to sleep and Aly was on his way to the NICU to visit baby Aileen Freed. Aileen Freed was born yesterday, on 2020, via  at 34w4d and she was admitted to NICU for prematurity. She is currently 2125g and on BCPAP for respiratory support. CHINTAN also uses 8mg Subutex TID for history of drug addiction; she also takes Adderrall 30mg BID. Used same medications during last pregnancy a year ago, so very understanding of HATTIE, withdrawal, observation period, etc.  Neither MOB nor FOB had questions regarding this. Demographics were reviewed and confirmed. MOB and FOB live together and also have a 3year old daughter. Both work and will return to work when ready. [facility].[address] will provide medical follow up for the baby. Patient has car seat, crib, clothing, and other necessary supplies. MOB noted that she is currently providing formula for the baby; she is getting [facility] 20 magdalena via OGT in the NICU. Has WIC and Medicaid services. CM will follow up. Care Management Interventions  PCP Verified by CM:  Yes  Mode of Transport at Discharge: [address] (CM Consult): Discharge Planning  Current Support Network: Lives with Spouse  Confirm Follow Up Transport: Family  Discharge Location  Discharge Placement: Home with two level  DARIANA Paiz

## 2020-07-31 NOTE — PROGRESS NOTES
4269  Bedside and Verbal shift change report given to MIGUELINA Dupont RN (oncoming nurse) by Key Honeycutt RN (offgoing nurse). Report included the following information SBAR, Kardex and MAR   0710  Pt ambulated off the unit to smoke outside. Leaves unit ambulatory despite being told she needs to have a w/c and some one with her. 8:10 AM Pt remains off of the unit. 0820  Pt back on the unit. Complete assessment done. Pt states she ordered her bkft. Instructed pt when she showers she needs to remove her dressing on her lower abd. And call me to assess once it is off. Pt stated she has voided once since her antunez was removed. Pt denies any further needs  0930  Pt in the NICU visiting with her baby Medicated with oxycodone 10mg po for c/o lower incisional discomfort  1000 Pt states relief of lower incisional discomfort  10:36 AM  Pt remains in the NICU with her baby  1100  Pt off of the unit ambulatory outside to smoke  1145  Pt back in her room  1200  Medicated with Adderal 30mg po and Motrin 800mg po for lower abd cramping. Pts  in the room with her  1230  Pt asleep  1300  Medicated with Subutex 8mg po per order  1:55 PM pt outside ambulatory to smoke despite being instructed to go in a wheelchair with her fiancee  2:42 PM last dose of oxycodone 10mg po given for lower incisional pain. 2:54 PM Pt off of the unit ambulatory to smoke despite instructions to go off the unit in a w/c with her fiance  1545  Pt returned to the unit  1600  Pt visiting her baby in the NICU  1730  Pt back to the room to shower  1845  Dressing removed from pts lower abd area. Site CYNTHIA and un remarked  Metal staples intacted.   Pt denies any further needs

## 2020-08-01 VITALS
DIASTOLIC BLOOD PRESSURE: 58 MMHG | BODY MASS INDEX: 23.24 KG/M2 | SYSTOLIC BLOOD PRESSURE: 103 MMHG | WEIGHT: 131.17 LBS | HEIGHT: 63 IN | TEMPERATURE: 98.6 F | HEART RATE: 76 BPM | OXYGEN SATURATION: 97 % | RESPIRATION RATE: 16 BRPM

## 2020-08-01 PROCEDURE — 74011250637 HC RX REV CODE- 250/637: Performed by: OBSTETRICS & GYNECOLOGY

## 2020-08-01 PROCEDURE — 74011636637 HC RX REV CODE- 636/637: Performed by: OBSTETRICS & GYNECOLOGY

## 2020-08-01 RX ADMIN — BUPRENORPHINE HCL 8 MG: 2 TABLET SUBLINGUAL at 13:29

## 2020-08-01 RX ADMIN — BUPRENORPHINE HCL 8 MG: 2 TABLET SUBLINGUAL at 06:41

## 2020-08-01 RX ADMIN — DEXTROAMPHETAMINE SACCHARATE, AMPHETAMINE ASPARTATE, DEXTROAMPHETAMINE SULFATE AND AMPHETAMINE SULFATE 30 MG: 2.5; 2.5; 2.5; 2.5 TABLET ORAL at 08:12

## 2020-08-01 RX ADMIN — IBUPROFEN 800 MG: 800 TABLET ORAL at 08:12

## 2020-08-01 RX ADMIN — IBUPROFEN 800 MG: 800 TABLET ORAL at 01:01

## 2020-08-01 NOTE — DISCHARGE INSTRUCTIONS
Section  Procedure-specific postoperative instructions  General Instructions   Make an appointment to come back to the office in about 2 weeks if staples removed. If staples in place at discharge, make an appointment for removal within 7 days of discharge. Eat and drink your normal diet. Take laxatives as needed. Call us if you have questions or problems. Incision care   With this procedure you will have an incision to care for. It  may be sensitive one the ends with some sharp pains and it may feel numb in the middle. Ice packs or heating pad (low setting--don't burn yourself) can be helpful. Treat these incision like normal skin. You can shower and wash this skin as you normally would. You may have steri-strips, like strapping tape bandaids covering your incision. You can remove those 10-14 days after surgery. You need to call the office if there is spreading redness around the incision, it feels hot, or has drainage other than just a mild blood-tinged appearance. Activity   You have surprisingly few  restrictions. You can walk, lift less than 15 pounds, go up and down stairs, and generally do what you feel like you can do. Now, you may not FEEL like doing much. Any major surgery is a stress on your body. So you may be tired, have no energy, may feel weak and listless. But no damage will occur if you lift  less than 15 pounds or do moderate activity. Listen to your body. If it is telling you to rest, do so. If you feel good, you won't hurt anything by going shopping of doing household activities. Bleeding   You will have bleeding. It may vary from time to time, heavier with activity and lighter at other times. An occasional clot is normal.  Heavy bleeding, like changing a pad every half hour, is not normal.  Call the office if your bleeding is heavy over several hours. Bleeding will generally slow down over time.   By six weeks it should be nearly gone but sometimes some spotting will persist.   If you are breastfeeding you may not have a period for months and your bleeding may resolve more quickly. Patient Education         Section: What to Expect at Home  Your Recovery     A  section, or , is surgery to deliver your baby through a cut, called an incision, that the doctor makes in your lower belly and uterus. You may have some pain in your lower belly and need pain medicine for 1 to 2 weeks. You can expect some vaginal bleeding for several weeks. You will probably need about 6 weeks to fully recover. It is important to take it easy while the incision is healing. Avoid heavy lifting, strenuous activities, or exercises that strain the belly muscles while you are recovering. Ask a family member or friend for help with housework, cooking, and shopping. This care sheet gives you a general idea about how long it will take for you to recover. But each person recovers at a different pace. Follow the steps below to get better as quickly as possible. How can you care for yourself at home? Activity  · Rest when you feel tired. Getting enough sleep will help you recover. · Try to walk each day. Start by walking a little more than you did the day before. Bit by bit, increase the amount you walk. Walking boosts blood flow and helps prevent pneumonia, constipation, and blood clots. · Avoid strenuous activities, such as bicycle riding, jogging, weightlifting, and aerobic exercise, for 6 weeks or until your doctor says it is okay. · Until your doctor says it is okay, do not lift anything heavier than your baby. · Do not do sit-ups or other exercises that strain the belly muscles for 6 weeks or until your doctor says it is okay. · Hold a pillow over your incision when you cough or take deep breaths. This will support your belly and decrease your pain. · You may shower as usual. Pat the incision dry when you are done.   · You will have some vaginal bleeding. Wear sanitary pads. Do not douche or use tampons until your doctor says it is okay. · Ask your doctor when you can drive again. · You will probably need to take at least 6 weeks off work. It depends on the type of work you do and how you feel. · Ask your doctor when it is okay for you to have sex. Diet  · You can eat your normal diet. If your stomach is upset, try bland, low-fat foods like plain rice, broiled chicken, toast, and yogurt. · Drink plenty of fluids (unless your doctor tells you not to). · You may notice that your bowel movements are not regular right after your surgery. This is common. Try to avoid constipation and straining with bowel movements. You may want to take a fiber supplement every day. If you have not had a bowel movement after a couple of days, ask your doctor about taking a mild laxative. · If you are breastfeeding, limit alcohol. Alcohol can cause a lack of energy and other health problems for the baby when a breastfeeding woman drinks heavily. It can also get in the way of a mom's ability to feed her baby or to care for the child in other ways. There isn't a lot of research about exactly how much alcohol can harm a baby. Having no alcohol is the safest choice for your baby. If you choose to have a drink now and then, have only one drink, and limit the number of occasions that you have a drink. Wait to breastfeed at least 2 hours after you have a drink to reduce the amount of alcohol the baby may get in the milk. Medicines  · Your doctor will tell you if and when you can restart your medicines. He or she will also give you instructions about taking any new medicines. · If you take aspirin or some other blood thinner, ask your doctor if and when to start taking it again. Make sure that you understand exactly what your doctor wants you to do. · Take pain medicines exactly as directed.   ? If the doctor gave you a prescription medicine for pain, take it as prescribed. ? If you are not taking a prescription pain medicine, ask your doctor if you can take an over-the-counter medicine. · If you think your pain medicine is making you sick to your stomach:  ? Take your medicine after meals (unless your doctor has told you not to). ? Ask your doctor for a different pain medicine. · If your doctor prescribed antibiotics, take them as directed. Do not stop taking them just because you feel better. You need to take the full course of antibiotics. Incision care  · If you have strips of tape on the incision, leave the tape on for a week or until it falls off. · Wash the area daily with warm, soapy water, and pat it dry. Don't use hydrogen peroxide or alcohol, which can slow healing. You may cover the area with a gauze bandage if it weeps or rubs against clothing. Change the bandage every day. · Keep the area clean and dry. Other instructions  · If you breastfeed your baby, you may be more comfortable while you are healing if you place the baby so that he or she is not resting on your belly. Try tucking your baby under your arm, with his or her body along the side you will be feeding on. Support your baby's upper body with your arm. With that hand you can control your baby's head to bring his or her mouth to your breast. This is sometimes called the football hold. Follow-up care is a key part of your treatment and safety. Be sure to make and go to all appointments, and call your doctor if you are having problems. It's also a good idea to know your test results and keep a list of the medicines you take. When should you call for help? Call  911 anytime you think you may need emergency care. For example, call if:  · You have thoughts of harming yourself, your baby, or another person. · You passed out (lost consciousness). · You have chest pain, are short of breath, or cough up blood. · You have a seizure.   Call your doctor now or seek immediate medical care if:  · You have pain that does not get better after you take pain medicine. · You have severe vaginal bleeding. · You are dizzy or lightheaded, or you feel like you may faint. · You have new or worse pain in your belly or pelvis. · You have loose stitches, or your incision comes open. · You have symptoms of infection, such as:  ? Increased pain, swelling, warmth, or redness. ? Red streaks leading from the incision. ? Pus draining from the incision. ? A fever. · You have symptoms of a blood clot in your leg (called a deep vein thrombosis), such as:  ? Pain in your calf, back of the knee, thigh, or groin. ? Redness and swelling in your leg or groin. · You have signs of preeclampsia, such as:  ? Sudden swelling of your face, hands, or feet. ? New vision problems (such as dimness, blurring, or seeing spots). ? A severe headache. Watch closely for changes in your health, and be sure to contact your doctor if:  · You do not get better as expected. Where can you learn more? Go to http://[employer] Water[employer].info/  Enter M806 in the search box to learn more about \" Section: What to Expect at Home. \"  Current as of: 2020               Content Version: 12.5  © 9935-5003 Healthwise, Incorporated. Care instructions adapted under license by [employer] (which disclaims liability or warranty for this information). If you have questions about a medical condition or this instruction, always ask your healthcare professional. [place] [address] any warranty or liability for your use of this information. Patient Education        Learning About Starting to Breastfeed  Planning ahead     Before your baby is born, plan ahead. Learn all you can about breastfeeding. This helps make breastfeeding easier. · Early in your pregnancy, talk to your doctor or midwife about breastfeeding. · Learn the basics before your baby is born.  The staff at hospitals and birthing centers can help you find a lactation specialist. This person is often a nurse who has been trained to teach and advise women about breastfeeding. Or you can take a breastfeeding class. · Plan ahead for times when you will need help after your baby is born. Many women get help from friends and family. Some join a support group to talk to other moms who breastfeed. · Buy the equipment you'll need. Examples are breast pads, nipple cream, extra pillows, and nursing bras. Find out about breast pumps too. Getting help from your hospital or birthing center  It's important to have support from the doctors, nurses, and hospital staff who care for you and your baby. Before it's time for you to give birth, ask about the breastfeeding policies at your hospital or birthing center. Look for a hospital or birthing center that has policies for:  · \"Rooming in. \" This policy encourages you to have your baby in the room with you. It can allow you to breastfeed more often. · Supplemental feedings. Tell the staff that your baby is to get only your breast milk from birth. If staff feed your baby water, sugar solution, or formula right after birth without a medical reason, it may make it harder for you to breastfeed. · Pacifiers or artificial nipples. Staff should not give your  these items. They may interfere with breastfeeding. · Follow-up. Find out if your hospital can help you with breastfeeding issues after you go home. See if you can get information on support groups or other contacts. They might help if you need help setting up and staying with your breastfeeding routine. Your first feeding  It's best to start breastfeeding within 1 hour of birth. For each feeding, you go through these basic steps:  · Get ready for the feeding. Be calm and relaxed, and try not to be distracted. Get some water or juice for yourself. Use two or three pillows to help support your baby while he or she is nursing.   · Find a breastfeeding position that is comfortable for you and your baby. Examples are the cradle and the football positions. Make sure the baby's head and chest are lined up straight and facing your breast. It's best to switch which breast you start with each time. · Get the baby latched on well. Your baby's mouth needs to be wide open, like a yawn, so you may need to gently touch the middle of your baby's lower lip. When your baby's mouth is open wide, quickly bring the baby onto your nipple and areola. The areola is the dark [place] around your nipple. · Provide a complete feeding. Let your baby decide how long to nurse. Be sure to burp your baby after each breast.  In the first days after birth, your breasts make a thick, yellow liquid called colostrum. This liquid gives your baby nutrients and antibodies against infection. It is all that babies need at first. Your breasts will fill with milk a few days after the birth. Talk to your doctor, midwife, or lactation specialist right away if you are having problems and aren't sure what to do. How often to breastfeed  Plan to breastfeed your baby on demand rather than setting a strict schedule. For the first 2 weeks, be prepared to breastfeed at least 8 times in a 24-hour period. In the first few days, you may need to wake a sleeping baby to feed. If you breastfeed more often, it will help your breasts to produce more milk. After you go home  After you're home, don't be afraid to call your doctor, midwife, or lactation specialist with questions. That's true even if you don't know what's bothering you. They are used to parents of newborns calling. They can help you figure out if there is a problem, and if so, how to fix it. Plan for times when you will be apart from your baby. Use a breast pump to collect breast milk ahead of time. You can store milk in the refrigerator or freezer. Then it's ready when someone else will be taking care of your baby.  Experts recommend waiting about a month until breastfeeding is going well before offering a bottle. Breastfeeding is a learned skill that gets easier over time. You are more likely to succeed if you plan ahead, learn the basic techniques, and know where to get help and support. Where can you learn more? Go to http://mingo-kerri.info/  Enter Q917 in the search box to learn more about \"Learning About Starting to Breastfeed. \"  Current as of: February 11, 2020               Content Version: 12.5  © 6740-4947 Healthwise, Incorporated. Care instructions adapted under license by [employer] (which disclaims liability or warranty for this information). If you have questions about a medical condition or this instruction, always ask your healthcare professional. [place] [address] any warranty or liability for your use of this information. Physicians Order for Wound Care                                      Discharge Instructions                   These are the instructions to follow for care of your wound and any follow- up appointments.  Please call the [address] if you have any questions or if you experience any of the following :        · Increase in pain                                     · Temperature over 101 degrees Fahrenheit    ·  Increase in drainage            · Drainage with a foul odor    ·  Bleeding    ·  Increase in Swelling    · Compression bandage changes ( slippage, breakthrough drainage)

## 2020-08-01 NOTE — PROGRESS NOTES
1935: in pts room to complete assessment    1950: pt ambulated off unit to cafeteria    2030: pt back in room    2155: pt ambulated off unit outside to smoke    2227: patient back on the unit, patient called for pain medication and ambien. Pain medication and Ramo Silk taken to the patient    0801: mom walking around the room, states 'getting ready to lay down for bed'  Patient states pain is better after the medication    0055: mom resting in bed. Motrin taken to patient. Reassessment completed at this time. 1377: Patient stated she takes her subutex at 0700. Subutex taken to mom. 0730: SBAR report given to ERMA Armenta

## 2020-08-01 NOTE — PROGRESS NOTES
0730 Received report from Dante Mensah RN.     2211 Patient off unit. [phone] Patient requested to be discharged early, RN discussed plan of care with Dr Irving Woods. RN received verbal telephone orders with read back to discharge patient home with follow up appointment with Dr Rosalia Beck later in the week to remove staples. RN will get Discharge instructions together for patient. 4420 Patient given discharge instructions and the opportunity for questions. No questions at this time. Patient given the [employer] your Life worksheet and taught the signs and symptoms to look for and who to call to follow up. Patient ambulated off the floor with all belongings.

## 2020-08-13 ENCOUNTER — OFFICE VISIT (OUTPATIENT)
Dept: OBGYN CLINIC | Age: 35
End: 2020-08-13
Payer: MEDICAID

## 2020-08-13 VITALS
HEIGHT: 63 IN | DIASTOLIC BLOOD PRESSURE: 74 MMHG | SYSTOLIC BLOOD PRESSURE: 128 MMHG | BODY MASS INDEX: 20.16 KG/M2 | WEIGHT: 113.8 LBS

## 2020-08-13 DIAGNOSIS — Z09 POSTOP CHECK: Primary | ICD-10-CM

## 2020-08-13 PROCEDURE — 0503F POSTPARTUM CARE VISIT: CPT | Performed by: OBSTETRICS & GYNECOLOGY

## 2020-08-13 NOTE — PROGRESS NOTES
Postop Staple removal  Bhargavi Morales is a 29 y.o. female returns for a routine post-operative follow-up visit after undergoing a  section which was done 2 weeks ago. She had the following pathology results: none sent. Since the patient's surgery, she has had typical postoperative discomfort but no significant symptoms or problems since the surgery. The patient's incision is intact with no significant drainage. She states since the procedure, she has remained afebrile with no GI or  problems. PHYSICAL EXAMINATION    Gastrointestinal  · Abdominal Examination: abdomen non-tender to palpation, staple line intact with superficial redness at the staples with underlying edema but no tenderness or other sign of infection, normal bowel sounds, no masses present  · Liver and spleen: no hepatomegaly present, spleen not palpable  · Hernias: no hernias identified    Skin  · General Inspection: no rash, no lesions identified    Neurologic/Psychiatric  · Mental Status:  · Orientation: grossly oriented to person, place and time  · Mood and Affect: mood normal, affect appropriate    Assessment:  Normal postop staple removal.  Staples removed. Plan:  RTO as scheduled for PP checkup at 6 weeks with Mirena.

## 2020-08-13 NOTE — PATIENT INSTRUCTIONS
Section: What to Expect at [address] [place] [address]     A  section, or , is surgery to deliver your baby through a cut, called an incision, that the doctor makes in your lower belly and uterus. You may have some pain in your lower belly and need pain medicine for 1 to 2 weeks. You can expect some vaginal bleeding for several weeks. You will probably need about 6 weeks to fully recover. It is important to take it easy while the incision is healing. Avoid heavy lifting, strenuous activities, or exercises that strain the belly muscles while you are recovering. Ask a family member or friend for help with housework, cooking, and shopping. This care sheet gives you a general idea about how long it will take for you to recover. But each person recovers at a different pace. Follow the steps below to get better as quickly as possible. How can you care for yourself at home? Activity  · Rest when you feel tired. Getting enough sleep will help you recover. · Try to walk each day. Start by walking a little more than you did the day before. Bit by bit, increase the amount you walk. Walking boosts blood flow and helps prevent pneumonia, constipation, and blood clots. · Avoid strenuous activities, such as bicycle riding, jogging, weightlifting, and aerobic exercise, for 6 weeks or until your doctor says it is okay. · Until your doctor says it is okay, do not lift anything heavier than your baby. · Do not do sit-ups or other exercises that strain the belly muscles for 6 weeks or until your doctor says it is okay. · Hold a pillow over your incision when you cough or take deep breaths. This will support your belly and decrease your pain. · You may shower as usual. Pat the incision dry when you are done. · You will have some vaginal bleeding. Wear sanitary pads. Do not douche or use tampons until your doctor says it is okay. · Ask your doctor when you can drive again.   · You will probably need to take at least 6 weeks off work. It depends on the type of work you do and how you feel. · Ask your doctor when it is okay for you to have sex. Diet  · You can eat your normal diet. If your stomach is upset, try bland, low-fat foods like plain rice, broiled chicken, toast, and yogurt. · Drink plenty of fluids (unless your doctor tells you not to). · You may notice that your bowel movements are not regular right after your surgery. This is common. Try to avoid constipation and straining with bowel movements. You may want to take a fiber supplement every day. If you have not had a bowel movement after a couple of days, ask your doctor about taking a mild laxative. · If you are breastfeeding, limit alcohol. Alcohol can cause a lack of energy and other health problems for the baby when a breastfeeding woman drinks heavily. It can also get in the way of a mom's ability to feed her baby or to care for the child in other ways. There isn't a lot of research about exactly how much alcohol can harm a baby. Having no alcohol is the safest choice for your baby. If you choose to have a drink now and then, have only one drink, and limit the number of occasions that you have a drink. Wait to breastfeed at least 2 hours after you have a drink to reduce the amount of alcohol the baby may get in the milk. Medicines  · Your doctor will tell you if and when you can restart your medicines. He or she will also give you instructions about taking any new medicines. · If you take aspirin or some other blood thinner, ask your doctor if and when to start taking it again. Make sure that you understand exactly what your doctor wants you to do. · Take pain medicines exactly as directed. ? If the doctor gave you a prescription medicine for pain, take it as prescribed. ? If you are not taking a prescription pain medicine, ask your doctor if you can take an over-the-counter medicine.   · If you think your pain medicine is making you sick to your stomach:  ? Take your medicine after meals (unless your doctor has told you not to). ? Ask your doctor for a different pain medicine. · If your doctor prescribed antibiotics, take them as directed. Do not stop taking them just because you feel better. You need to take the full course of antibiotics. Incision care  · If you have strips of tape on the incision, leave the tape on for a week or until it falls off. · Wash the area daily with warm, soapy water, and pat it dry. Don't use hydrogen peroxide or alcohol, which can slow healing. You may cover the area with a gauze bandage if it weeps or rubs against clothing. Change the bandage every day. · Keep the area clean and dry. Other instructions  · If you breastfeed your baby, you may be more comfortable while you are healing if you place the baby so that he or she is not resting on your belly. Try tucking your baby under your arm, with his or her body along the side you will be feeding on. Support your baby's upper body with your arm. With that hand you can control your baby's head to bring his or her mouth to your breast. This is sometimes called the football hold. Follow-up care is a key part of your treatment and safety. Be sure to make and go to all appointments, and call your doctor if you are having problems. It's also a good idea to know your test results and keep a list of the medicines you take. When should you call for help? Call  911 anytime you think you may need emergency care. For example, call if:  · You have thoughts of harming yourself, your baby, or another person. · You passed out (lost consciousness). · You have chest pain, are short of breath, or cough up blood. · You have a seizure. Call your doctor now or seek immediate medical care if:  · You have pain that does not get better after you take pain medicine. · You have severe vaginal bleeding. · You are dizzy or lightheaded, or you feel like you may faint.   · You have new or worse pain in your belly or pelvis. · You have loose stitches, or your incision comes open. · You have symptoms of infection, such as:  ? Increased pain, swelling, warmth, or redness. ? Red streaks leading from the incision. ? Pus draining from the incision. ? A fever. · You have symptoms of a blood clot in your leg (called a deep vein thrombosis), such as:  ? Pain in your calf, back of the knee, thigh, or groin. ? Redness and swelling in your leg or groin. · You have signs of preeclampsia, such as:  ? Sudden swelling of your face, hands, or feet. ? New vision problems (such as dimness, blurring, or seeing spots). ? A severe headache. Watch closely for changes in your health, and be sure to contact your doctor if:  · You do not get better as expected. Where can you learn more? Go to http://www.santana.com/  Enter M806 in the search box to learn more about \" Section: What to Expect at Home. \"  Current as of: 2020               Content Version: 12.5  © 4761-7228 Healthwise, Incorporated. Care instructions adapted under license by [employer] (which disclaims liability or warranty for this information). If you have questions about a medical condition or this instruction, always ask your healthcare professional. [address] any warranty or liability for your use of this information.

## 2021-10-28 ENCOUNTER — TELEPHONE (OUTPATIENT)
Dept: OBGYN CLINIC | Age: 36
End: 2021-10-28

## 2021-10-28 NOTE — TELEPHONE ENCOUNTER
Pt calling with c/o burning when urinating and strong urine smell. This RN attempted to schedule pt next week, but pt has hard time with childcare and she currently has a fever. This RN advised pt she can always be seen at urgent care or her PCP. Pt verbalized understanding.

## 2021-11-10 ENCOUNTER — TELEPHONE (OUTPATIENT)
Dept: OBGYN CLINIC | Age: 36
End: 2021-11-10

## 2021-11-10 NOTE — TELEPHONE ENCOUNTER
Message left at 3:06PM      39year old patient last seen in the office on 8.13.2020 for post op check    Patient left a message stating that she needs a dental letter to go to the dentist    This nurse called the patient back she reports that her LMP is 9/9/2021 (8 w6d)? Patient has not yet been sent in the office for pregnancy    Patient was advised to call back in the am to set her first ob and ultrasound . Patient was explained that since we have not seen her yet we can not write a note that she is pregnant    Patient verbalized understanding.

## 2021-12-10 ENCOUNTER — TELEPHONE (OUTPATIENT)
Dept: OBGYN CLINIC | Age: 36
End: 2021-12-10

## 2021-12-10 ENCOUNTER — PATIENT MESSAGE (OUTPATIENT)
Dept: OBGYN CLINIC | Age: 36
End: 2021-12-10

## 2021-12-10 RX ORDER — ONDANSETRON 8 MG/1
8 TABLET, ORALLY DISINTEGRATING ORAL
Qty: 30 TABLET | Refills: 2 | Status: SHIPPED | OUTPATIENT
Start: 2021-12-10 | End: 2022-06-09

## 2021-12-10 NOTE — TELEPHONE ENCOUNTER
Call received at 11:00am      39year old patient last seen in the office on 8/13/2020 for post check        Patient calling to say that she missed her appointment on 11/24/2021    Patient reports her LMP is 9/6/2021 13w4d pregnant    Patient was placed on the schedule to be seen for first visit on 12/13/2021 at 3:20Pm      Patient is complaining of nausea and that she can not keep anything down    Patient was advised to try vitamin b 6 and unisom otc put patient would like something called in to the pharmacy      [place] [id number] [facility]        Please advise  Thank you

## 2021-12-10 NOTE — TELEPHONE ENCOUNTER
This nurse attempted to reach the patient and was left a detailed message about the appointment changes and the medication sent to her pharmacy

## 2022-01-25 ENCOUNTER — TELEPHONE (OUTPATIENT)
Dept: OBGYN CLINIC | Age: 37
End: 2022-01-25

## 2022-01-25 NOTE — TELEPHONE ENCOUNTER
Call received at 11:00am      39year old patient last seen in the office on 8/13/2020 for post op     Patient is about 20 weeks pregnant and has scheduled many appointments, not showed up and rescheduled     FOB not on hippa calling to say that she ( patient is doing drugs and he is concerned about his baby    Patient was schedule to be seen at 11:00am on 2/2./2022 to arrive at ( 10:45am) and than Md at 1:00PM    FOB verbalized understanding and was advised of need to have patient to sign the HIPPA form

## 2022-02-01 NOTE — PROGRESS NOTES
Current pregnancy history:    John Ramos is a 39 y.o. female who presents for the evaluation of pregnancy. Patient's last menstrual period was 2021. LMP history:  The date of her LMP is  certain. Her last menstrual period was normal and lasted for 4 to 5 days. A urine pregnancy test was positive several weeks ago. She was not on the pill at conception. Based on her LMP, her EDC is 2022 and her EGA is 21 weeks, 2 days. Her menstrual cycles are regular and occur approximately every 28 days  and range from 3 to 5 days. The last menses lasted  the usual number of days. Ultrasound data:  A SINGLE 21W0D IUP IS SEEN BY TODAY'S ULTRASOUND. FETAL CARDIAC MOTION OBSERVED. LIMITED ANATOMY WAS VISUALIZED AND APPEARS WNL. APPROPRIATE FETAL GROWTH IS SEEN. SIZE=DATES. JOSE AND PLACENTA APPEAR WITHIN NORMAL LIMITS. Pregnancy symptoms:    Since her LMP she has experienced  urinary frequency, breast tenderness, and nausea. She has not been vomiting over the last few weeks. Associated signs and symptoms which she denies: dysuria, discharge, vaginal bleeding. She states she has  gained weight:  Approximately a few pounds over the last few weeks. Relevant past pregnancy history:   She has the following pregnancy history: / CS x4    She has a history of  delivery. Relevant past medical history:(relevant to this pregnancy): noncontributory. Pap/Occupational history:  Last pap smear: 2 years ago Results: ASCUS HPV POS    Substance history: positive for alcohol, tobacco and street drugs. Cocaine and heroin. Positive for those. Suboxin now. Exposure history: There is/are no indoor cat/s in the home. The patient was instructed to not change the cat litter. She admits close contact with children on a regular basis. She has had chicken pox or the vaccine in the past.   Patient denies issues with domestic violence.      Genetic Screening/Teratology Counseling: (Includes patient, baby's father, or anyone in either family with:)  3.  Patient's age >/= 28 at [facility]?-- 39.   2.  [facility] ([facility] [place], [address], or  background): MCV<80?--no.     3.  Neural tube defect (meningomyelocele, spina bifida, anencephaly)?--no.   4.  Congenital heart defect?--no.  5.  Down syndrome?--no.   6.  Adrian-Sachs ([place]abaptism, [place])?--no.   7.  Canavan's Disease?--no.   8.  Familial Dysautonomia?--no.   9.  Sickle cell disease or trait ()? --no   The patient has not been tested for sickle trait  10. Hemophilia or other blood disorders?--no. 11.  Muscular dystrophy?--no. 12.  Cystic fibrosis?--no. 13.  Angie's Chorea?--no. 14.  Mental retardation/autism (if yes was person tested for Fragile X)?--no. 15.  Other inherited genetic or chromosomal disorder?--no. 12.  Maternal metabolic disorder (DM, PKU, etc)?--no. 17.  Patient or FOB with a child with a birth defect not listed above?--no.  17a. Patient or FOB with a birth defect themselves?--no. 18.  Recurrent pregnancy loss, or stillbirth?--no. 19.  Any medications since LMP other than prenatal vitamins (include vitamins, supplements, OTC meds, drugs, alcohol)?--no. 20.  Any other genetic/environmental exposure to discuss?--no. Infection History:  1. Lives with someone with TB or TB exposed?--no.   2.  Patient or partner has history of genital herpes?--no.  3.  Rash or viral illness since LMP?--no.    4.  History of STD (GC, CT, HPV, syphilis, HIV)? --Trich  5. Other: OTHER?       Past Medical History:   Diagnosis Date    Abnormal Pap smear 12/28/11    LGSIL + HPV    Abnormal Papanicolaou smear of cervix     ADHD (attention deficit hyperactivity disorder)     Asthma     Atypical squamous cells of undetermined significance (ASCUS) on Papanicolaou smear of cervix 02/07/2020    Cervical high risk HPV (human papillomavirus) test positive 2/7/2020,03/30/2018    HPV test positive 12/28/11    Low grade squamous intraepithelial lesion (LGSIL) on Papanicolaou smear of cervix 2018    Postpartum depression     Psychiatric problem     drug addiction- opiates, initial dependency 5 years ago. .methadone [place]    Trichomonas infection 2014     Past Surgical History:   Procedure Laterality Date    HX COLPOSCOPY  2020    no bx due to pregnancy    HX DILATION AND CURETTAGE      HX TONSILLECTOMY      HX WISDOM TEETH EXTRACTION      LA  DELIVERY ONLY       Social History     Occupational History    Not on file   Tobacco Use    Smoking status: Current Every Day Smoker     Packs/day: 1.00     Years: 15.00     Pack years: 15.00     Types: Cigarettes     Start date: 10/1/2004    Smokeless tobacco: Never Used    Tobacco comment: Want to quit but find it EXTREMELY HARD   Substance and Sexual Activity    Alcohol use: Not Currently     Alcohol/week: 0.0 standard drinks    Drug use: Yes     Types: Other     Comment: subutex    Sexual activity: Yes     Partners: Male     Birth control/protection: None     Family History   Problem Relation Age of Onset    Drug Abuse Mother     Migraines Mother     Drug Abuse Father     OSTEOARTHRITIS Maternal Grandmother     Asthma Brother        Allergies   Allergen Reactions    Cephalosporins Hives    Codeine Hives    Pcn [Penicillins] Hives     Prior to Admission medications    Medication Sig Start Date End Date Taking? Authorizing Provider   ondansetron (ZOFRAN ODT) 8 mg disintegrating tablet Take 1 Tablet by mouth every eight (8) hours as needed for Nausea. 12/10/21  Yes Abdi Edwards MD   albuterol (PROVENTIL HFA, VENTOLIN HFA, PROAIR HFA) 90 mcg/actuation inhaler Take 1-2 Puffs by inhalation every four (4) hours as needed for Wheezing. 19  Yes Geovani Underwood NP   buprenorphine HCl (SUBUTEX) 8 mg sublingual tablet 8 mg by SubLINGual route three (3) times daily.  Indications: prevention of relapse to opioid dependence   Yes Provider, Historical   PNV No12-Iron-FA-DSS-OM-3 29 mg iron-1 mg -50 mg CPKD Take  by mouth. Yes Provider, Historical   amphetamine-dextroamphetamine (ADDERALL) 20 mg tablet Take 30 mg by mouth two (2) times a day. Indications: attention deficit disorder with hyperactivity   Yes Provider, Historical   methylPREDNISolone (MEDROL, WENDY,) 4 mg tablet Take as directed 5/27/19   Cameron Underwood NP   acetaminophen (TYLENOL) 325 mg tablet Take 2 Tabs by mouth every four (4) hours as needed. 9/17/18   Camille Lord MD   ibuprofen (MOTRIN) 800 mg tablet Take 1 Tab by mouth every eight (8) hours as needed for Pain. 9/17/18   Camille Lord MD   Dextromethorphan-guaiFENesin [facility] DM) 60-1,200 mg TM12 Take 1 Tab by mouth every twelve (12) hours as needed (congestion).  9/27/15   Flasschoen, Emmalene Morrow, PA        Review of Systems: History obtained from the patient  Constitutional: negative for weight loss, fever, night sweats  HEENT: negative for hearing loss, earache, congestion, snoring, sorethroat  CV: negative for chest pain, palpitations, edema  Resp: negative for cough, shortness of breath, wheezing  Breast: negative for breast lumps, nipple discharge, galactorrhea  GI: negative for change in bowel habits, abdominal pain, black or bloody stools  : negative for frequency, dysuria, hematuria, vaginal discharge  MSK: negative for back pain, joint pain, muscle pain  Skin: negative for itching, rash, hives  Neuro: negative for dizziness, headache, confusion, weakness  Psych: negative for anxiety, depression, change in mood  Heme/lymph: negative for bleeding, bruising, pallor    Objective:  Visit Vitals  /60   Wt 119 lb (54 kg)   LMP 09/06/2021   Breastfeeding No   BMI 21.08 kg/m²       Physical Exam:   PHYSICAL EXAMINATION    Constitutional  · Appearance: well-nourished, well developed, alert, in no acute distress    HENT  · Head  · Face: appears normal  · Eyes: appear normal  · Ears: normal  · Mouth: normal  · Lips: no lesions    Neck  · Inspection/Palpation: normal appearance, no masses or tenderness  · Lymph Nodes: no lymphadenopathy present  · Thyroid: gland size normal, nontender, no nodules or masses present on palpation    Chest  · Respiratory Effort: breathing unlabored  · Auscultation: normal breath sounds    Cardiovascular  · Heart:  · Auscultation: regular rate and rhythm without murmur    Breasts  · Inspection of Breasts: breasts symmetrical, no skin changes, no discharge present, nipple appearance normal, no skin retraction present  · Palpation of Breasts and Axillae: no masses present on palpation, no breast tenderness  · Axillary Lymph Nodes: no lymphadenopathy present    Gastrointestinal  · Abdominal Examination: abdomen non-tender to palpation, normal bowel sounds, no masses present  · Liver and spleen: no hepatomegaly present, spleen not palpable  · Hernias: no hernias identified    Genitourinary  · External Genitalia: normal appearance for age, no discharge present, no tenderness present, no inflammatory lesions present, no masses present, no atrophy present  · Vagina: normal vaginal vault without central or paravaginal defects, no discharge present, no inflammatory lesions present, no masses present  · Bladder: non-tender to palpation  · Urethra: appears normal  · Cervix: normal   · Uterus: enlarged, normal shape, soft  · Adnexa: no adnexal tenderness present, no adnexal masses present  · Perineum: perineum within normal limits, no evidence of trauma, no rashes or skin lesions present  · Anus: anus within normal limits, no hemorrhoids present  · Inguinal Lymph Nodes: no lymphadenopathy present    Skin  · General Inspection: no rash, no lesions identified    Neurologic/Psychiatric  · Mental Status:  · Orientation: grossly oriented to person, place and time  · Mood and Affect: mood normal, affect appropriate    Assessment:   Intrauterine pregnancy with the following problems identified: No prenatal care til 96/YZE alcoholic and illicit/recreational drug use up until 18 weeks --started on suboxone/previous C/S X 4.        Plan:   Discussed too late for CVS, Nuchal Translucency, MSAFP, amnio, and NIPT  Course of pregnancy discussed including visit schedule, routine U/S, glucola testing, etc.  Take prenatal vitamins or folic acid daily. Hospital and practice style discussed with coverage system. Discussed nutrition, toxoplasmosis precautions, sexual activity, exercise, need for influenza vaccine, environmental and work hazards, travel advice, screen for domestic violence, need for seat belts. Discussed seafood, unpasteurized dairy products, deli meat, artificial sweeteners, and caffeine. Information on prenatal classes/breastfeeding given. Information on circumcision given  Patient encouraged not to smoke. Discussed current prescription drug use. Given medication list.  Discussed the use of over the counter medications and chemicals. Route of delivery discussed, including lack of alternatives to repeat LTCS. Pt understands risk of hemorrhage during pregnancy and post delivery and would accept blood products if necessary in life-threatening emergencies      Handouts given to pt.

## 2022-02-02 ENCOUNTER — ROUTINE PRENATAL (OUTPATIENT)
Dept: OBGYN CLINIC | Age: 37
End: 2022-02-02

## 2022-02-02 VITALS — WEIGHT: 119 LBS | SYSTOLIC BLOOD PRESSURE: 120 MMHG | BODY MASS INDEX: 21.08 KG/M2 | DIASTOLIC BLOOD PRESSURE: 60 MMHG

## 2022-02-02 DIAGNOSIS — Z32.01 PREGNANCY EXAMINATION OR TEST, POSITIVE RESULT: ICD-10-CM

## 2022-02-02 DIAGNOSIS — Z12.4 CERVICAL CANCER SCREENING: ICD-10-CM

## 2022-02-02 DIAGNOSIS — Z34.81 ENCOUNTER FOR SUPERVISION OF OTHER NORMAL PREGNANCY, FIRST TRIMESTER: ICD-10-CM

## 2022-02-02 DIAGNOSIS — Z01.419 ENCOUNTER FOR ANNUAL ROUTINE GYNECOLOGICAL EXAMINATION: Primary | ICD-10-CM

## 2022-02-02 PROBLEM — Z34.80 ENCOUNTER FOR SUPERVISION OF OTHER NORMAL PREGNANCY, UNSPECIFIED TRIMESTER: Status: ACTIVE | Noted: 2020-07-30

## 2022-02-02 LAB
ANTIBODY SCREEN, EXTERNAL: NEGATIVE
CHLAMYDIA, EXTERNAL: NORMAL
HBSAG, EXTERNAL: NEGATIVE
HIV, EXTERNAL: NON REACTIVE
N. GONORRHEA, EXTERNAL: NORMAL
PAP SMEAR, EXTERNAL: NORMAL
RPR, EXTERNAL: NON REACTIVE
RUBELLA, EXTERNAL: NORMAL

## 2022-02-02 PROCEDURE — 0501F PRENATAL FLOW SHEET: CPT | Performed by: OBSTETRICS & GYNECOLOGY

## 2022-02-02 NOTE — PATIENT INSTRUCTIONS
Weeks 18 to 22 of Your Pregnancy: Care Instructions  Overview     Your baby is continuing to develop quickly. Sometime between 18 and 22 weeks, you'll probably start to feel your baby move. At first, these small fetal movements feel like fluttering or \"butterflies. \" Or they may feel like gas bubbles. As your baby grows, these movements will become stronger. You may also notice that your baby hiccups. Babies at this stage can now suck their thumbs. You may find that your nausea and fatigue are gone. You may feel better overall and have more energy than you did in your first trimester. But you might now also have some new discomforts, like sleep problems or leg cramps. Talk to your doctor about things you can do at home to ease these problems. Follow-up care is a key part of your treatment and safety. Be sure to make and go to all appointments, and call your doctor if you are having problems. It's also a good idea to know your test results and keep a list of the medicines you take. How can you care for yourself at home? Ease sleep problems  · Avoid caffeine in drinks or chocolate late in the day. · Get some exercise every day. · Take a warm shower or bath before bed. · Have a light snack or glass of milk at bedtime. · Do relaxation exercises in bed to calm your mind and body. · Support your legs and back with extra pillows. Try a pillow between your legs if you sleep on your side. · Do not use sleeping pills or alcohol. They could harm your baby. Ease leg cramps  · Do not massage your calf during the cramp. · Sit on a firm bed or chair. Straighten your leg, and bend your foot (flex your ankle) slowly upward, toward your knee. Bend your toes up and down. · Stand on a cool, flat surface. Stretch your toes upward, and take small steps walking on your heels. · Use a heating pad or hot water bottle to help with muscle ache. Prevent leg cramps  · Be sure to get enough calcium.  If you are worried that you are not getting enough, talk to your doctor. · Exercise every day, and stretch your legs before bed. · Take a warm bath before bed, and try leg warmers at night. Where can you learn more? Go to http://www.gray.com/  Enter P471 in the search box to learn more about \"Weeks 18 to 22 of Your Pregnancy: Care Instructions. \"  Current as of: June 16, 2021               Content Version: 13.0  © 1893-8604 Healthwise, Incorporated. Care instructions adapted under license by [employer] (which disclaims liability or warranty for this information). If you have questions about a medical condition or this instruction, always ask your healthcare professional. [address] any warranty or liability for your use of this information.

## 2022-02-04 LAB
ABO GROUP BLD: NORMAL
BLD GP AB SCN SERPL QL: NEGATIVE
ERYTHROCYTE [DISTWIDTH] IN BLOOD BY AUTOMATED COUNT: 12.4 % (ref 11.7–15.4)
HBV SURFACE AG SERPL QL IA: NEGATIVE
HCT VFR BLD AUTO: 35.1 % (ref 34–46.6)
HGB BLD-MCNC: 11.8 G/DL (ref 11.1–15.9)
HIV 1+2 AB+HIV1 P24 AG SERPL QL IA: NON REACTIVE
MCH RBC QN AUTO: 31.6 PG (ref 26.6–33)
MCHC RBC AUTO-ENTMCNC: 33.6 G/DL (ref 31.5–35.7)
MCV RBC AUTO: 94 FL (ref 79–97)
PLATELET # BLD AUTO: 496 X10E3/UL (ref 150–450)
RBC # BLD AUTO: 3.74 X10E6/UL (ref 3.77–5.28)
RH BLD: POSITIVE
RPR SER QL: NON REACTIVE
RUBV IGG SERPL IA-ACNC: 2.26 INDEX
SPECIMEN STATUS REPORT, ROLRST: NORMAL
WBC # BLD AUTO: 15.7 X10E3/UL (ref 3.4–10.8)

## 2022-02-07 LAB
C TRACH RRNA CVX QL NAA+PROBE: NEGATIVE
CYTOLOGIST CVX/VAG CYTO: ABNORMAL
CYTOLOGY CVX/VAG DOC CYTO: ABNORMAL
CYTOLOGY CVX/VAG DOC THIN PREP: ABNORMAL
CYTOLOGY HISTORY:: ABNORMAL
DX ICD CODE: ABNORMAL
DX ICD CODE: ABNORMAL
HPV I/H RISK 4 DNA CVX QL PROBE+SIG AMP: POSITIVE
Lab: ABNORMAL
N GONORRHOEA RRNA CVX QL NAA+PROBE: NEGATIVE
OTHER STN SPEC: ABNORMAL
PATHOLOGIST CVX/VAG CYTO: ABNORMAL
STAT OF ADQ CVX/VAG CYTO-IMP: ABNORMAL
T VAGINALIS RRNA SPEC QL NAA+PROBE: NEGATIVE

## 2022-03-18 PROBLEM — Z34.80 ENCOUNTER FOR SUPERVISION OF OTHER NORMAL PREGNANCY, UNSPECIFIED TRIMESTER: Status: ACTIVE | Noted: 2020-07-30

## 2022-03-23 ENCOUNTER — ROUTINE PRENATAL (OUTPATIENT)
Dept: OBGYN CLINIC | Age: 37
End: 2022-03-23
Payer: MEDICAID

## 2022-03-23 VITALS — SYSTOLIC BLOOD PRESSURE: 110 MMHG | BODY MASS INDEX: 22.14 KG/M2 | DIASTOLIC BLOOD PRESSURE: 50 MMHG | WEIGHT: 125 LBS

## 2022-03-23 DIAGNOSIS — Z23 ENCOUNTER FOR IMMUNIZATION: ICD-10-CM

## 2022-03-23 DIAGNOSIS — Z34.83 ENCOUNTER FOR SUPERVISION OF OTHER NORMAL PREGNANCY, THIRD TRIMESTER: Primary | ICD-10-CM

## 2022-03-23 PROCEDURE — 0502F SUBSEQUENT PRENATAL CARE: CPT | Performed by: OBSTETRICS & GYNECOLOGY

## 2022-03-23 PROCEDURE — 90471 IMMUNIZATION ADMIN: CPT | Performed by: OBSTETRICS & GYNECOLOGY

## 2022-03-23 PROCEDURE — 90715 TDAP VACCINE 7 YRS/> IM: CPT | Performed by: OBSTETRICS & GYNECOLOGY

## 2022-03-23 NOTE — PATIENT INSTRUCTIONS
Weeks 26 to 30 of Your Pregnancy: Care Instructions  Overview     You are now entering your last trimester of pregnancy. Your baby is growing quickly. Harlen Severin probably feel your baby moving around more often. Your doctor may ask you to count your baby's kicks. Your back may ache as your body gets used to your baby's size and length. If you haven't already had the Tdap shot during this pregnancy, talk to your doctor about getting it. It will help protect your  against pertussis infection. During this time, it's important to take care of yourself and pay attention to what your body needs. If you feel sexual, you can explore ways to be close with your partner that match your comfort and desire. Follow-up care is a key part of your treatment and safety. Be sure to make and go to all appointments, and call your doctor if you are having problems. It's also a good idea to know your test results and keep a list of the medicines you take. How can you care for yourself at home? Take it easy at work  · Take frequent breaks. If possible, stop working when you are tired, and rest during your lunch hour. · Take bathroom breaks every 2 hours. · Change positions often. If you sit for long periods, stand up and walk around. · When you stand for a long time, keep one foot on a low stool with your knee bent. After standing a lot, sit with your feet up. · Avoid fumes, chemicals, and tobacco smoke. Be sexual in your own way  · Having sex during pregnancy is okay, unless your doctor tells you not to. · You may be very interested in sex, or you may have no interest at all. · Your growing belly can make it hard to find a good position during intercourse. Cactus and explore. · You may get cramps in your uterus when your partner touches your breasts. · A back rub may relieve the backache or cramps that sometimes follow orgasm. Learn about  labor  · Watch for signs of  labor.  You may be going into labor if:  ? You have menstrual-like cramps, with or without nausea. ? You have about 6 or more contractions in 1 hour, even after you have had a glass of water and are resting. ? You have a low, dull backache that does not go away when you change your position. ? You have pain or pressure in your pelvis that comes and goes in a pattern. ? You have intestinal cramping or flu-like symptoms, with or without diarrhea.  ? You notice an increase or change in your vaginal discharge. Discharge may be heavy, mucus-like, watery, or streaked with blood. ? Your water breaks. · If you think you have  labor:  ? Drink 2 or 3 glasses of water or juice. Not drinking enough fluids can cause contractions. ? Stop what you are doing, and empty your bladder. Then lie down on your left side for at least 1 hour. ? While lying on your side, find your breast bone. Put your fingers in the soft spot just below it. Move your fingers down toward your belly button to find the top of your uterus. Check to see if it is tight. ? Contractions can be weak or strong. Record your contractions for an hour. Time a contraction from the start of one contraction to the start of the next one.  ? Single or several strong contractions without a pattern are called Allen-Arriola contractions. They are practice contractions but not the start of labor. They often stop if you change what you are doing. ? Call your doctor if you have regular contractions. Where can you learn more? Go to http://www.gray.com/  Enter J697 in the search box to learn more about \"Weeks 26 to 30 of Your Pregnancy: Care Instructions. \"  Current as of: 2021               Content Version: 13.2  © 8391-1853 [employer]. Care instructions adapted under license by [employer] (which disclaims liability or warranty for this information).  If you have questions about a medical condition or this instruction, always ask your healthcare professional. [address] any warranty or liability for your use of this information. Vaccine Information Statement    Tdap (Tetanus, Diphtheria, Pertussis) Vaccine: What You Need to Know     Many vaccine information statements are available in Romansh and other languages. See www.immunize.org/vis. Hojas de información sobre vacunas están disponibles en español y en muchos otros idiomas. Visite www.immunize.org/vis. 1. Why get vaccinated? Tdap vaccine can prevent tetanus, diphtheria, and pertussis. Diphtheria and pertussis spread from person to person. Tetanus enters the body through cuts or wounds.  TETANUS (T) causes painful stiffening of the muscles. Tetanus can lead to serious health problems, including being unable to open the mouth, having trouble swallowing and breathing, or death.  DIPHTHERIA (D) can lead to difficulty breathing, heart failure, paralysis, or death.  PERTUSSIS (aP), also known as whooping cough, can cause uncontrollable, violent coughing that makes it hard to breathe, eat, or drink. Pertussis can be extremely serious especially in babies and young children, causing pneumonia, convulsions, brain damage, or death. In teens and adults, it can cause weight loss, loss of bladder control, passing out, and rib fractures from severe coughing. 2. Tdap vaccine     Tdap is only for children 7 years and older, adolescents, and adults. Adolescents should receive a single dose of Tdap, preferably at age 6 or 15 years. Pregnant people should get a dose of Tdap during every pregnancy, preferably during the early part of the third trimester, to help protect the  from pertussis. Infants are most at risk for severe, life-threatening complications from pertussis. Adults who have never received Tdap should get a dose of Tdap.       Also, adults should receive a booster dose of either Tdap or Td (a different vaccine that protects against tetanus and diphtheria but not pertussis) every 10 years, or after 5 years in the case of a severe or dirty wound or burn. Tdap may be given at the same time as other vaccines. 3. Talk with your health care provider    Tell your vaccination provider if the person getting the vaccine:   Has had an allergic reaction after a previous dose of any vaccine that protects against tetanus, diphtheria, or pertussis, or has any severe, life-threatening allergies    Has had a coma, decreased level of consciousness, or prolonged seizures within 7 days after a previous dose of any pertussis vaccine (DTP, DTaP, or Tdap)   Has seizures or another nervous system problem   Has ever had Guillain-Barré Syndrome (also called GBS)   Has had severe pain or swelling after a previous dose of any vaccine that protects against tetanus or diphtheria    In some cases, your health care provider may decide to postpone Tdap vaccination until a future visit. People with minor illnesses, such as a cold, may be vaccinated. People who are moderately or severely ill should usually wait until they recover before getting Tdap vaccine. Your health care provider can give you more information. 4. Risks of a vaccine reaction     Pain, redness, or swelling where the shot was given, mild fever, headache, feeling tired, and nausea, vomiting, diarrhea, or stomachache sometimes happen after Tdap vaccination. People sometimes faint after medical procedures, including vaccination. Tell your provider if you feel dizzy or have vision changes or ringing in the ears. As with any medicine, there is a very remote chance of a vaccine causing a severe allergic reaction, other serious injury, or death. 5. What if there is a serious problem? An allergic reaction could occur after the vaccinated person leaves the clinic.  If you see signs of a severe allergic reaction (hives, swelling of the face and throat, difficulty breathing, a fast heartbeat, dizziness, or weakness), call 9-1-1 and get the person to the nearest hospital.    For other signs that concern you, call your health care provider. Adverse reactions should be reported to the Vaccine Adverse Event Reporting System (VAERS). Your health care provider will usually file this report, or you can do it yourself. Visit the VAERS website at www.vaers. [facility].gov or call 8-828.144.8651. VAERS is only for reporting reactions, and VAERS staff members do not give medical advice. 6. The National Vaccine Injury Compensation Program    The [facility] Vaccine Injury Compensation Program (VICP) is a federal program that was created to compensate people who may have been injured by certain vaccines. Claims regarding alleged injury or death due to vaccination have a time limit for filing, which may be as short as two years. Visit the VICP website at www.[facility].gov/vaccinecompensation or call 4-406.622.2392 to learn about the program and about filing a claim. 7. How can I learn more?  Ask your health care provider.  Call your local or state health department.  Visit the website of the Food and Drug Administration (FDA) for vaccine package inserts and additional information at www.fda.gov/vaccines-blood-biologics/vaccines.  Contact the Centers for Disease Control and Prevention (CDC):  - Call 9-140.484.5843 (1-800-CDC-INFO) or  - Visit CDCs website at www.cdc.gov/vaccines. Vaccine Information Statement   Tdap (Tetanus, Diphtheria, Pertussis) Vaccine  8/6/2021  42 KADIE Fleming [id number]   Department of Health and Human Services  Centers for Disease Control and Prevention    Office Use Only

## 2022-03-23 NOTE — PROGRESS NOTES
Administered 0.5mL TETANUS, DIPHTHERIA TOXOIDS AND ACELLULAR PERTUSSIS VACCINE (TDAP) per MD order. Patient consent signed by patient. Injection given IM in the right deltoid . Patient tolerated well, no complications, no side effects.     Lot # [id number]  Exp: 11/23/23

## 2022-03-24 LAB
ERYTHROCYTE [DISTWIDTH] IN BLOOD BY AUTOMATED COUNT: 13.3 % (ref 11.7–15.4)
GLUCOSE 1H P 50 G GLC PO SERPL-MCNC: 69 MG/DL (ref 65–139)
HCT VFR BLD AUTO: 31.4 % (ref 34–46.6)
HGB BLD-MCNC: 10.5 G/DL (ref 11.1–15.9)
MCH RBC QN AUTO: 32.2 PG (ref 26.6–33)
MCHC RBC AUTO-ENTMCNC: 33.4 G/DL (ref 31.5–35.7)
MCV RBC AUTO: 96 FL (ref 79–97)
PLATELET # BLD AUTO: 268 X10E3/UL (ref 150–450)
RBC # BLD AUTO: 3.26 X10E6/UL (ref 3.77–5.28)
WBC # BLD AUTO: 12.3 X10E3/UL (ref 3.4–10.8)

## 2022-04-07 ENCOUNTER — TELEPHONE (OUTPATIENT)
Dept: OBGYN CLINIC | Age: 37
End: 2022-04-07

## 2022-04-07 NOTE — TELEPHONE ENCOUNTER
Call received at 8:54am      39year old patient last seen in the office on 3/23/2022      Patient is  30w3d pregnant    [employer] 28 calling to get a dental letter    Letter composed as per MD order and faxed to dental office fax number provided of [phone]      Confirmation received

## 2022-04-07 NOTE — LETTER
4/7/2022 8:57 AM    Ms. Brennen Malave  [address] [place] [address]      To Dental Consultant,    Brennen Malave is in need of dental care. This patient is obstetrically cleared for dental procedures with the following considerations:     1. Please use an abdominal and thyroid shield when dental x-rays. 2.  Broad spectrum Penicillins or Cephalosporins and Tylenol products my be                   used as needed. Tetracyclines, Nsaids and Quinolones are contraindicated                    in pregnancy. 3.  Local Anesthesia without Epinepherine is permissible.                         Sincerely,      Stefano Chow MD

## 2022-04-20 ENCOUNTER — ROUTINE PRENATAL (OUTPATIENT)
Dept: OBGYN CLINIC | Age: 37
End: 2022-04-20
Payer: MEDICAID

## 2022-04-20 VITALS — DIASTOLIC BLOOD PRESSURE: 52 MMHG | SYSTOLIC BLOOD PRESSURE: 100 MMHG | WEIGHT: 125.6 LBS | BODY MASS INDEX: 22.25 KG/M2

## 2022-04-20 DIAGNOSIS — Z34.83 ENCOUNTER FOR SUPERVISION OF OTHER NORMAL PREGNANCY, THIRD TRIMESTER: Primary | ICD-10-CM

## 2022-04-20 DIAGNOSIS — Z34.80 ENCOUNTER FOR SUPERVISION OF OTHER NORMAL PREGNANCY, UNSPECIFIED TRIMESTER: ICD-10-CM

## 2022-04-20 PROCEDURE — 0502F SUBSEQUENT PRENATAL CARE: CPT | Performed by: OBSTETRICS & GYNECOLOGY

## 2022-05-02 ENCOUNTER — HOSPITAL ENCOUNTER (EMERGENCY)
Age: 37
Discharge: HOME | End: 2022-05-02
Attending: OBSTETRICS & GYNECOLOGY | Admitting: OBSTETRICS & GYNECOLOGY
Payer: MEDICAID

## 2022-05-02 VITALS
WEIGHT: 125 LBS | HEIGHT: 63 IN | BODY MASS INDEX: 22.15 KG/M2 | TEMPERATURE: 98.4 F | OXYGEN SATURATION: 100 % | SYSTOLIC BLOOD PRESSURE: 113 MMHG | RESPIRATION RATE: 16 BRPM | DIASTOLIC BLOOD PRESSURE: 64 MMHG | HEART RATE: 71 BPM

## 2022-05-02 PROBLEM — O47.03 FALSE LABOR BEFORE 37 COMPLETED WEEKS OF GESTATION IN THIRD TRIMESTER: Status: ACTIVE | Noted: 2022-05-02

## 2022-05-02 PROBLEM — Z03.71 SUSPECTED PROBLEM WITH AMNIOTIC CAVITY AND MEMBRANE NOT FOUND: Status: ACTIVE | Noted: 2022-05-02

## 2022-05-02 PROBLEM — Z3A.34 34 WEEKS GESTATION OF PREGNANCY: Status: ACTIVE | Noted: 2022-05-02

## 2022-05-02 LAB
A1 MICROGLOB PLACENTAL VAG QL: NEGATIVE
CONTROL LINE PRESENT?: NORMAL
DAILY QC (YES/NO)?: YES
EXPIRATION DATE: NORMAL
INTERNAL NEGATIVE CONTROL: NORMAL
KIT LOT NO.: NORMAL
PH, VAGINAL FLUID: 5 (ref 5–6.1)

## 2022-05-02 PROCEDURE — 75810000275 HC EMERGENCY DEPT VISIT NO LEVEL OF CARE

## 2022-05-02 PROCEDURE — 84112 EVAL AMNIOTIC FLUID PROTEIN: CPT | Performed by: OBSTETRICS & GYNECOLOGY

## 2022-05-02 PROCEDURE — 83986 ASSAY PH BODY FLUID NOS: CPT | Performed by: OBSTETRICS & GYNECOLOGY

## 2022-05-02 PROCEDURE — 2709999900 HC NON-CHARGEABLE SUPPLY

## 2022-05-02 PROCEDURE — 99285 EMERGENCY DEPT VISIT HI MDM: CPT

## 2022-05-03 NOTE — DISCHARGE INSTRUCTIONS
Patient Education   Patient Education        Weeks 32 to 29 of Your Pregnancy: Care Instructions  Overview     During the last few weeks of your pregnancy, you may have more aches and pains. It's important to rest when you can. Your growing baby is putting more pressure on your bladder. So you may need to urinate more often. Hemorrhoids are also common. These are painful, itchy veins in the rectal area. You may want to talk with your doctor about banking your baby's umbilical cord blood. This is the blood left in the cord after birth. If you want to save this blood, you must arrange it ahead of time. You can't decide at the last minute. If you haven't already had the Tdap shot during this pregnancy, talk to your doctor about getting it. It will help protect your  against pertussis infection. Follow-up care is a key part of your treatment and safety. Be sure to make and go to all appointments, and call your doctor if you are having problems. It's also a good idea to know your test results and keep a list of the medicines you take. How can you care for yourself at home? Ease hemorrhoids  · Get more liquids, fruits, vegetables, and fiber in your diet. This will help keep your stools soft. · Avoid sitting for too long. Lie on your left side several times a day. · Clean yourself with soft, moist toilet paper. Or you can use witch hazel pads or personal hygiene pads. · If you are uncomfortable, try ice packs. Or you can sit in a warm sitz bath. Do these for 20 minutes at a time, as needed. · Use hydrocortisone cream for pain and itching. Two examples are Anusol and Preparation H Hydrocortisone. · Ask your doctor about taking an over-the-counter stool softener. Consider breastfeeding  · Experts recommend breastfeeding for 1 year or longer. · Breast milk may help protect your child from some health problems.   babies are less likely than formula-fed babies to:  ? Get ear infections, colds, diarrhea, and pneumonia. ? Be obese or get diabetes later in life. · Breastfeeding causes the release of a hormone called oxytocin. This hormone may help your uterus shrink back faster. · Breastfeeding may help you lose weight faster. Making milk burns calories. · Breastfeeding can lower your risk of breast cancer, ovarian cancer, and osteoporosis. Decide about circumcision for your baby  · As you make this decision, it may help to think about your personal, Spiritism, and family traditions. You get to decide if you will keep your baby's penis natural or if your baby will be circumcised. · If you decide that you would like to have your baby circumcised, talk with your doctor. You can share your concerns about pain. And you can discuss your preferences for anesthesia. Where can you learn more? Go to http://www.santana.com/  Enter X711 in the search box to learn more about \"Weeks 32 to 34 of Your Pregnancy: Care Instructions. \"  Current as of: June 16, 2021               Content Version: 13.2  © 2006-2022 [employer]. Care instructions adapted under license by [employer] (which disclaims liability or warranty for this information). If you have questions about a medical condition or this instruction, always ask your healthcare professional. [place] [address] any warranty or liability for your use of this information. Counting Your Baby's Kicks: Care Instructions  Overview     Counting your baby's kicks is one way your doctor can tell that your baby is healthy. Most women--especially in a first pregnancy--feel their baby move for the first time between 16 and 22 weeks. The movement may feel like flutters rather than kicks. Your baby may move more at certain times of the day. When you are active, you may notice less kicking than when you are resting. At your prenatal visits, your doctor will ask whether the baby is active.   In your last trimester, your doctor may ask you to count the number of times you feel your baby move. Follow-up care is a key part of your treatment and safety. Be sure to make and go to all appointments, and call your doctor if you are having problems. It's also a good idea to know your test results and keep a list of the medicines you take. How do you count fetal kicks? · A common method of checking your baby's movement is to note the length of time it takes to count ten movements (such as kicks, flutters, or rolls). · Pick your baby's most active time of day to count. This may be any time from morning to evening. · If you don't feel 10 movements in an hour, have something to eat or drink and count for another hour. If you don't feel at least 10 movements in the 2-hour period, call your doctor. When should you call for help? Call your doctor now or seek immediate medical care if:    · You noticed that your baby has stopped moving or is moving much less than normal.   Watch closely for changes in your health, and be sure to contact your doctor if you have any problems. Where can you learn more? Go to http://mingo-kerri.info/  Enter [id number] in the search box to learn more about \"Counting Your Baby's Kicks: Care Instructions. \"  Current as of: June 16, 2021               Content Version: 13.2  © 0699-4361 [employer]. Care instructions adapted under license by [employer] (which disclaims liability or warranty for this information). If you have questions about a medical condition or this instruction, always ask your healthcare professional. [place] [address] any warranty or liability for your use of this information.

## 2022-05-03 NOTE — PROGRESS NOTES
1918: Pt arrived to unit c/o LOF. 1930: RN at bedside performing nitrazine swab. Negative results with no fluid noted upon examination. 1932: Amnisure swab performed. 1942: Amnisure results negative. 2002: MD at bedside for evaluation. Discharge orders received. 2009: Discharge instructions given. Pt voiced understanding. 2010: Pt discharged.

## 2022-05-03 NOTE — H&P
History & Physical    Name: Zoey Avalos MRN: [id number]  SSN: [id number]    YOB: 1985  Age: 39 y.o. Sex: female        Subjective:   Chief Complaint: Leaking fluid per vagina  Estimated Date of Delivery: 22  OB History    Para Term  AB Living   8 5 3 2 2 5   SAB IAB Ectopic Molar Multiple Live Births     2     0 5      # Outcome Date GA Lbr Keith/2nd Weight Sex Delivery Anes PTL Lv   8 Current            7  20 34w4d  2.1 kg F CS-LTranv Spinal Y SHO      Complications: Other (comment),  premature rupture of membranes (PPROM) with unknown onset of labor   6 Term 18 38w5d  2.975 kg F CS-LTranv SPINAL AN N SHO   5 Term 06/15/12    M  CL Spinal N SHO   4  09/10/08 35w0d  2.211 kg F  CL Spinal Y SHO   3 Term 06 40w0d 10:00 2.863 kg F VAGINAL DELI EPI N SHO   2 IAB            1 IAB                Ms. Rodriguez [place]  presents at 34w0d complaining of leaking fluid. She has felt some leakage fo fluid for a day. She had a gush tonight, and the leakage has quit. She is now not having contractions. Prenatal course was complicated by opiate use disorder. Please see prenatal records for details. Past Medical History:   Diagnosis Date    Abnormal Pap smear 11    LGSIL + HPV    Abnormal Papanicolaou smear of cervix     ADHD (attention deficit hyperactivity disorder)     Asthma     Atypical squamous cells of undetermined significance (ASCUS) on Papanicolaou smear of cervix 22, 2020    Cervical high risk HPV (human papillomavirus) test positive 22, 2020,2018    HPV test positive 11    Low grade squamous intraepithelial lesion (LGSIL) on Papanicolaou smear of cervix 2018    Postpartum depression     Psychiatric problem     drug addiction- opiates, initial dependency years ago. .methadone [place]    Trichomonas infection 2014     Past Surgical History:   Procedure Laterality Date    HX COLPOSCOPY  2020    no bx due to pregnancy    HX DILATION AND CURETTAGE      HX TONSILLECTOMY      HX WISDOM TEETH EXTRACTION      AK  DELIVERY ONLY       Social History     Occupational History    Not on file   Tobacco Use    Smoking status: Current Every Day Smoker     Packs/day: 1.00     Years: 15.00     Pack years: 15.00     Types: Cigarettes     Start date: 10/1/2004    Smokeless tobacco: Never Used    Tobacco comment: Want to quit but find it EXTREMELY HARD   Substance and Sexual Activity    Alcohol use: Not Currently     Alcohol/week: 0.0 standard drinks    Drug use: Yes     Types: Other, Cocaine, Heroin     Comment: subutex    Sexual activity: Yes     Partners: Male     Birth control/protection: None     Family History   Problem Relation Age of Onset    Drug Abuse Mother     Migraines Mother     Drug Abuse Father     OSTEOARTHRITIS Maternal Grandmother     Asthma Brother        Allergies   Allergen Reactions    Cephalosporins Hives    Codeine Hives    Pcn [Penicillins] Hives     Prior to Admission medications    Medication Sig Start Date End Date Taking? Authorizing Provider   buprenorphine HCl (SUBUTEX) 8 mg sublingual tablet 8 mg by SubLINGual route three (3) times daily. Indications: prevention of relapse to opioid dependence   Yes Provider, Historical   PNV No12-Iron-FA-DSS-OM-3 29 mg iron-1 mg -50 mg CPKD Take  by mouth. Yes Provider, Historical   amphetamine-dextroamphetamine (ADDERALL) 20 mg tablet Take 30 mg by mouth two (2) times a day. Indications: attention deficit disorder with hyperactivity   Yes Provider, Historical   ondansetron (ZOFRAN ODT) 8 mg disintegrating tablet Take 1 Tablet by mouth every eight (8) hours as needed for Nausea.  12/10/21   Adolfo Bravo MD   albuterol (PROVENTIL HFA, VENTOLIN HFA, PROAIR HFA) 90 mcg/actuation inhaler Take 1-2 Puffs by inhalation every four (4) hours as needed for Wheezing. 19   Danae Joseph NP ROS       Objective:     Vitals:  Visit Vitals  /64 (BP 1 Location: Right upper arm, BP Patient Position: At rest)   Pulse 71   Temp 98.4 °F (36.9 °C)   Resp 16   Ht 5' 3\" (1.6 m)   Wt 56.7 kg (125 lb)   SpO2 100%   BMI 22.14 kg/m²       Physical Exam:      General:   39 y.o.  female who appears to be in no acute distress. HEENT:   Normocephalic. Pupils are equal, round, and reactive to light. Extraocular movements are intact. Pharynx is clear. Neck:   Normal range of motion. No thyromegaly. Heart:   Regular rate and rhythm. No murmurs present. Lungs:   Clear, no rales, rhonchi, or wheezes. Abdomen:   Soft, gravid, not tender, normal bowel sounds. No CVA tenderness     Uterine Activity:    Frequency: None  Fetal Heart Rate:     Baseline: 135 bpm    Variability: Moderate    Accelerations: Present (15 x 15 bpm)    Decelerations: None  Category: 1  Pelvic:    Membranes: Intact  Extremites:   Trace bilateral pedal edema. Full range of motion. Neuro:    Alert. Oriented. CN 2 - 12 intact. Motor and sensory exam grossly normal.  Nitrazine and Amnisure are both negative. Perineum is dry. Prenatal Labs   Lab Results   Component Value Date/Time    ABO/Rh(D) A POSITIVE 2020 11:25 AM    Rubella, External 2.73-Immune 2020 12:00 AM    GrBStrep, External Negative 2018 12:00 AM    HBsAg, External Negative 2020 12:00 AM    HIV, External Negative 2020 12:00 AM    RPR, External NR 2011 12:00 AM    Gonorrhea, External Negative (on pap) 2022 12:00 AM    Chlamydia, External Negative (on pap) 2022 12:00 AM    ABO,Rh A POS 2020 12:00 AM       No results found for this or any previous visit (from the past 12 hour(s)).       Assessment/Plan:     Active Hospital Problems    Diagnosis Date Noted    False labor before 37 completed weeks of gestation in third trimester 2022     Priority: 1 - One    Suspected problem with amniotic cavity and membrane not found 05/02/2022     Priority: 2 - Two    34 weeks gestation of pregnancy 05/02/2022     Priority: 3 - Three        Plan:    Suspected problem with amniotic cavity and membrane not found  Membranes intact  Discharge home  Labor warnings      Arielle Milan MD  5/2/2022

## 2022-05-04 ENCOUNTER — ROUTINE PRENATAL (OUTPATIENT)
Dept: OBGYN CLINIC | Age: 37
End: 2022-05-04
Payer: MEDICAID

## 2022-05-04 VITALS — WEIGHT: 127 LBS | BODY MASS INDEX: 22.5 KG/M2 | DIASTOLIC BLOOD PRESSURE: 62 MMHG | SYSTOLIC BLOOD PRESSURE: 106 MMHG

## 2022-05-04 DIAGNOSIS — Z34.83 ENCOUNTER FOR SUPERVISION OF OTHER NORMAL PREGNANCY, THIRD TRIMESTER: Primary | ICD-10-CM

## 2022-05-04 PROCEDURE — 0502F SUBSEQUENT PRENATAL CARE: CPT | Performed by: OBSTETRICS & GYNECOLOGY

## 2022-05-04 NOTE — PROGRESS NOTES
Identified pt with two pt identifiers. Reviewed record in preparation for visit and have obtained necessary documentation. All patient medications has been reviewed. Chief Complaint   Patient presents with    Routine Prenatal Visit     Additional information about chief complaint:    Visit Vitals  /62   Wt 127 lb (57.6 kg)   BMI 22.50 kg/m²       Health Maintenance Due   Topic    Hepatitis C Screening        1. Have you been to the ER, urgent care clinic since your last visit? Hospitalized since your last visit? Yes    2. Have you seen or consulted any other health care providers outside of the [address] [place] [address] since your last visit? Include any pap smears or colon screening.  No

## 2022-05-26 ENCOUNTER — ROUTINE PRENATAL (OUTPATIENT)
Dept: OBGYN CLINIC | Age: 37
End: 2022-05-26
Payer: MEDICAID

## 2022-05-26 VITALS — WEIGHT: 128.6 LBS | DIASTOLIC BLOOD PRESSURE: 60 MMHG | BODY MASS INDEX: 22.78 KG/M2 | SYSTOLIC BLOOD PRESSURE: 101 MMHG

## 2022-05-26 DIAGNOSIS — Z34.83 ENCOUNTER FOR SUPERVISION OF OTHER NORMAL PREGNANCY, THIRD TRIMESTER: Primary | ICD-10-CM

## 2022-05-26 PROCEDURE — 0502F SUBSEQUENT PRENATAL CARE: CPT | Performed by: OBSTETRICS & GYNECOLOGY

## 2022-05-26 RX ORDER — NITROFURANTOIN 25; 75 MG/1; MG/1
100 CAPSULE ORAL 2 TIMES DAILY
Qty: 14 CAPSULE | Refills: 0 | Status: SHIPPED | OUTPATIENT
Start: 2022-05-26 | End: 2022-06-09

## 2022-05-27 ENCOUNTER — PATIENT MESSAGE (OUTPATIENT)
Dept: OBGYN CLINIC | Age: 37
End: 2022-05-27

## 2022-05-27 NOTE — TELEPHONE ENCOUNTER
We usually have you take your normal dose of subutex and we will give you rx for Dilaudid for postop pain to last for 5-7 days.

## 2022-05-27 NOTE — TELEPHONE ENCOUNTER
From: Laura Myles  To: Selma Koroma MD  Sent: 5/27/2022 9:40 AM EDT  Subject: Adeola Bui,  Can you tell me the plan for pain management after surgery? I need to let my Subutex doctor know so they know what to write the prescription for.

## 2022-05-31 ENCOUNTER — TELEPHONE (OUTPATIENT)
Dept: OBGYN CLINIC | Age: 37
End: 2022-05-31

## 2022-05-31 ENCOUNTER — HOSPITAL ENCOUNTER (EMERGENCY)
Age: 37
Discharge: HOME | End: 2022-05-31
Attending: OBSTETRICS & GYNECOLOGY | Admitting: OBSTETRICS & GYNECOLOGY
Payer: MEDICAID

## 2022-05-31 VITALS — TEMPERATURE: 98.6 F | BODY MASS INDEX: 23.04 KG/M2 | WEIGHT: 130 LBS | HEIGHT: 63 IN

## 2022-05-31 LAB
A1 MICROGLOB PLACENTAL VAG QL: NEGATIVE
CONTROL LINE PRESENT?: NORMAL
DAILY QC (YES/NO)?: YES
EXPIRATION DATE: NORMAL
INTERNAL NEGATIVE CONTROL: NORMAL
KIT LOT NO.: NORMAL
PH, VAGINAL FLUID: 4.5 (ref 5–6.1)

## 2022-05-31 PROCEDURE — 84112 EVAL AMNIOTIC FLUID PROTEIN: CPT | Performed by: OBSTETRICS & GYNECOLOGY

## 2022-05-31 PROCEDURE — 2709999900 HC NON-CHARGEABLE SUPPLY

## 2022-05-31 PROCEDURE — 99212 OFFICE O/P EST SF 10 MIN: CPT | Performed by: OBSTETRICS & GYNECOLOGY

## 2022-05-31 PROCEDURE — 83986 ASSAY PH BODY FLUID NOS: CPT | Performed by: OBSTETRICS & GYNECOLOGY

## 2022-05-31 NOTE — PROGRESS NOTES
1125 pt arrived to L&D c/o SROM at 1030, denies any ctx or bleeding upon arrival to L&D  1131 nitrazine negative at bedside, no noted fluids pt stated she had one gush and then nothing since  1209 Dr Ivan Elder notified of pts arrived and negative Nitrazine , VO received for [facility]  1221 amniosure test performed at bedside  1227 Dr Ivan Elder at bedside, VO to discharge pt if amniosure was negative, advised 5 more mins til results are final  1238 discharge instructions review with pt and SO, verbalized understanding, advised to keep appt on Thurs with Dr Ivan Elder and to call if any issues or questions.   Copy of instructions signed for chart and copy given to pt  1239 pt left ambulatory with SO

## 2022-05-31 NOTE — H&P
History & Physical    Name: Kenia Yarbrough MRN: [id number]  SSN: [id number]    YOB: 1985  Age: 39 y.o. Sex: female      Subjective:     Reason for Admission:  Pregnancy and possible PROM    History of Present Illness: Kenia Yarbrough is a 39 y.o.  female with an estimated gestational age of 43w4d with Estimated Date of Delivery: 22. Patient complains of one-time vaginal leaking of fluid for one occurrence about 2 hours ago. Pregnancy has been complicated by h/o 4 previous C/S. Patient denies abdominal pain  , contractions and vaginal bleeding . OB History        8    Para   5    Term   3       2    AB   2    Living   5       SAB        IAB   2    Ectopic        Molar        Multiple   0    Live Births   5              Past Medical History:   Diagnosis Date    Abnormal Pap smear 11    LGSIL + HPV    Abnormal Papanicolaou smear of cervix     ADHD (attention deficit hyperactivity disorder)     Asthma     inhaler prn    Atypical squamous cells of undetermined significance (ASCUS) on Papanicolaou smear of cervix 22, 2020    Cervical high risk HPV (human papillomavirus) test positive 22, 2020,2018    HPV test positive 11    Low grade squamous intraepithelial lesion (LGSIL) on Papanicolaou smear of cervix 2018    Postpartum depression     Psychiatric problem     drug addiction- opiates, initial dependency years ago. .methadone [place]    Trichomonas infection 2014     Past Surgical History:   Procedure Laterality Date    HX COLPOSCOPY  2020    no bx due to pregnancy    HX DILATION AND CURETTAGE      HX TONSILLECTOMY      HX WISDOM TEETH EXTRACTION      ME  DELIVERY ONLY       Social History     Occupational History    Not on file   Tobacco Use    Smoking status: Current Every Day Smoker     Packs/day: 1.00     Years: 15.00     Pack years: 15.00     Types: Cigarettes     Start date: 10/1/2004   Taty Guthrie Smokeless tobacco: Never Used    Tobacco comment: Want to quit but find it EXTREMELY HARD   Vaping Use    Vaping Use: Not on file   Substance and Sexual Activity    Alcohol use: Not Currently     Alcohol/week: 0.0 standard drinks    Drug use: Yes     Types: Other, Cocaine, Heroin     Comment: subutex    Sexual activity: Yes     Partners: Male     Birth control/protection: None     Family History   Problem Relation Age of Onset    Drug Abuse Mother     Migraines Mother     Drug Abuse Father     OSTEOARTHRITIS Maternal Grandmother     Asthma Brother        Allergies   Allergen Reactions    Cephalosporins Hives    Codeine Hives    Pcn [Penicillins] Hives     Prior to Admission medications    Medication Sig Start Date End Date Taking? Authorizing Provider   nitrofurantoin, macrocrystal-monohydrate, (Macrobid) 100 mg capsule Take 1 Capsule by mouth two (2) times a day. 5/26/22  Yes Miranda Carter MD   buprenorphine HCl (SUBUTEX) 8 mg sublingual tablet 8 mg by SubLINGual route three (3) times daily. Indications: prevention of relapse to opioid dependence   Yes Provider, Historical   PNV No12-Iron-FA-DSS-OM-3 29 mg iron-1 mg -50 mg CPKD Take  by mouth. Yes Provider, Historical   amphetamine-dextroamphetamine (ADDERALL) 20 mg tablet Take 30 mg by mouth two (2) times a day. Indications: attention deficit disorder with hyperactivity   Yes Provider, Historical   ondansetron (ZOFRAN ODT) 8 mg disintegrating tablet Take 1 Tablet by mouth every eight (8) hours as needed for Nausea.   Patient not taking: Reported on 5/31/2022 12/10/21   Miranda Carter MD   albuterol (PROVENTIL HFA, VENTOLIN HFA, PROAIR HFA) 90 mcg/actuation inhaler Take 1-2 Puffs by inhalation every four (4) hours as needed for Wheezing. 5/27/19   Richio, Darlis Schirmer, NP        Review of Systems    Objective:     Vitals:    Vitals:    05/31/22 1135 05/31/22 1222   Temp:  98.6 °F (37 °C)   Weight: 130 lb (59 kg)    Height: 5' 3\" (1.6 m)       Temp (24hrs), Av.6 °F (37 °C), Min:98.6 °F (37 °C), Max:98.6 °F (37 °C)    No data recorded     Physical Exam  Heart: RRR  Lungs: clear  Abdomen: soft and not tender  Cervical Exam: not checked  Uterine Activity: None  Membranes: Intact per Nitrazine and Amnisure. Fetal Heart Rate: Reactive       Labs:   Recent Results (from the past 24 hour(s))   PH BY NITRAZINE, POC    Collection Time: 22 11:31 AM   Result Value Ref Range    pH-Nitrazine paper 4.5 (A) 5.0 - 6.1    Daily QC performed? Yes    RUPTURE OF FETAL MEMBRANES, POC    Collection Time: 22 12:21 PM   Result Value Ref Range    Rupture of fetal membrane Negative Negative    Control line present? Acceptable     Internal negative control Acceptable     Kit Lot No. [id number]     Expiration date 24        Assessment and Plan:     38 week pregnancy with possible PROM, ruled out. ROM warnings given. Keep appt for 2 days from now.       Signed By:  Vishal Lindsay MD     May 31, 2022

## 2022-05-31 NOTE — DISCHARGE INSTRUCTIONS
[employer] allows you to send messages to your doctor, view your test results, renew your prescriptions, schedule appointments, and more. To sign up, go to [email or web address] and click on the Sign Up Now link in the New User? box. Enter your [facility] Activation Code exactly as it appears below along with the last four digits of your Social Security Number and your Date of Birth to complete the sign-up process. If you do not sign up before the expiration date, you must request a new code. [facility] Activation Code: Activation code not generated  Current [employer] Status: Active    If you have questions, you can e-mail [email or web address]   or call [phone]   to talk to our [address] [facility] staff. Remember, [employer] is NOT to be used for urgent needs. For medical emergencies, dial 911. Patient Education        Week 45 of Your Pregnancy: Care Instructions  Overview     Believe it or not, your baby is almost here. You may have ideas about your baby's personality because of how much your baby moves. Or you may have noticed how your baby responds to sounds, warmth, cold, and light. You may even know what kind of music your baby likes. By now, you have a better idea of what to expect during delivery. You may have talked about your birth preferences with your doctor. But even if you want a vaginal birth, it's a good idea to learn about  births.  birth means that your baby is born through a cut (incision) in your lower belly. In some cases it may be the best choice for the health of you and your baby. Follow-up care is a key part of your treatment and safety. Be sure to make and go to all appointments, and call your doctor if you are having problems. It's also a good idea to know your test results and keep a list of the medicines you take. How can you care for yourself at home? Learn about  birth  · Most C-sections are unplanned.  They are done because of problems that occur during labor. These problems might include:  ? Labor that slows or stops. ? High blood pressure or other problems for you.  ? Signs of distress in your baby. These signs may include a very fast or slow heart rate. · Although you and your baby are likely do well after a , it is major surgery. It has more risks than a vaginal delivery. · In some cases, a planned  may be safer than a vaginal delivery. This may be the case if:  ? You have a health problem, such as a heart condition. ? Your baby isn't in a head-down position for delivery. This is called a breech position. ? The uterus has scars from past surgeries. This could increase the chance of a tear in the uterus. ? There is a problem with the placenta.  ? You have an infection, such as genital herpes, that could be spread to your baby. ? You are having twins or more. ? Your baby weighs 9 to 10 pounds or more. · Because of the risks of a , planned C-sections generally should be done only for medical reasons. And a planned  should be done at 39 weeks or later unless there is a medical reason to do it sooner. Know what to expect after delivery, and plan for the first few weeks at home  · You, your baby, and your partner or  will get identification bands. Only people with matching bands can  the baby from the nursery. · You will learn how to feed, diaper, and bathe your baby. And you will learn how to care for the umbilical cord stump. If your baby will be circumcised, you will also learn how to care for that. · Ask people to wait to visit you until you are at home. And ask them to wash their hands before they touch your baby. · Make sure you have another adult in your home for at least 2 or 3 days after the birth. · During the first 2 weeks, limit when friends and family can visit. · Do not allow visitors who have colds or infections. Make sure all visitors are up to date with their vaccinations.  Never let anyone smoke around your baby. · Try to nap when the baby naps. Be aware of postpartum depression  · \"Baby blues\" are common for the first 1 to 2 weeks after birth. You may cry or feel sad or irritable for no reason. · Sometimes these feelings last longer and are more intense. This is called postpartum depression. · If your symptoms last for more than a few weeks or you feel very depressed, ask your doctor for help. · Postpartum depression can be treated. Support groups and counseling can help. Sometimes medicine can also help. Where can you learn more? Go to http://www.gray.com/  Enter B044 in the search box to learn more about \"Week 38 of Your Pregnancy: Care Instructions. \"  Current as of: June 16, 2021               Content Version: 13.2  © 6985-7094 [employer]. Care instructions adapted under license by [employer] (which disclaims liability or warranty for this information). If you have questions about a medical condition or this instruction, always ask your healthcare professional. [address] any warranty or liability for your use of this information.

## 2022-06-02 ENCOUNTER — ROUTINE PRENATAL (OUTPATIENT)
Dept: OBGYN CLINIC | Age: 37
End: 2022-06-02
Payer: MEDICAID

## 2022-06-02 VITALS — WEIGHT: 125.4 LBS | BODY MASS INDEX: 22.21 KG/M2 | SYSTOLIC BLOOD PRESSURE: 147 MMHG | DIASTOLIC BLOOD PRESSURE: 81 MMHG

## 2022-06-02 DIAGNOSIS — Z34.83 ENCOUNTER FOR SUPERVISION OF OTHER NORMAL PREGNANCY, THIRD TRIMESTER: Primary | ICD-10-CM

## 2022-06-02 PROCEDURE — 0502F SUBSEQUENT PRENATAL CARE: CPT | Performed by: OBSTETRICS & GYNECOLOGY

## 2022-06-06 ENCOUNTER — ANESTHESIA (OUTPATIENT)
Dept: LABOR AND DELIVERY | Age: 37
DRG: 540 | End: 2022-06-06
Payer: MEDICAID

## 2022-06-06 ENCOUNTER — ANESTHESIA EVENT (OUTPATIENT)
Dept: LABOR AND DELIVERY | Age: 37
DRG: 540 | End: 2022-06-06
Payer: MEDICAID

## 2022-06-06 ENCOUNTER — HOSPITAL ENCOUNTER (INPATIENT)
Age: 37
LOS: 3 days | Discharge: HOME | DRG: 540 | End: 2022-06-09
Attending: OBSTETRICS & GYNECOLOGY | Admitting: OBSTETRICS & GYNECOLOGY
Payer: MEDICAID

## 2022-06-06 DIAGNOSIS — G89.18 POSTOPERATIVE PAIN: Primary | ICD-10-CM

## 2022-06-06 PROBLEM — Z98.891 S/P REPEAT LOW TRANSVERSE C-SECTION: Status: ACTIVE | Noted: 2022-06-06

## 2022-06-06 LAB
ABO + RH BLD: NORMAL
B-HEM STREP SPEC QL CULT: NEGATIVE
BASOPHILS # BLD: 0.1 K/UL (ref 0–0.1)
BASOPHILS NFR BLD: 0 % (ref 0–1)
BLOOD GROUP ANTIBODIES SERPL: NORMAL
DIFFERENTIAL METHOD BLD: ABNORMAL
EOSINOPHIL # BLD: 0.2 K/UL (ref 0–0.4)
EOSINOPHIL NFR BLD: 2 % (ref 0–7)
ERYTHROCYTE [DISTWIDTH] IN BLOOD BY AUTOMATED COUNT: 14.3 % (ref 11.5–14.5)
HCT VFR BLD AUTO: 35.7 % (ref 35–47)
HGB BLD-MCNC: 12.4 G/DL (ref 11.5–16)
IMM GRANULOCYTES # BLD AUTO: 0.1 K/UL (ref 0–0.04)
IMM GRANULOCYTES NFR BLD AUTO: 1 % (ref 0–0.5)
LYMPHOCYTES # BLD: 3.2 K/UL (ref 0.8–3.5)
LYMPHOCYTES NFR BLD: 28 % (ref 12–49)
MCH RBC QN AUTO: 32.4 PG (ref 26–34)
MCHC RBC AUTO-ENTMCNC: 34.7 G/DL (ref 30–36.5)
MCV RBC AUTO: 93.2 FL (ref 80–99)
MONOCYTES # BLD: 0.9 K/UL (ref 0–1)
MONOCYTES NFR BLD: 8 % (ref 5–13)
NEUTS SEG # BLD: 7 K/UL (ref 1.8–8)
NEUTS SEG NFR BLD: 61 % (ref 32–75)
NRBC # BLD: 0 K/UL (ref 0–0.01)
NRBC BLD-RTO: 0 PER 100 WBC
PLATELET # BLD AUTO: 228 K/UL (ref 150–400)
PMV BLD AUTO: 10.4 FL (ref 8.9–12.9)
RBC # BLD AUTO: 3.83 M/UL (ref 3.8–5.2)
SPECIMEN EXP DATE BLD: NORMAL
SPECIMEN STATUS REPORT, ROLRST: NORMAL
WBC # BLD AUTO: 11.5 K/UL (ref 3.6–11)

## 2022-06-06 PROCEDURE — 77030040361 HC SLV COMPR DVT MDII -B

## 2022-06-06 PROCEDURE — 65410000002 HC RM PRIVATE OB

## 2022-06-06 PROCEDURE — 36415 COLL VENOUS BLD VENIPUNCTURE: CPT

## 2022-06-06 PROCEDURE — 85025 COMPLETE CBC W/AUTO DIFF WBC: CPT

## 2022-06-06 PROCEDURE — 76010000392 HC C SECN EA ADDL 0.5 HR: Performed by: OBSTETRICS & GYNECOLOGY

## 2022-06-06 PROCEDURE — 65270000029 HC RM PRIVATE

## 2022-06-06 PROCEDURE — 74011250636 HC RX REV CODE- 250/636: Performed by: OBSTETRICS & GYNECOLOGY

## 2022-06-06 PROCEDURE — 76060000078 HC EPIDURAL ANESTHESIA: Performed by: OBSTETRICS & GYNECOLOGY

## 2022-06-06 PROCEDURE — 76010000391 HC C SECN FIRST 1 HR: Performed by: OBSTETRICS & GYNECOLOGY

## 2022-06-06 PROCEDURE — 74011250637 HC RX REV CODE- 250/637: Performed by: OBSTETRICS & GYNECOLOGY

## 2022-06-06 PROCEDURE — 77030018673

## 2022-06-06 PROCEDURE — 75410000003 HC RECOV DEL/VAG/CSECN EA 0.5 HR: Performed by: OBSTETRICS & GYNECOLOGY

## 2022-06-06 PROCEDURE — 74011000250 HC RX REV CODE- 250

## 2022-06-06 PROCEDURE — 59510 CESAREAN DELIVERY: CPT | Performed by: OBSTETRICS & GYNECOLOGY

## 2022-06-06 PROCEDURE — 4A1HXCZ MONITORING OF PRODUCTS OF CONCEPTION, CARDIAC RATE, EXTERNAL APPROACH: ICD-10-PCS | Performed by: OBSTETRICS & GYNECOLOGY

## 2022-06-06 PROCEDURE — 77030007866 HC KT SPN ANES BBMI -B: Performed by: NURSE ANESTHETIST, CERTIFIED REGISTERED

## 2022-06-06 PROCEDURE — 74011250636 HC RX REV CODE- 250/636

## 2022-06-06 PROCEDURE — 74011250636 HC RX REV CODE- 250/636: Performed by: NURSE ANESTHETIST, CERTIFIED REGISTERED

## 2022-06-06 PROCEDURE — 74011000250 HC RX REV CODE- 250: Performed by: NURSE ANESTHETIST, CERTIFIED REGISTERED

## 2022-06-06 PROCEDURE — 77030005513 HC CATH URETH FOL11 MDII -B

## 2022-06-06 PROCEDURE — 74011636637 HC RX REV CODE- 636/637: Performed by: OBSTETRICS & GYNECOLOGY

## 2022-06-06 PROCEDURE — 74011250637 HC RX REV CODE- 250/637: Performed by: ANESTHESIOLOGY

## 2022-06-06 PROCEDURE — 2709999900 HC NON-CHARGEABLE SUPPLY

## 2022-06-06 PROCEDURE — 86900 BLOOD TYPING SEROLOGIC ABO: CPT

## 2022-06-06 RX ORDER — IBUPROFEN 800 MG/1
800 TABLET ORAL
Status: DISCONTINUED | OUTPATIENT
Start: 2022-06-06 | End: 2022-06-09 | Stop reason: HOSPADM

## 2022-06-06 RX ORDER — HYDROMORPHONE HYDROCHLORIDE 2 MG/1
2 TABLET ORAL
Status: DISCONTINUED | OUTPATIENT
Start: 2022-06-06 | End: 2022-06-09 | Stop reason: HOSPADM

## 2022-06-06 RX ORDER — DEXAMETHASONE SODIUM PHOSPHATE 4 MG/ML
INJECTION, SOLUTION INTRA-ARTICULAR; INTRALESIONAL; INTRAMUSCULAR; INTRAVENOUS; SOFT TISSUE AS NEEDED
Status: DISCONTINUED | OUTPATIENT
Start: 2022-06-06 | End: 2022-06-06 | Stop reason: HOSPADM

## 2022-06-06 RX ORDER — IBUPROFEN 200 MG
1 TABLET ORAL DAILY
Status: DISCONTINUED | OUTPATIENT
Start: 2022-06-06 | End: 2022-06-09 | Stop reason: HOSPADM

## 2022-06-06 RX ORDER — SODIUM CHLORIDE, SODIUM LACTATE, POTASSIUM CHLORIDE, CALCIUM CHLORIDE 600; 310; 30; 20 MG/100ML; MG/100ML; MG/100ML; MG/100ML
1000 INJECTION, SOLUTION INTRAVENOUS CONTINUOUS
Status: DISCONTINUED | OUTPATIENT
Start: 2022-06-06 | End: 2022-06-06

## 2022-06-06 RX ORDER — KETOROLAC TROMETHAMINE 30 MG/ML
30 INJECTION, SOLUTION INTRAMUSCULAR; INTRAVENOUS
Status: DISCONTINUED | OUTPATIENT
Start: 2022-06-06 | End: 2022-06-06

## 2022-06-06 RX ORDER — MORPHINE SULFATE 0.5 MG/ML
INJECTION, SOLUTION EPIDURAL; INTRATHECAL; INTRAVENOUS AS NEEDED
Status: DISCONTINUED | OUTPATIENT
Start: 2022-06-06 | End: 2022-06-06 | Stop reason: HOSPADM

## 2022-06-06 RX ORDER — OXYTOCIN/RINGER'S LACTATE 30/500 ML
10 PLASTIC BAG, INJECTION (ML) INTRAVENOUS AS NEEDED
Status: DISCONTINUED | OUTPATIENT
Start: 2022-06-06 | End: 2022-06-09 | Stop reason: HOSPADM

## 2022-06-06 RX ORDER — ZOLPIDEM TARTRATE 5 MG/1
5 TABLET ORAL
Status: DISCONTINUED | OUTPATIENT
Start: 2022-06-06 | End: 2022-06-09 | Stop reason: HOSPADM

## 2022-06-06 RX ORDER — NALBUPHINE HYDROCHLORIDE 10 MG/ML
5 INJECTION, SOLUTION INTRAMUSCULAR; INTRAVENOUS; SUBCUTANEOUS
Status: ACTIVE | OUTPATIENT
Start: 2022-06-06 | End: 2022-06-07

## 2022-06-06 RX ORDER — SODIUM CHLORIDE 0.9 % (FLUSH) 0.9 %
5-40 SYRINGE (ML) INJECTION EVERY 8 HOURS
Status: DISCONTINUED | OUTPATIENT
Start: 2022-06-06 | End: 2022-06-06

## 2022-06-06 RX ORDER — SODIUM CHLORIDE 0.9 % (FLUSH) 0.9 %
5-40 SYRINGE (ML) INJECTION AS NEEDED
Status: DISCONTINUED | OUTPATIENT
Start: 2022-06-06 | End: 2022-06-06

## 2022-06-06 RX ORDER — PROPOFOL 10 MG/ML
INJECTION, EMULSION INTRAVENOUS AS NEEDED
Status: DISCONTINUED | OUTPATIENT
Start: 2022-06-06 | End: 2022-06-06 | Stop reason: HOSPADM

## 2022-06-06 RX ORDER — OXYTOCIN/RINGER'S LACTATE 30/500 ML
87.3 PLASTIC BAG, INJECTION (ML) INTRAVENOUS AS NEEDED
Status: COMPLETED | OUTPATIENT
Start: 2022-06-06 | End: 2022-06-06

## 2022-06-06 RX ORDER — BUPIVACAINE HYDROCHLORIDE 7.5 MG/ML
INJECTION, SOLUTION EPIDURAL; RETROBULBAR AS NEEDED
Status: DISCONTINUED | OUTPATIENT
Start: 2022-06-06 | End: 2022-06-06 | Stop reason: HOSPADM

## 2022-06-06 RX ORDER — DEXTROAMPHETAMINE SACCHARATE, AMPHETAMINE ASPARTATE, DEXTROAMPHETAMINE SULFATE AND AMPHETAMINE SULFATE 2.5; 2.5; 2.5; 2.5 MG/1; MG/1; MG/1; MG/1
30 TABLET ORAL 2 TIMES DAILY
Status: DISCONTINUED | OUTPATIENT
Start: 2022-06-06 | End: 2022-06-09 | Stop reason: HOSPADM

## 2022-06-06 RX ORDER — NALOXONE HYDROCHLORIDE 0.4 MG/ML
0.4 INJECTION, SOLUTION INTRAMUSCULAR; INTRAVENOUS; SUBCUTANEOUS AS NEEDED
Status: DISCONTINUED | OUTPATIENT
Start: 2022-06-06 | End: 2022-06-09 | Stop reason: HOSPADM

## 2022-06-06 RX ORDER — IBUPROFEN 800 MG/1
800 TABLET ORAL EVERY 8 HOURS
Status: DISCONTINUED | OUTPATIENT
Start: 2022-06-07 | End: 2022-06-09 | Stop reason: HOSPADM

## 2022-06-06 RX ORDER — BUPRENORPHINE 2 MG/1
8 TABLET SUBLINGUAL 3 TIMES DAILY
Status: DISCONTINUED | OUTPATIENT
Start: 2022-06-06 | End: 2022-06-07

## 2022-06-06 RX ORDER — SIMETHICONE 80 MG
80 TABLET,CHEWABLE ORAL AS NEEDED
Status: DISCONTINUED | OUTPATIENT
Start: 2022-06-06 | End: 2022-06-09 | Stop reason: HOSPADM

## 2022-06-06 RX ORDER — OXYTOCIN/RINGER'S LACTATE 30/500 ML
87.3 PLASTIC BAG, INJECTION (ML) INTRAVENOUS AS NEEDED
Status: DISCONTINUED | OUTPATIENT
Start: 2022-06-06 | End: 2022-06-09 | Stop reason: HOSPADM

## 2022-06-06 RX ORDER — FENTANYL CITRATE 50 UG/ML
INJECTION, SOLUTION INTRAMUSCULAR; INTRAVENOUS AS NEEDED
Status: DISCONTINUED | OUTPATIENT
Start: 2022-06-06 | End: 2022-06-06 | Stop reason: HOSPADM

## 2022-06-06 RX ORDER — OXYCODONE HYDROCHLORIDE 5 MG/1
10 TABLET ORAL
Status: DISCONTINUED | OUTPATIENT
Start: 2022-06-06 | End: 2022-06-06

## 2022-06-06 RX ORDER — ADHESIVE BANDAGE
30 BANDAGE TOPICAL DAILY PRN
Status: DISCONTINUED | OUTPATIENT
Start: 2022-06-06 | End: 2022-06-09 | Stop reason: HOSPADM

## 2022-06-06 RX ORDER — SODIUM CHLORIDE, SODIUM LACTATE, POTASSIUM CHLORIDE, CALCIUM CHLORIDE 600; 310; 30; 20 MG/100ML; MG/100ML; MG/100ML; MG/100ML
INJECTION, SOLUTION INTRAVENOUS
Status: DISCONTINUED | OUTPATIENT
Start: 2022-06-06 | End: 2022-06-06 | Stop reason: HOSPADM

## 2022-06-06 RX ORDER — ACETAMINOPHEN 325 MG/1
650 TABLET ORAL
Status: DISCONTINUED | OUTPATIENT
Start: 2022-06-06 | End: 2022-06-09 | Stop reason: HOSPADM

## 2022-06-06 RX ORDER — ONDANSETRON 2 MG/ML
INJECTION INTRAMUSCULAR; INTRAVENOUS AS NEEDED
Status: DISCONTINUED | OUTPATIENT
Start: 2022-06-06 | End: 2022-06-06 | Stop reason: HOSPADM

## 2022-06-06 RX ORDER — FAMOTIDINE 10 MG/ML
INJECTION INTRAVENOUS AS NEEDED
Status: DISCONTINUED | OUTPATIENT
Start: 2022-06-06 | End: 2022-06-06 | Stop reason: HOSPADM

## 2022-06-06 RX ORDER — CLINDAMYCIN PHOSPHATE 900 MG/50ML
900 INJECTION INTRAVENOUS
Status: COMPLETED | OUTPATIENT
Start: 2022-06-06 | End: 2022-06-06

## 2022-06-06 RX ORDER — SODIUM CHLORIDE 0.9 % (FLUSH) 0.9 %
5-40 SYRINGE (ML) INJECTION EVERY 8 HOURS
Status: DISCONTINUED | OUTPATIENT
Start: 2022-06-06 | End: 2022-06-09 | Stop reason: HOSPADM

## 2022-06-06 RX ORDER — DIPHENHYDRAMINE HCL 25 MG
25 CAPSULE ORAL
Status: DISCONTINUED | OUTPATIENT
Start: 2022-06-06 | End: 2022-06-09 | Stop reason: HOSPADM

## 2022-06-06 RX ORDER — HYDROMORPHONE HYDROCHLORIDE 2 MG/ML
1 INJECTION, SOLUTION INTRAMUSCULAR; INTRAVENOUS; SUBCUTANEOUS
Status: DISCONTINUED | OUTPATIENT
Start: 2022-06-06 | End: 2022-06-06

## 2022-06-06 RX ORDER — OXYTOCIN/RINGER'S LACTATE 30/500 ML
10 PLASTIC BAG, INJECTION (ML) INTRAVENOUS AS NEEDED
Status: DISCONTINUED | OUTPATIENT
Start: 2022-06-06 | End: 2022-06-06

## 2022-06-06 RX ORDER — OXYCODONE HYDROCHLORIDE 5 MG/1
5 TABLET ORAL
Status: DISCONTINUED | OUTPATIENT
Start: 2022-06-06 | End: 2022-06-06

## 2022-06-06 RX ORDER — SODIUM CHLORIDE, SODIUM LACTATE, POTASSIUM CHLORIDE, CALCIUM CHLORIDE 600; 310; 30; 20 MG/100ML; MG/100ML; MG/100ML; MG/100ML
125 INJECTION, SOLUTION INTRAVENOUS CONTINUOUS
Status: DISCONTINUED | OUTPATIENT
Start: 2022-06-06 | End: 2022-06-09 | Stop reason: HOSPADM

## 2022-06-06 RX ORDER — SODIUM CHLORIDE 0.9 % (FLUSH) 0.9 %
5-40 SYRINGE (ML) INJECTION AS NEEDED
Status: DISCONTINUED | OUTPATIENT
Start: 2022-06-06 | End: 2022-06-09 | Stop reason: HOSPADM

## 2022-06-06 RX ORDER — HYDROMORPHONE HYDROCHLORIDE 1 MG/ML
1 INJECTION, SOLUTION INTRAMUSCULAR; INTRAVENOUS; SUBCUTANEOUS
Status: DISCONTINUED | OUTPATIENT
Start: 2022-06-06 | End: 2022-06-06

## 2022-06-06 RX ADMIN — IBUPROFEN 800 MG: 800 TABLET, FILM COATED ORAL at 16:49

## 2022-06-06 RX ADMIN — SODIUM CHLORIDE, POTASSIUM CHLORIDE, SODIUM LACTATE AND CALCIUM CHLORIDE: 600; 310; 30; 20 INJECTION, SOLUTION INTRAVENOUS at 09:30

## 2022-06-06 RX ADMIN — SODIUM CHLORIDE, POTASSIUM CHLORIDE, SODIUM LACTATE AND CALCIUM CHLORIDE: 600; 310; 30; 20 INJECTION, SOLUTION INTRAVENOUS at 09:36

## 2022-06-06 RX ADMIN — BUPIVACAINE HYDROCHLORIDE 1.6 ML: 7.5 INJECTION, SOLUTION EPIDURAL; RETROBULBAR at 09:44

## 2022-06-06 RX ADMIN — FAMOTIDINE 20 MG: 10 INJECTION INTRAVENOUS at 09:39

## 2022-06-06 RX ADMIN — PROPOFOL 20 MG: 10 INJECTION, EMULSION INTRAVENOUS at 10:37

## 2022-06-06 RX ADMIN — SODIUM CHLORIDE, SODIUM LACTATE, POTASSIUM CHLORIDE, AND CALCIUM CHLORIDE: 600; 310; 30; 20 INJECTION, SOLUTION INTRAVENOUS at 09:54

## 2022-06-06 RX ADMIN — PROPOFOL 50 MG: 10 INJECTION, EMULSION INTRAVENOUS at 10:27

## 2022-06-06 RX ADMIN — Medication 500 ML/HR: at 10:11

## 2022-06-06 RX ADMIN — OXYCODONE 10 MG: 5 TABLET ORAL at 15:24

## 2022-06-06 RX ADMIN — ACETAMINOPHEN 650 MG: 325 TABLET ORAL at 21:58

## 2022-06-06 RX ADMIN — DEXTROAMPHETAMINE SACCHARATE, AMPHETAMINE ASPARTATE, DEXTROAMPHETAMINE SULFATE, AMPHETAMINE SULFATE TABLETS, 10 MG,CLL 30 MG: 2.5; 2.5; 2.5; 2.5 TABLET ORAL at 13:06

## 2022-06-06 RX ADMIN — BUPRENORPHINE HCL 8 MG: 2 TABLET SUBLINGUAL at 16:02

## 2022-06-06 RX ADMIN — BUPRENORPHINE HCL 8 MG: 2 TABLET SUBLINGUAL at 13:06

## 2022-06-06 RX ADMIN — ONDANSETRON HYDROCHLORIDE 4 MG: 2 SOLUTION INTRAMUSCULAR; INTRAVENOUS at 09:38

## 2022-06-06 RX ADMIN — CLINDAMYCIN PHOSPHATE 900 MG: 900 INJECTION, SOLUTION INTRAVENOUS at 09:47

## 2022-06-06 RX ADMIN — BUPRENORPHINE HCL 8 MG: 2 TABLET SUBLINGUAL at 21:53

## 2022-06-06 RX ADMIN — PROPOFOL 20 MG: 10 INJECTION, EMULSION INTRAVENOUS at 10:33

## 2022-06-06 RX ADMIN — SODIUM CHLORIDE, POTASSIUM CHLORIDE, SODIUM LACTATE AND CALCIUM CHLORIDE 125 ML/HR: 600; 310; 30; 20 INJECTION, SOLUTION INTRAVENOUS at 12:32

## 2022-06-06 RX ADMIN — DEXTROAMPHETAMINE SACCHARATE, AMPHETAMINE ASPARTATE, DEXTROAMPHETAMINE SULFATE, AMPHETAMINE SULFATE TABLETS, 10 MG,CLL 30 MG: 2.5; 2.5; 2.5; 2.5 TABLET ORAL at 17:49

## 2022-06-06 RX ADMIN — PROPOFOL 20 MG: 10 INJECTION, EMULSION INTRAVENOUS at 10:35

## 2022-06-06 RX ADMIN — HYDROMORPHONE HYDROCHLORIDE 1 MG: 1 INJECTION, SOLUTION INTRAMUSCULAR; INTRAVENOUS; SUBCUTANEOUS at 11:29

## 2022-06-06 RX ADMIN — ACETAMINOPHEN 650 MG: 325 TABLET ORAL at 15:24

## 2022-06-06 RX ADMIN — FENTANYL CITRATE 10 MCG: 50 INJECTION, SOLUTION INTRAMUSCULAR; INTRAVENOUS at 09:44

## 2022-06-06 RX ADMIN — DEXAMETHASONE SODIUM PHOSPHATE 4 MG: 4 INJECTION, SOLUTION INTRAMUSCULAR; INTRAVENOUS at 10:17

## 2022-06-06 RX ADMIN — MORPHINE SULFATE 250 MCG: 0.5 INJECTION, SOLUTION EPIDURAL; INTRATHECAL; INTRAVENOUS at 09:44

## 2022-06-06 RX ADMIN — HYDROMORPHONE HYDROCHLORIDE 2 MG: 2 TABLET ORAL at 20:26

## 2022-06-06 NOTE — ANESTHESIA PROCEDURE NOTES
Spinal Block    Start time: 6/6/2022 9:41 AM  End time: 6/6/2022 9:44 AM  Performed by: Latha Morris CRNA  Authorized by: Latha Morris CRNA     Pre-procedure: Indications: primary anesthetic  Preanesthetic Checklist: patient identified, risks and benefits discussed, anesthesia consent, site marked, patient being monitored and timeout performed    Timeout Time: 09:41 EDT          Spinal Block:   Patient Position:  Seated  Prep Region:  Lumbar  Prep: DuraPrep and patient draped      Location:  L3-4  Technique:  Single shot        Needle:   Needle Type:  Pencan  Needle Gauge:  25 G  Attempts:  1      Events: CSF confirmed, no blood with aspiration and no paresthesia        Assessment:  Insertion:  Uncomplicated  Patient tolerance:  Patient tolerated the procedure well with no immediate complications  Easily placed on first pass; neg heme, neg paresthesia, +CSF w NEAL. Pt tolerated v well.

## 2022-06-06 NOTE — ANESTHESIA POSTPROCEDURE EVALUATION
Procedure(s):   SECTION. spinal    Anesthesia Post Evaluation      Multimodal analgesia: multimodal analgesia used between 6 hours prior to anesthesia start to PACU discharge  Patient location during evaluation: PACU  Patient participation: complete - patient participated  Level of consciousness: awake and alert  Pain management: adequate  Airway patency: patent  Anesthetic complications: no  Cardiovascular status: acceptable and stable  Respiratory status: acceptable and unassisted  Hydration status: acceptable  Comments: The patient was seen and evaluated in the post-operative period. The time of my evaluation may not match the time of this note. The patient denied uncontrolled pain or nausea, and there were no significant complications evident. Kev Salcedo MD      Post anesthesia nausea and vomiting:  none  Final Post Anesthesia Temperature Assessment:  Normothermia (36.0-37.5 degrees C)      INITIAL Post-op Vital signs:   Vitals Value Taken Time   /64 22 1120   Temp 35.5 °C (95.9 °F) 22 1054   Pulse 66 22 1120   Resp 14 22 1054   SpO2 99 % 22 1130   Vitals shown include unvalidated device data.

## 2022-06-06 NOTE — OP NOTES
Section Delivery Procedure Note         Name: Alma Delia Lincoln      Medical Record Number: [id number]      YOB: 1985     Today's Date: 2022      Preoperative Diagnosis: Previous  delivery affecting pregnancy [O34.219]    Postoperative Diagnosis: same    Procedure: Low Cervical Transverse Procedure(s):   SECTION    Surgeon:  Batsheva Mario MD     Assistant:  Staff    Anesthesia: Spinal    Prophylactic Antibiotics: clindamycin      Fetal Description: balderrama male    Birth Information:   Information for the patient's :  Samuel Ortiz, Male  Love Castro [[id number]]          Umbilical Cord: normal    Placenta:  manual    Specimens:   ID Type Source Tests Collected by Time Destination   1 :     Adolfo Bravo MD 2022 1019 Discarded        Implants:  None           Complications:  none    EBL: 450    Procedure Details: The patient was prepped with alcohol and draped with ioban in the supine position with a spinal and (on closing) general anesthetic. Brooks catheter had been placed using sterile technique. A Pfannenstiel incision was made, excising her old skin scar. The fascia was divided and then the rectus muscles were split in the midline. The peritoneum was entered well above the bladder without difficulty. The peritoneum over the lower uterine segment was incised and the bladder flap was developed. The bladder retractor was placed. The lower uterine segment was extremely thin and the fetal head very low in the pelvis. The uterus was entered sharply at the junction between the very thin lower segment and the much thicker upper segment with a single edge of the Metzenbaum scissors. Amniotomy was performed, the fluid was small amount clear. The infant was then delivered to chest level and the mouth and nares were suctioned. The remainder of the infant was delivered. The cord was clamped and cut and the infant handed off to the nursery staff.     The placenta was delivered manually; it was posterior, normal and intact. It was not sent to pathology. The uterus was cleared of blood, fluid, clot, and membranes and involuted with IV Pitocin given by anesthesia. The uterus was exteriorized and closed with a running interlocking layer of 1 Monocryl. It was hemostatic after placing a second layer over two thirds of the incision length. The posterior cul-de-sac was cleared of blood and fluid, then the uterus was replaced in the abdominal cavity. The gutters were irrigated with warm saline. The anterior peritoneum and rectus muscles were reapproximated in the midline with a suture of 1 chromic. The fascia was closed from left to right with 1 Stratafix in a running fashion. The subcutaneous space was irrigated after freeing up scar and adhesions, stopping bleeders with the bovie, and then the skin was closed with stainless steel staples. The final sponge and instrument counts were correct. The patient and infant were taken to the recovery room in good condition.     Signed By:  Petra Urbina MD     June 6, 2022

## 2022-06-06 NOTE — PROGRESS NOTES
6/6/2022  10:01 AM    CM met with CHINTAN to complete initial assessment and begin discharge planning. MOB verified and confirmed demographics. CHINTAN lives with HALLIE- Jodie Weiss ( [phone]), along with her children ages: 3,2 at the address on file ( [address] [facility]. [address] [place] [id number]). CHINTAN has 3 other children ages: 16,16,8, who she stated live with their father. CHINTAN is not employed and plans to be home with infant. FOB is employed and will be taking adequate time off. CHINTAN reports she has good family support, and feels like she has the support she needs when she returns home. CHINTAN plans to breast feed baby and has pump to use at home. Dr. Sheldon Devlin will provide follow up care for infant. CHINTAN has car seat, bassinet/crib, clothing, bottles and all necessary supplies for baby. CHINTAN has [employer], and will be adding baby to this policy. CM discussed process to add baby to insurance, MOB verbalized understanding. MOB noted she also has an open case with WIC and will be adding baby to program. CHINTAN is also enrolled in SNAP benefits. CM confirmed with CHINTAN that she is currently on 8mg of Subutex 3x a day during her pregnancy. MOB noted she follows up with [facility], where she receives medication, and counseling/therapy services. CM discuss HATTIE scoring for infant and state report (CPS)  if required per policy. MOB verbalized understanding. CM following for needs. Care Management Interventions  PCP Verified by CM: Yes Macie Silveira)  Mode of Transport at Discharge:  Other (see comment)  Transition of Care Consult (CM Consult): Discharge Planning  Support Systems: Spouse/Significant Other,Other Family Member(s)  Confirm Follow Up Transport: Family  Discharge Location  Patient Expects to be Discharged to[de-identified] Home with family assistance  Jahaira Diana

## 2022-06-06 NOTE — PROGRESS NOTES
0 - Pt c/o incisional pain and requests MD to be called to see if pt can get anymore pain medication. RN had just given 650mg Tynenol po and 10mg roxicodone. RN called Dr Stacie Perez and gave orders to discontinue Roxicodone and give the 2mg dilaudid po and motrin for pain relief. Can give dilaudid 2mg 4 hours after roxicodone was given. 1910 - report given to Saundra Ortez RN in [facility] for continued care at this time.

## 2022-06-06 NOTE — LACTATION NOTE
This note was copied from a baby's chart. Baby's first feed was 15 mls of formula. TOlerated feeding well. Mom states she may wish to blend while she is in hospital.  Mother's feeding choice supported. Skin to skin encouraged to better manage HATTIE (helping to attenuate withdrawal symptoms) and promote good central nervous system regulation, regardless of feeding method. Also educated that active use of other illicit substances are not compatible with breastfeeding. On infant oral anatomy exam - both tight labial frenulum and short anterior/mid frenulum noted. Mom does not wish for breastfeeding assistance at this time. Mom aware she may call for lactation assistance at anytime. Will follow. Discussed with mother her plan for feeding. She acknowledges understanding of information reviewed and states that it is her plan to blendfeed her infant. Will support her choice and offer additional information as needed. Babies who are held skin-to-skin are more stable, physiologically, than their peers who are placed in a warmer after birth. Skin to skin calms and relaxes both mother and baby, regulates the baby's heart rate temperature and breathing, helping them to better adapt to life outside the womb. Skin to skin also stimulates digestion and an interest in feeding. Mother's who practice consistent skin to skin experience more positive breastfeeding, improved breast milk production, and are likely to have reduced postpartum bleeding and lower risk of postpartum depression. Once you are home with your baby, its still beneficial to make this practice a part of your day. Pt taught that Paced Bottle Feeding is a method of bottle feeding that allows the infant to be more in control of the feeding pace. This method slows the flow of milk into the nipple and mouth, allowing the baby to eat more slowly, and take breaks. Paced feeding reduces the risk of overfeeding that may result in discomfort to the baby.  This feeding method is recommended for any baby that receives bottles, whether fully bottle fed, or fed from the breast and a bottle.            Breast Assessment  Left Breast: Medium  Left Nipple: Everted,Intact  Right Breast: Medium  Right Nipple: Everted,Intact  Breast- Feeding Assessment  Breast-Feeding Experience:  (a few times/days with previous children)  Breast Trauma/Surgery: No  Lactation Consultant Visits  Breast-Feedings:  (has not yet  her infant)  Mother/Infant Observation  Mother Observation: Breast comfortable,Recognizes feeding cues  Infant Observation: Frenulum checked,Opens mouth (tight anterior frenulum, tight labial frenulum)

## 2022-06-06 NOTE — ANESTHESIA PREPROCEDURE EVALUATION
Relevant Problems   No relevant active problems       Anesthetic History   No history of anesthetic complications            Review of Systems / Medical History  Patient summary reviewed and pertinent labs reviewed    Pulmonary            Asthma : well controlled       Neuro/Psych         Psychiatric history  Pertinent negatives: No seizures, TIA and CVA   Cardiovascular                Pertinent negatives: No past MI, angina and CHF  Exercise tolerance: >4 METS     GI/Hepatic/Renal  Within defined limits           Pertinent negatives: No renal disease   Endo/Other  Within defined limits        Pertinent negatives: No diabetes   Other Findings   Comments: 4 prior c-sections  Patient took buprenorphine this am  Patient took Adderall this am            Physical Exam    Airway  Mallampati: II  TM Distance: 4 - 6 cm  Neck ROM: normal range of motion   Mouth opening: Normal     Cardiovascular      Rate: normal         Dental    Dentition: Poor dentition     Pulmonary  Breath sounds clear to auscultation               Abdominal  GI exam deferred       Other Findings            Anesthetic Plan    ASA: 2  Anesthesia type: spinal      Post-op pain plan if not by surgeon: intrathecal opiates      Anesthetic plan and risks discussed with: Patient      4 prior c section. No objection to blood transfusion if necessary.  Took adderall and subutex this am.
none

## 2022-06-06 NOTE — H&P
History & Physical    Name: Claude Burow MRN: [id number]  SSN: [id number]    YOB: 1985  Age: 39 y.o. Sex: female        Subjective:     Estimated Date of Delivery: 22  OB History        8    Para   5    Term   3       2    AB   2    Living   5       SAB        IAB   2    Ectopic        Molar        Multiple   0    Live Births   5                Ms. Mcgovern Huang is admitted with pregnancy at 39w0d for repeat  section. Prenatal course was complicated by h/o 4 previous C/S's and Suboxone use during pregnancy. Please see prenatal records for details. Past Medical History:   Diagnosis Date    Abnormal Pap smear 11    LGSIL + HPV    Abnormal Papanicolaou smear of cervix     ADHD (attention deficit hyperactivity disorder)     Asthma     inhaler prn    Atypical squamous cells of undetermined significance (ASCUS) on Papanicolaou smear of cervix 22, 2020    Cervical high risk HPV (human papillomavirus) test positive 22, 2020,2018    HPV test positive 11    Low grade squamous intraepithelial lesion (LGSIL) on Papanicolaou smear of cervix 2018    Postpartum depression     Psychiatric problem     drug addiction- opiates, initial dependency years ago. .methadone [place]    Trichomonas infection 2014     Past Surgical History:   Procedure Laterality Date    HX COLPOSCOPY  2020    no bx due to pregnancy    HX DILATION AND CURETTAGE      HX TONSILLECTOMY      HX WISDOM TEETH EXTRACTION      [place]  DEL[place]       Social History     Occupational History    Not on file   Tobacco Use    Smoking status: Current Every Day Smoker     Packs/day: 1.00     Years: 15.00     Pack years: 15.00     Types: Cigarettes     Start date: 10/1/2004    Smokeless tobacco: Never Used    Tobacco comment: Want to quit but find it EXTREMELY HARD   Vaping Use    Vaping Use: Not on file   Substance and Sexual Activity    Alcohol use: Not Currently     Alcohol/week: 0.0 standard drinks    Drug use: Yes     Types: Other, Cocaine, Heroin     Comment: subutex    Sexual activity: Yes     Partners: Male     Birth control/protection: None     Family History   Problem Relation Age of Onset    Drug Abuse Mother     Migraines Mother     Drug Abuse Father     OSTEOARTHRITIS Maternal Grandmother     Asthma Brother        Allergies   Allergen Reactions    Cephalosporins Hives    Codeine Hives    Pcn [Penicillins] Hives     Prior to Admission medications    Medication Sig Start Date End Date Taking? Authorizing Provider   nitrofurantoin, macrocrystal-monohydrate, (Macrobid) 100 mg capsule Take 1 Capsule by mouth two (2) times a day. 5/26/22   Will Basilio MD   ondansetron (ZOFRAN ODT) 8 mg disintegrating tablet Take 1 Tablet by mouth every eight (8) hours as needed for Nausea. Patient not taking: Reported on 5/31/2022 12/10/21   Will Basilio MD   albuterol (PROVENTIL HFA, VENTOLIN HFA, PROAIR HFA) 90 mcg/actuation inhaler Take 1-2 Puffs by inhalation every four (4) hours as needed for Wheezing. 5/27/19   Murali Underwood, NP   buprenorphine HCl (SUBUTEX) 8 mg sublingual tablet 8 mg by SubLINGual route three (3) times daily. Indications: prevention of relapse to opioid dependence    Provider, Historical   PNV No12-Iron-FA-DSS-OM-3 29 mg iron-1 mg -50 mg CPKD Take  by mouth. Provider, Historical   amphetamine-dextroamphetamine (ADDERALL) 20 mg tablet Take 30 mg by mouth two (2) times a day. Indications: attention deficit disorder with hyperactivity    Provider, Historical        Review of Systems: A comprehensive review of systems was negative except for that written in the HPI. Objective:     Vitals: There were no vitals filed for this visit.      Physical Exam:  Heart: Regular rate and rhythm  Lung: clear to auscultation throughout lung fields, no wheezes, no rales, no rhonchi and normal respiratory effort  Abdomen: soft, nontender  Fundus: soft and non tender  Perineum: blood absent, amniotic fluid absent  Cervical Exam: Closed/Thick/-2  Lower Extremities:  - Edema No  Membranes:  Intact  Fetal Heart Rate: Reactive    Prenatal Labs:   Lab Results   Component Value Date/Time    Rubella, External 2.73-Immune 2020 12:00 AM    GrBStrep, External Negative 2018 12:00 AM    HBsAg, External Negative 2020 12:00 AM    HIV, External Negative 2020 12:00 AM    RPR, External NR 2011 12:00 AM    Gonorrhea, External Negative (on pap) 2022 12:00 AM    Chlamydia, External Negative (on pap) 2022 12:00 AM        Assessment/Plan:     Plan: Admit for repeat  Section. Informed consent was obtained and patient stated she had no further questions. Group B Strep is not back yet--but not relevant.     Signed By:  Luanne Pan MD     2022

## 2022-06-06 NOTE — L&D DELIVERY NOTE
Delivery Summary    Patient: Mortimer Showers MRN: [id number]  SSN: [id number]    YOB: 1985  Age: 39 y.o. Sex: female        Information for the patient's :  Paul Mosqueda [[id number]]       Labor Events:    Labor: No    Steroids: None   Cervical Ripening Date/Time:       Cervical Ripening Type: None   Antibiotics During Labor: No   Rupture Identifier:      Rupture Date/Time: 2022 10:09 AM   Rupture Type: AROM   Amniotic Fluid Volume: Moderate    Amniotic Fluid Description: Clear    Amniotic Fluid Odor: None    Induction: None       Induction Date/Time:        Indications for Induction:      Augmentation: None   Augmentation Date/Time:      Indications for Augmentation:     Labor complications: None       Additional complications:        Delivery Events:  Indications For Episiotomy:     Episiotomy: None   Perineal Laceration(s): None   Repaired:     Periurethral Laceration Location:      Repaired:     Labial Laceration Location:     Repaired:     Sulcal Laceration Location:     Repaired:     Vaginal Laceration Location:     Repaired:     Cervical Laceration Location:     Repaired:     Repair Suture: None   Number of Repair Packets:     Estimated Blood Loss (ml):  450 ml   Quantitaive Blood Loss (ml):             Delivery Date: 2022    Delivery Time: 10:10 AM   Delivery Type: , Low Transverse     Details    Trial of Labor: No   Primary/Repeat: Repeat   Priority: Routine   Indications:          Sex:  Male     Gestational Age: 39w0d  Delivery Clinician:  Amna Hurtado  Living Status: Living   Delivery Location: OR            APGARS  One minute Five minutes Ten minutes   Skin color: 1   1        Heart rate: 2   2        Grimace: 2   2        Muscle tone: 2   2        Breathin   2        Totals: 9   9          Presentation: Vertex    Position:        Resuscitation Method:  Suctioning-bulb; Tactile Stimulation     Meconium Stained: None      Cord Information: 3 Vessels  Complications: None  Cord around:    Delayed cord clamping? No  Cord clamped date/time:2022 10:11 AM  Disposition of Cord Blood: Discard    Blood Gases Sent?: No    Placenta:  Date/Time: 2022 10:11 AM  Removal: Manual Removal      Appearance: Normal     [place] Measurements:  Birth Weight:        Birth Length:        Head Circumference:        Chest Circumference:       Abdominal Girth: Other Providers:   Marco A MARTINEZ;NAIMA MCCARTHY;RAZIA ELIAS;VY BEACH;;GODFREY PATEL;;TERA LAGUNA, Obstetrician;Primary Nurse;Primary [place] Nurse;Scrub Tech; Anesthesiologist;Crna;Nurse Practitioner;Scrub Tech             Group B Strep:   Lab Results   Component Value Date/Time    GrMATEUSZtrep, External Negative 2018 12:00 AM     Information for the patient's :  Chrissyus Wallsart, Paul Chavez [[id number]]   No results found for: ABORH, PCTABR, PCTDIG, BILI, ABORHEXT, ABORH     No results for input(s): PCO2CB, PO2CB, HCO3I, SO2I, IBD, PTEMPI, SPECTI, PHICB, ISITE, IDEV, IALLEN in the last 72 hours.

## 2022-06-07 LAB
BASOPHILS # BLD: 0 K/UL (ref 0–0.1)
BASOPHILS NFR BLD: 0 % (ref 0–1)
DIFFERENTIAL METHOD BLD: ABNORMAL
EOSINOPHIL # BLD: 0.1 K/UL (ref 0–0.4)
EOSINOPHIL NFR BLD: 1 % (ref 0–7)
ERYTHROCYTE [DISTWIDTH] IN BLOOD BY AUTOMATED COUNT: 14 % (ref 11.5–14.5)
HCT VFR BLD AUTO: 31.9 % (ref 35–47)
HGB BLD-MCNC: 10.9 G/DL (ref 11.5–16)
IMM GRANULOCYTES # BLD AUTO: 0.1 K/UL (ref 0–0.04)
IMM GRANULOCYTES NFR BLD AUTO: 1 % (ref 0–0.5)
LYMPHOCYTES # BLD: 2.9 K/UL (ref 0.8–3.5)
LYMPHOCYTES NFR BLD: 18 % (ref 12–49)
MCH RBC QN AUTO: 32.1 PG (ref 26–34)
MCHC RBC AUTO-ENTMCNC: 34.2 G/DL (ref 30–36.5)
MCV RBC AUTO: 93.8 FL (ref 80–99)
MONOCYTES # BLD: 0.9 K/UL (ref 0–1)
MONOCYTES NFR BLD: 6 % (ref 5–13)
NEUTS SEG # BLD: 11.4 K/UL (ref 1.8–8)
NEUTS SEG NFR BLD: 74 % (ref 32–75)
NRBC # BLD: 0 K/UL (ref 0–0.01)
NRBC BLD-RTO: 0 PER 100 WBC
PLATELET # BLD AUTO: 228 K/UL (ref 150–400)
PMV BLD AUTO: 10.7 FL (ref 8.9–12.9)
RBC # BLD AUTO: 3.4 M/UL (ref 3.8–5.2)
WBC # BLD AUTO: 15.5 K/UL (ref 3.6–11)

## 2022-06-07 PROCEDURE — 85025 COMPLETE CBC W/AUTO DIFF WBC: CPT

## 2022-06-07 PROCEDURE — 65410000002 HC RM PRIVATE OB

## 2022-06-07 PROCEDURE — 36415 COLL VENOUS BLD VENIPUNCTURE: CPT

## 2022-06-07 PROCEDURE — 74011636637 HC RX REV CODE- 636/637: Performed by: OBSTETRICS & GYNECOLOGY

## 2022-06-07 PROCEDURE — 74011250637 HC RX REV CODE- 250/637: Performed by: OBSTETRICS & GYNECOLOGY

## 2022-06-07 RX ORDER — BUPRENORPHINE HYDROCHLORIDE 8 MG/1
8 TABLET SUBLINGUAL 3 TIMES DAILY
Status: DISCONTINUED | OUTPATIENT
Start: 2022-06-07 | End: 2022-06-09 | Stop reason: HOSPADM

## 2022-06-07 RX ADMIN — ACETAMINOPHEN 650 MG: 325 TABLET ORAL at 02:54

## 2022-06-07 RX ADMIN — HYDROMORPHONE HYDROCHLORIDE 2 MG: 2 TABLET ORAL at 09:14

## 2022-06-07 RX ADMIN — BUPRENORPHINE HCL 8 MG: 8 TABLET SUBLINGUAL at 22:08

## 2022-06-07 RX ADMIN — ACETAMINOPHEN 650 MG: 325 TABLET ORAL at 07:23

## 2022-06-07 RX ADMIN — HYDROMORPHONE HYDROCHLORIDE 2 MG: 2 TABLET ORAL at 22:08

## 2022-06-07 RX ADMIN — IBUPROFEN 800 MG: 800 TABLET, FILM COATED ORAL at 08:29

## 2022-06-07 RX ADMIN — HYDROMORPHONE HYDROCHLORIDE 2 MG: 2 TABLET ORAL at 16:25

## 2022-06-07 RX ADMIN — SIMETHICONE 80 MG: 80 TABLET, CHEWABLE ORAL at 22:08

## 2022-06-07 RX ADMIN — IBUPROFEN 800 MG: 800 TABLET, FILM COATED ORAL at 16:24

## 2022-06-07 RX ADMIN — HYDROMORPHONE HYDROCHLORIDE 2 MG: 2 TABLET ORAL at 02:54

## 2022-06-07 RX ADMIN — ZOLPIDEM TARTRATE 5 MG: 5 TABLET ORAL at 23:47

## 2022-06-07 RX ADMIN — IBUPROFEN 800 MG: 800 TABLET, FILM COATED ORAL at 00:50

## 2022-06-07 RX ADMIN — IBUPROFEN 800 MG: 800 TABLET, FILM COATED ORAL at 23:47

## 2022-06-07 RX ADMIN — MAGNESIUM HYDROXIDE 30 ML: 2400 SUSPENSION ORAL at 22:08

## 2022-06-07 RX ADMIN — DEXTROAMPHETAMINE SACCHARATE, AMPHETAMINE ASPARTATE, DEXTROAMPHETAMINE SULFATE, AMPHETAMINE SULFATE TABLETS, 10 MG,CLL 30 MG: 2.5; 2.5; 2.5; 2.5 TABLET ORAL at 18:13

## 2022-06-07 RX ADMIN — DEXTROAMPHETAMINE SACCHARATE, AMPHETAMINE ASPARTATE, DEXTROAMPHETAMINE SULFATE, AMPHETAMINE SULFATE TABLETS, 10 MG,CLL 30 MG: 2.5; 2.5; 2.5; 2.5 TABLET ORAL at 08:30

## 2022-06-07 RX ADMIN — BUPRENORPHINE HCL 8 MG: 8 TABLET SUBLINGUAL at 16:25

## 2022-06-07 RX ADMIN — BUPRENORPHINE HCL 8 MG: 2 TABLET SUBLINGUAL at 08:30

## 2022-06-07 NOTE — ROUTINE PROCESS
Bedside and Verbal shift change report given to KAYA Quinn RN (oncoming nurse) by KATHY Lucas RN and SHAY Suazo RN (offgoing nurse). Report included the following information SBAR, Kardex, Intake/Output and MAR.

## 2022-06-07 NOTE — PROGRESS NOTES
1900: Shift report received from [facility]    3523: Pt requested to go outside to smoke cigarettes and traveled off unit by foot. Pt has voided 3x since antunez catheter removed, both IV's removed, bleeding has remained small, and vitals signs stable. Pt advised to return upstairs if she is not feeling well or is experiencing an increase in bleeding. 0130: This RN went to bedside to round and the patient was not in room. This RN called the patient's cell phone to ask patient to return to room. 0140: Pt back in room    0315: Pt left unit to go outside and smoke cigarettes.

## 2022-06-07 NOTE — PROGRESS NOTES
Post-Operative Day 1 Progress Note    Patient now post-op day 1 following  delivery. No significant complaints. Pain controlled on medication. Catheter out, normal lochia. Vitals:  Patient Vitals for the past 8 hrs:   BP Temp Pulse Resp SpO2   22 0728 113/60 98.4 °F (36.9 °C) 76 18 100 %   22 0300 119/77 98 °F (36.7 °C) 86 16 --     Temp (24hrs), Av.7 °F (36.5 °C), Min:95.9 °F (35.5 °C), Max:98.6 °F (37 °C)      Vital signs stable, afebrile. Exam:  Patient without distress. Abdomen soft, flat, non-tender with fundus firm. Incision  clean, dry, and intact without erythema. Lower extremities are negative for swelling, cords or tenderness. .    Labs:   Recent Results (from the past 24 hour(s))   CBC WITH AUTOMATED DIFF    Collection Time: 22  8:37 AM   Result Value Ref Range    WBC 11.5 (H) 3.6 - 11.0 K/uL    RBC 3.83 3.80 - 5.20 M/uL    HGB 12.4 11.5 - 16.0 g/dL    HCT 35.7 35.0 - 47.0 %    MCV 93.2 80.0 - 99.0 FL    MCH 32.4 26.0 - 34.0 PG    MCHC 34.7 30.0 - 36.5 g/dL    RDW 14.3 11.5 - 14.5 %    PLATELET 711 708 - 938 K/uL    MPV 10.4 8.9 - 12.9 FL    NRBC 0.0 0  WBC    ABSOLUTE NRBC 0.00 0.00 - 0.01 K/uL    NEUTROPHILS 61 32 - 75 %    LYMPHOCYTES 28 12 - 49 %    MONOCYTES 8 5 - 13 %    EOSINOPHILS 2 0 - 7 %    BASOPHILS 0 0 - 1 %    IMMATURE GRANULOCYTES 1 (H) 0.0 - 0.5 %    ABS. NEUTROPHILS 7.0 1.8 - 8.0 K/UL    ABS. LYMPHOCYTES 3.2 0.8 - 3.5 K/UL    ABS. MONOCYTES 0.9 0.0 - 1.0 K/UL    ABS. EOSINOPHILS 0.2 0.0 - 0.4 K/UL    ABS. BASOPHILS 0.1 0.0 - 0.1 K/UL    ABS. IMM.  GRANS. 0.1 (H) 0.00 - 0.04 K/UL    DF AUTOMATED     TYPE & SCREEN    Collection Time: 22  8:37 AM   Result Value Ref Range    Crossmatch Expiration 2022,2359     ABO/Rh(D) A POSITIVE     Antibody screen NEG    CBC WITH AUTOMATED DIFF    Collection Time: 22  2:07 AM   Result Value Ref Range    WBC 15.5 (H) 3.6 - 11.0 K/uL    RBC 3.40 (L) 3.80 - 5.20 M/uL    HGB 10.9 (L) 11.5 - 16.0 g/dL    HCT 31.9 (L) 35.0 - 47.0 %    MCV 93.8 80.0 - 99.0 FL    MCH 32.1 26.0 - 34.0 PG    MCHC 34.2 30.0 - 36.5 g/dL    RDW 14.0 11.5 - 14.5 %    PLATELET 706 682 - 631 K/uL    MPV 10.7 8.9 - 12.9 FL    NRBC 0.0 0  WBC    ABSOLUTE NRBC 0.00 0.00 - 0.01 K/uL    NEUTROPHILS 74 32 - 75 %    LYMPHOCYTES 18 12 - 49 %    MONOCYTES 6 5 - 13 %    EOSINOPHILS 1 0 - 7 %    BASOPHILS 0 0 - 1 %    IMMATURE GRANULOCYTES 1 (H) 0.0 - 0.5 %    ABS. NEUTROPHILS 11.4 (H) 1.8 - 8.0 K/UL    ABS. LYMPHOCYTES 2.9 0.8 - 3.5 K/UL    ABS. MONOCYTES 0.9 0.0 - 1.0 K/UL    ABS. EOSINOPHILS 0.1 0.0 - 0.4 K/UL    ABS. BASOPHILS 0.0 0.0 - 0.1 K/UL    ABS. IMM. GRANS. 0.1 (H) 0.00 - 0.04 K/UL    DF AUTOMATED         Assessment and Plan:  Patient doing well post- day 1. Continue routine post-op care and maternal education.

## 2022-06-08 PROCEDURE — 74011636637 HC RX REV CODE- 636/637: Performed by: OBSTETRICS & GYNECOLOGY

## 2022-06-08 PROCEDURE — 74011250637 HC RX REV CODE- 250/637: Performed by: OBSTETRICS & GYNECOLOGY

## 2022-06-08 PROCEDURE — 65410000002 HC RM PRIVATE OB

## 2022-06-08 RX ADMIN — IBUPROFEN 800 MG: 800 TABLET, FILM COATED ORAL at 22:37

## 2022-06-08 RX ADMIN — HYDROMORPHONE HYDROCHLORIDE 2 MG: 2 TABLET ORAL at 12:40

## 2022-06-08 RX ADMIN — DEXTROAMPHETAMINE SACCHARATE, AMPHETAMINE ASPARTATE, DEXTROAMPHETAMINE SULFATE, AMPHETAMINE SULFATE TABLETS, 10 MG,CLL 30 MG: 2.5; 2.5; 2.5; 2.5 TABLET ORAL at 08:31

## 2022-06-08 RX ADMIN — SIMETHICONE 80 MG: 80 TABLET, CHEWABLE ORAL at 12:40

## 2022-06-08 RX ADMIN — MAGNESIUM HYDROXIDE 30 ML: 2400 SUSPENSION ORAL at 08:40

## 2022-06-08 RX ADMIN — ACETAMINOPHEN 650 MG: 325 TABLET ORAL at 08:31

## 2022-06-08 RX ADMIN — BUPRENORPHINE HCL 8 MG: 8 TABLET SUBLINGUAL at 08:31

## 2022-06-08 RX ADMIN — BUPRENORPHINE HCL 8 MG: 8 TABLET SUBLINGUAL at 22:37

## 2022-06-08 RX ADMIN — IBUPROFEN 800 MG: 800 TABLET, FILM COATED ORAL at 14:31

## 2022-06-08 RX ADMIN — SIMETHICONE 80 MG: 80 TABLET, CHEWABLE ORAL at 08:40

## 2022-06-08 RX ADMIN — IBUPROFEN 800 MG: 800 TABLET, FILM COATED ORAL at 06:49

## 2022-06-08 RX ADMIN — ACETAMINOPHEN 650 MG: 325 TABLET ORAL at 12:40

## 2022-06-08 RX ADMIN — DEXTROAMPHETAMINE SACCHARATE, AMPHETAMINE ASPARTATE, DEXTROAMPHETAMINE SULFATE, AMPHETAMINE SULFATE TABLETS, 10 MG,CLL 30 MG: 2.5; 2.5; 2.5; 2.5 TABLET ORAL at 18:32

## 2022-06-08 RX ADMIN — HYDROMORPHONE HYDROCHLORIDE 2 MG: 2 TABLET ORAL at 18:32

## 2022-06-08 RX ADMIN — HYDROMORPHONE HYDROCHLORIDE 2 MG: 2 TABLET ORAL at 06:48

## 2022-06-08 RX ADMIN — BUPRENORPHINE HCL 8 MG: 8 TABLET SUBLINGUAL at 15:37

## 2022-06-08 RX ADMIN — SIMETHICONE 80 MG: 80 TABLET, CHEWABLE ORAL at 18:36

## 2022-06-08 NOTE — PROGRESS NOTES
Post-Operative Day 2 Progress Note    Patient now post-op day 2 following  delivery. No significant complaints. Pain controlled on medication. Voiding without difficulty, normal lochia. Vitals:  Patient Vitals for the past 8 hrs:   BP Temp Pulse Resp   22 0847 127/80 98.6 °F (37 °C) 96 16     Temp (24hrs), Av.5 °F (36.9 °C), Min:97.6 °F (36.4 °C), Max:99.4 °F (37.4 °C)      Vital signs stable, afebrile. Exam:  Patient without distress. Abdomen soft, flat, non-tender with fundus firm. Incision  clean, dry, and intact without erythema. Lower extremities are negative for swelling, cords or tenderness. .    Labs: No results found for this or any previous visit (from the past 24 hour(s)). Assessment and Plan:  Patient doing well post- day 2. Continue routine post-op care and maternal education.

## 2022-06-08 NOTE — PROGRESS NOTES
0730 - Patient walked off unit and dropped infant off in the nursery. RN to complete assessment when she comes back to the unit. 6359 - Patient back on unit and returned to room. 0830 - Medication given, ABD pad removed. Patient linens changed, she is to shower while infant remains in nursery. 1130- Infant returned to room. 65 - Mother asked RN to take baby to the nursery so she could go outside and smoke. 200 - Mom back in room, infant returned with mother.

## 2022-06-09 VITALS
HEART RATE: 95 BPM | SYSTOLIC BLOOD PRESSURE: 135 MMHG | TEMPERATURE: 98 F | OXYGEN SATURATION: 97 % | RESPIRATION RATE: 16 BRPM | DIASTOLIC BLOOD PRESSURE: 80 MMHG

## 2022-06-09 PROCEDURE — 74011250637 HC RX REV CODE- 250/637: Performed by: OBSTETRICS & GYNECOLOGY

## 2022-06-09 PROCEDURE — 74011636637 HC RX REV CODE- 636/637: Performed by: OBSTETRICS & GYNECOLOGY

## 2022-06-09 RX ORDER — HYDROMORPHONE HYDROCHLORIDE 2 MG/1
2 TABLET ORAL
Qty: 22 TABLET | Refills: 0 | Status: SHIPPED | OUTPATIENT
Start: 2022-06-09 | End: 2022-06-16

## 2022-06-09 RX ADMIN — DEXTROAMPHETAMINE SACCHARATE, AMPHETAMINE ASPARTATE, DEXTROAMPHETAMINE SULFATE, AMPHETAMINE SULFATE TABLETS, 10 MG,CLL 30 MG: 2.5; 2.5; 2.5; 2.5 TABLET ORAL at 09:47

## 2022-06-09 RX ADMIN — IBUPROFEN 800 MG: 800 TABLET, FILM COATED ORAL at 07:33

## 2022-06-09 RX ADMIN — HYDROMORPHONE HYDROCHLORIDE 2 MG: 2 TABLET ORAL at 00:40

## 2022-06-09 RX ADMIN — BUPRENORPHINE HCL 8 MG: 8 TABLET SUBLINGUAL at 09:48

## 2022-06-09 NOTE — DISCHARGE SUMMARY
Obstetrical Discharge Summary     Name: Cindi Fenton MRN: [id number]  SSN: [id number]    YOB: 1985  Age: 39 y.o. Sex: female      Admit Date: 2022    Discharge Date: 2022     Admitting Physician: Garcia Chery MD     Attending Physician:  You Wang MD     Admission Diagnoses: Previous  delivery affecting pregnancy [O34.219]; S/P repeat low transverse  [Y24.636]    Discharge Diagnoses:   Information for the patient's :  Mor Gayle Male  [place] [[id number]]   Delivery of a 5 lb 8 oz (2.495 kg) male infant via , Low Transverse on 2022 at 10:10 AM  by Mindy Porras. Apgars were 9  and 9 . Additional Diagnoses:   Hospital Problems  Date Reviewed: 2022          Codes Class Noted POA    S/P repeat low transverse  ICD-10-CM: Z98.891  ICD-9-CM: V45.89  2022 Unknown             Lab Results   Component Value Date/Time    Rubella, External immune 2022 12:00 AM    GrBStrep, External Negative 2018 12:00 AM       Immunization(s):   Immunization History   Administered Date(s) Administered    Tdap 2018, 06/15/2020, 2022        Hospital Course: Normal hospital course following the delivery. The patient was released to her home in good condition. Patient Instructions:     Reference my discharge instructions. I spent 10 minutes discharging the patient in face to face contact. Follow-up Appointments   Procedures    FOLLOW UP VISIT Appointment in: Two Weeks     Standing Status:   Standing     Number of Occurrences:   1     Order Specific Question:   Appointment in     Answer:    Two Weeks        Signed By:  Garcia Chery MD     2022

## 2022-06-09 NOTE — PROGRESS NOTES
Patient called out reporting bleeding from abdominal incision. Upon entering room, bleeding noted and dehiscence of  incision on left side approximately 3 inches in length. Dr. Blayne Vazquez called to the room. Dr. Blayne Vazquez replaced staples and steri strips. Patient tolerated well. Patient educated on procedure, infection prevention, discharge instructions and incision care, follow up appointment, s/s infection; verbalized understanding. Patient discharged home with . Baby remains in nursery for HATTIE 5 day admission.

## 2022-06-09 NOTE — PROGRESS NOTES
2130 report received from [place]. 2200 Pt out of room for change of shift assessment. [place] 33 requesting Security at bedside, patient becoming increasingly verbally aggressive. [facility] called. 2255 Patient de-escalated by nursery nurse and NICU nurse practioner. 24 Casandra Elmore present on unit. Educated on patient situation. 0040 pt requesting nicotine patch    0100 pt refusing nicotine patch at this time    0730 Bedside, Verbal and Written shift change report given to Hoa Hammer (oncoming nurse) by vani Madden rn (offgoing nurse). Report included the following information SBAR, Kardex, OR Summary, Intake/Output, MAR, Recent Results, Med Rec Status and Quality Measures.

## 2022-06-09 NOTE — PROGRESS NOTES
Post-Operative Day 3 Progress/Discharge Note    Patient now post-op day 3 following  delivery. No significant complaints. Pain controlled on medication. Voiding without difficulty, normal lochia. Vitals:  No data found. Temp (24hrs), Av.6 °F (37 °C), Min:98.6 °F (37 °C), Max:98.6 °F (37 °C)      Vital signs stable, afebrile. Exam:  Patient without distress. Abdomen soft, flat, non-tender with fundus firm. Incision  clean, dry, and intact without erythema. Lower extremities are negative for swelling, cords or tenderness. Labs: No results found for this or any previous visit (from the past 24 hour(s)). Assessment and Plan:  Patient has to date had an uncomplicated post- course. Continue routine post-op care and maternal education. Plan discharge for today--see discharge summary.

## 2022-06-09 NOTE — ROUTINE PROCESS
2000: Patient is off the floor, unable to assess at this time. 2115: Bedside and Verbal shift change report given to SHAY Gibbs RN (oncoming nurse) by KATHY Cho RN (offgoing nurse). Report included the following information SBAR, Kardex, Intake/Output, MAR and Recent Results.

## 2022-06-16 ENCOUNTER — OFFICE VISIT (OUTPATIENT)
Dept: OBGYN CLINIC | Age: 37
End: 2022-06-16
Payer: MEDICAID

## 2022-06-16 VITALS — DIASTOLIC BLOOD PRESSURE: 61 MMHG | WEIGHT: 121 LBS | BODY MASS INDEX: 21.43 KG/M2 | SYSTOLIC BLOOD PRESSURE: 88 MMHG

## 2022-06-16 DIAGNOSIS — Z09 POSTOP CHECK: Primary | ICD-10-CM

## 2022-06-16 PROCEDURE — 99024 POSTOP FOLLOW-UP VISIT: CPT | Performed by: OBSTETRICS & GYNECOLOGY

## 2022-06-16 NOTE — PROGRESS NOTES
Postop  Evaluation  Reba Coronado is a 39 y.o. female returns for a routine post-operative follow-up visit after undergoing a  section which was done 1 week ago. She had the following pathology results: none sent. Since the patient's surgery, she has had typical postoperative discomfort but no significant symptoms or problems since the surgery. The patient's incision is healing well with no significant drainage. She states since the procedure, she has returned to most daily activities, ambulating, and not lifting or exercising. PHYSICAL EXAMINATION    Gastrointestinal  · Abdominal Examination: abdomen non-tender to palpation, incision intact and healing well, normal bowel sounds, no masses present  · Liver and spleen: no hepatomegaly present, spleen not palpable  · Hernias: no hernias identified    Skin  · General Inspection: no rash, no lesions identified    Neurologic/Psychiatric  · Mental Status:  · Orientation: grossly oriented to person, place and time  · Mood and Affect: mood normal, affect appropriate    Assessment:  Normal postop  checkup  Staples removed from the left half of incision, benzoin and steri strips applied. Plan:  RTO as scheduled for PP checkup at 6 weeks postpartum.

## 2023-01-09 ENCOUNTER — OFFICE VISIT (OUTPATIENT)
Dept: OBGYN CLINIC | Age: 38
End: 2023-01-09
Payer: MEDICAID

## 2023-01-09 VITALS — WEIGHT: 124 LBS | SYSTOLIC BLOOD PRESSURE: 124 MMHG | BODY MASS INDEX: 21.97 KG/M2 | DIASTOLIC BLOOD PRESSURE: 77 MMHG

## 2023-01-09 DIAGNOSIS — Z11.51 ENCOUNTER FOR SCREENING FOR HUMAN PAPILLOMAVIRUS (HPV): ICD-10-CM

## 2023-01-09 DIAGNOSIS — Z01.419 ENCOUNTER FOR GYNECOLOGICAL EXAMINATION: Primary | ICD-10-CM

## 2023-01-09 DIAGNOSIS — Z30.9 ENCOUNTER FOR CONTRACEPTIVE MANAGEMENT, UNSPECIFIED TYPE: ICD-10-CM

## 2023-01-09 DIAGNOSIS — Z12.4 CERVICAL CANCER SCREENING: ICD-10-CM

## 2023-01-09 DIAGNOSIS — N94.10 DYSPAREUNIA, FEMALE: ICD-10-CM

## 2023-01-09 PROCEDURE — 99395 PREV VISIT EST AGE 18-39: CPT | Performed by: OBSTETRICS & GYNECOLOGY

## 2023-01-09 NOTE — PROGRESS NOTES
Annual exam  Kosta Lujan is a ,  40 y.o. female   No LMP recorded. She presents for her annual checkup. She is having no significant problems. Has pain with IC for last month. Menstrual status:    Her periods are heavy in flow and usually regular with a 26-32 day interval with 2-4 day duration. She does not have dysmenorrhea. Sexual history:    She  reports being sexually active and has had partner(s) who are male. She reports using the following method of birth control/protection: None. Wants to do something. Per Nursing Note:  Last Pap: abnormal obtained 1 year(s) ago. She does not have a history of DORY 2, 3 or cervical cancer. With regard to the Gardisil vaccine, she has not received it yet. Past Medical History:   Diagnosis Date    Abnormal Pap smear 11    LGSIL + HPV    Abnormal Papanicolaou smear of cervix     ADHD (attention deficit hyperactivity disorder)     Asthma     inhaler prn    Atypical squamous cells of undetermined significance (ASCUS) on Papanicolaou smear of cervix 22, 2020    Cervical high risk HPV (human papillomavirus) test positive 22, 2020,2018    HPV test positive 11    Low grade squamous intraepithelial lesion (LGSIL) on Papanicolaou smear of cervix 2018    Postpartum depression     Psychiatric problem     drug addiction- opiates, initial dependency years ago. .methadone [place]    Trichomonas infection 2014     Past Surgical History:   Procedure Laterality Date    HX COLPOSCOPY  2020    no bx due to pregnancy    HX DILATION AND CURETTAGE      HX TONSILLECTOMY      HX WISDOM TEETH EXTRACTION      [place]  DELIVERY ONLY         Current Outpatient Medications   Medication Sig Dispense Refill    albuterol (PROVENTIL HFA, VENTOLIN HFA, PROAIR HFA) 90 mcg/actuation inhaler Take 1-2 Puffs by inhalation every four (4) hours as needed for Wheezing.  1 Inhaler 1    buprenorphine HCl (SUBUTEX) 8 mg sublingual tablet 8 mg by SubLINGual route three (3) times daily. Indications: prevention of relapse to opioid dependence      PNV No12-Iron-FA-DSS-OM-3 29 mg iron-1 mg -50 mg CPKD Take  by mouth. dextroamphetamine-amphetamine (ADDERALL) 20 mg tablet Take 30 mg by mouth two (2) times a day. Indications: attention deficit disorder with hyperactivity       Allergies: Cephalosporins, Codeine, and Pcn [penicillins]     Tobacco History:  reports that she has been smoking cigarettes. She started smoking about 18 years ago. She has a 15.00 pack-year smoking history. She has never used smokeless tobacco.  Alcohol Abuse:  reports that she does not currently use alcohol. Drug Abuse:  reports current drug use. Drugs: Other, Cocaine, and Heroin.     Family Medical/Cancer History:   Family History   Problem Relation Age of Onset    Drug Abuse Mother     Migraines Mother     Drug Abuse Father     OSTEOARTHRITIS Maternal Grandmother     Asthma Brother         Review of Systems - History obtained from the patient  Constitutional: negative for weight loss, fever, night sweats  HEENT: negative for hearing loss, earache, congestion, snoring, sorethroat  CV: negative for chest pain, palpitations, edema  Resp: negative for cough, shortness of breath, wheezing  GI: negative for change in bowel habits, abdominal pain, black or bloody stools  : negative for frequency, dysuria, hematuria, vaginal discharge  MSK: negative for back pain, joint pain, muscle pain  Breast: negative for breast lumps, nipple discharge, galactorrhea  Skin :negative for itching, rash, hives  Neuro: negative for dizziness, headache, confusion, weakness  Psych: negative for anxiety, depression, change in mood  Heme/lymph: negative for bleeding, bruising, pallor    Physical Exam    Visit Vitals  /77   Wt 124 lb (56.2 kg)   BMI 21.97 kg/m²       Constitutional  Appearance: well-nourished, well developed, alert, in no acute distress    HENT  Head and Face: appears normal    Neck  Inspection/Palpation: normal appearance, no masses or tenderness  Lymph Nodes: no lymphadenopathy present  Thyroid: gland size normal, nontender, no nodules or masses present on palpation    Chest  Respiratory Effort: breathing unlabored  Auscultation: normal breath sounds    Cardiovascular  Heart:   Auscultation: regular rate and rhythm without murmur    Breasts  Inspection of Breasts: breasts symmetrical, no skin changes, no discharge present, nipple appearance normal, no skin retraction present  Palpation of Breasts and Axillae: no masses present on palpation, no breast tenderness  Axillary Lymph Nodes: no lymphadenopathy present    Gastrointestinal  Abdominal Examination: abdomen non-tender to palpation, normal bowel sounds, no masses present  Liver and spleen: no hepatomegaly present, spleen not palpable  Hernias: no hernias identified    Genitourinary  External Genitalia: normal appearance for age, no discharge present, no tenderness present, no inflammatory lesions present, no masses present, no atrophy present  Vagina: normal vaginal vault without central or paravaginal defects, no discharge present, no inflammatory lesions present, no masses present  Bladder: non-tender to palpation  Urethra: appears normal  Cervix: normal   Uterus: normal size, shape and consistency  Adnexa: no adnexal tenderness present, no adnexal masses present  Perineum: perineum within normal limits, no evidence of trauma, no rashes or skin lesions present  Anus: anus within normal limits, no hemorrhoids present  Inguinal Lymph Nodes: no lymphadenopathy present    Skin  General Inspection: no rash, no lesions identified    Neurologic/Psychiatric  Mental Status:  Orientation: grossly oriented to person, place and time  Mood and Affect: mood normal, affect appropriate    Assessment:  Routine gynecologic examination  Her current medical status is satisfactory with no evidence of significant gynecologic issues. Dyspareunia but normal exam.  Needs contraception. Periods heavy but short. Discussed alternatives. Plan:  Wants Mirena--RBA discussed.   Counseled re: diet, exercise, healthy lifestyle  Return for yearly wellness visits  Gardisil counseling provided  Pt counseled regarding co-testing for high risk HPV with pap  Rec screening mammo at either 35 or 40

## 2023-01-09 NOTE — PROGRESS NOTES
Ramo Chow is a 40 y.o. female returns for an annual exam     No chief complaint on file. No LMP recorded. Her periods are heavy in flow and usually regular with a 26-32 day interval with 3-7 day duration. She does not have dysmenorrhea. Problems: no significant problems  Birth Control: none. Last Pap: abnormal obtained 1 year(s) ago. She does not have a history of DORY 2, 3 or cervical cancer. With regard to the Gardisil vaccine, she has not received it yet. 1. Have you been to the ER, urgent care clinic, or hospitalized since your last visit? No    2. Have you seen or consulted any other health care providers outside of the [address] [place] [address] since your last visit? No    Examination chaperoned by Funmilayo Ch LPN.

## 2023-01-12 LAB
CYTOLOGIST CVX/VAG CYTO: ABNORMAL
CYTOLOGY CVX/VAG DOC CYTO: ABNORMAL
CYTOLOGY CVX/VAG DOC THIN PREP: ABNORMAL
CYTOLOGY HISTORY:: ABNORMAL
DX ICD CODE: ABNORMAL
DX ICD CODE: ABNORMAL
HPV GENOTYPE REFLEX: ABNORMAL
HPV I/H RISK 4 DNA CVX QL PROBE+SIG AMP: POSITIVE
Lab: ABNORMAL
OTHER STN SPEC: ABNORMAL
PATHOLOGIST CVX/VAG CYTO: ABNORMAL
STAT OF ADQ CVX/VAG CYTO-IMP: ABNORMAL

## 2023-01-19 ENCOUNTER — TELEPHONE (OUTPATIENT)
Dept: OBGYN CLINIC | Age: 38
End: 2023-01-19

## 2023-01-19 NOTE — TELEPHONE ENCOUNTER
40year old patient seen in the office on 1/9/2023 and is calling back about her recent lab work     Patient advised of MD reviewed lab work and was placed on the schedule to be seen for colposcopy tomorrow at 8:30am    Patient was provided instructions for prior and regarding the procedure    Patient verbalized understanding.      Reuben Sarabia MD   1/12/2023  9:00 AM EST       Pt needs colpo and bx  Tickle       Patient Communication

## 2023-01-20 ENCOUNTER — OFFICE VISIT (OUTPATIENT)
Dept: OBGYN CLINIC | Age: 38
End: 2023-01-20
Payer: MEDICAID

## 2023-01-20 VITALS — WEIGHT: 129 LBS | SYSTOLIC BLOOD PRESSURE: 109 MMHG | BODY MASS INDEX: 22.85 KG/M2 | DIASTOLIC BLOOD PRESSURE: 68 MMHG

## 2023-01-20 DIAGNOSIS — Z01.812 PRE-PROCEDURE LAB EXAM: ICD-10-CM

## 2023-01-20 DIAGNOSIS — B97.7 HIGH RISK HPV INFECTION: ICD-10-CM

## 2023-01-20 DIAGNOSIS — R87.613 HSIL ON PAP SMEAR OF CERVIX: Primary | ICD-10-CM

## 2023-01-20 LAB
HCG URINE, QL. (POC): NEGATIVE
VALID INTERNAL CONTROL?: YES

## 2023-01-20 NOTE — PROGRESS NOTES
Matha Gottron is a 40 y.o. female presents for a problem visit. Chief Complaint   Patient presents with    Colposcopy       Problems: Abnormal Pap     Patient's last menstrual period was 2023 (approximate). Birth Control: none. Last Pap: abnormal obtained 2023 year(s) ago. 1. Have you been to the ER, urgent care clinic, or hospitalized since your last visit? No    2. Have you seen or consulted any other health care providers outside of the [address] [place] [address] since your last visit? No      Chart reviewed for the following:   Lindsey YUSUF LPN, have reviewed the History, Physical and updated the Allergic reactions for [address] performed immediately prior to start of procedure:   Zana Arriola LPN, have performed the following reviews on Matha Gottron prior to the start of the procedure:            * Patient was identified by name and date of birth   * Agreement on procedure being performed was verified  * Risks and Benefits explained to the patient  * Procedure site verified and marked as necessary  * Patient was positioned for comfort  * Consent was signed and verified     Time: 915    Date of procedure: 2023    Procedure performed by: Sam Metz MD    Provider assisted by: Lindsey Lilly LPN    Patient assisted by: self    How tolerated by patient: tolerated the procedure well with no complications    Post Procedural Pain Scale: 0 - No Hurt    Comments: none    Examination chaperoned by Lindsey Lilly LPN. Procedure Note: Colposcopy without bx    Matha Gottron is a ,  40 y.o. female [address] 9 Patient's last menstrual period was 2023 (approximate). She presents for colposcopy for evaluation of a cervical abnormality with a pap smear abnormality consisting of HSIL and HPV. After being presented with the risks, benefits and alternatives has she signed a consent for the procedure.  She states that she understands the need for the procedure and has no further questions. She was informed that she may experience discomfort. Procedure:  She was positioned in the dorsal lithotomy position and a speculum was inserted into the vagina. Dilute acetic acid was applied to the cervix. The colposcope was used to visualize the cervix. Procedure: The transformation zone was completely visualized. Findings: This colposcopy was not satisfactory. The procedure was notable for a normal T zone but white lesion visible in the endocervix which could not be reached with the biopsy instrument and extended beyond the field of view. Biopsies were not taken from the cervix. Nothing was applied to the cervix for hemostasis. Endocervical currettage: An endocervical curettage was not performed. Post Procedure Status: The patient tolerated the procedure well with minimal discomfort. She is advised she needs a LEEP. It was scheduled. She was observed for 10 minutes and released in good condition.

## 2023-02-15 ENCOUNTER — ANESTHESIA EVENT (OUTPATIENT)
Dept: SURGERY | Age: 38
End: 2023-02-15
Payer: MEDICAID

## 2023-02-16 ENCOUNTER — HOSPITAL ENCOUNTER (OUTPATIENT)
Age: 38
Setting detail: OUTPATIENT SURGERY
Discharge: HOME | End: 2023-02-16
Attending: OBSTETRICS & GYNECOLOGY | Admitting: OBSTETRICS & GYNECOLOGY
Payer: MEDICAID

## 2023-02-16 ENCOUNTER — ANESTHESIA (OUTPATIENT)
Dept: SURGERY | Age: 38
End: 2023-02-16
Payer: MEDICAID

## 2023-02-16 VITALS
SYSTOLIC BLOOD PRESSURE: 107 MMHG | HEART RATE: 81 BPM | WEIGHT: 124.56 LBS | HEIGHT: 63 IN | DIASTOLIC BLOOD PRESSURE: 54 MMHG | BODY MASS INDEX: 22.07 KG/M2 | RESPIRATION RATE: 12 BRPM | OXYGEN SATURATION: 97 % | TEMPERATURE: 97.7 F

## 2023-02-16 DIAGNOSIS — R87.618 ABNORMAL PAPANICOLAOU SMEAR OF CERVIX WITH POSITIVE HUMAN PAPILLOMA VIRUS (HPV) TEST: ICD-10-CM

## 2023-02-16 DIAGNOSIS — R87.613 HGSIL (HIGH GRADE SQUAMOUS INTRAEPITHELIAL LESION) ON PAP SMEAR OF CERVIX: ICD-10-CM

## 2023-02-16 LAB — HCG UR QL: NEGATIVE

## 2023-02-16 PROCEDURE — 76210000063 HC OR PH I REC FIRST 0.5 HR: Performed by: OBSTETRICS & GYNECOLOGY

## 2023-02-16 PROCEDURE — 77030040922 HC BLNKT HYPOTHRM STRY -A

## 2023-02-16 PROCEDURE — 74011000272 HC RX REV CODE- 272: Performed by: OBSTETRICS & GYNECOLOGY

## 2023-02-16 PROCEDURE — 77030007304 HC ELECTRD LP RND MEGA -A: Performed by: OBSTETRICS & GYNECOLOGY

## 2023-02-16 PROCEDURE — 74011000250 HC RX REV CODE- 250: Performed by: NURSE ANESTHETIST, CERTIFIED REGISTERED

## 2023-02-16 PROCEDURE — 77030040361 HC SLV COMPR DVT MDII -B

## 2023-02-16 PROCEDURE — 74011250636 HC RX REV CODE- 250/636: Performed by: NURSE ANESTHETIST, CERTIFIED REGISTERED

## 2023-02-16 PROCEDURE — 74011250637 HC RX REV CODE- 250/637: Performed by: ANESTHESIOLOGY

## 2023-02-16 PROCEDURE — 76210000020 HC REC RM PH II FIRST 0.5 HR: Performed by: OBSTETRICS & GYNECOLOGY

## 2023-02-16 PROCEDURE — 77030018722 HC ELCTRD BALL LEEP UTMD -B: Performed by: OBSTETRICS & GYNECOLOGY

## 2023-02-16 PROCEDURE — 81025 URINE PREGNANCY TEST: CPT

## 2023-02-16 PROCEDURE — 77030003666 HC NDL SPINAL BD -A: Performed by: OBSTETRICS & GYNECOLOGY

## 2023-02-16 PROCEDURE — 77030020143 HC AIRWY LARYN INTUB CGAS -A: Performed by: ANESTHESIOLOGY

## 2023-02-16 PROCEDURE — 2709999900 HC NON-CHARGEABLE SUPPLY: Performed by: OBSTETRICS & GYNECOLOGY

## 2023-02-16 PROCEDURE — 74011250636 HC RX REV CODE- 250/636: Performed by: ANESTHESIOLOGY

## 2023-02-16 PROCEDURE — 76060000032 HC ANESTHESIA 0.5 TO 1 HR: Performed by: OBSTETRICS & GYNECOLOGY

## 2023-02-16 PROCEDURE — 74011000250 HC RX REV CODE- 250: Performed by: OBSTETRICS & GYNECOLOGY

## 2023-02-16 PROCEDURE — 76010000138 HC OR TIME 0.5 TO 1 HR: Performed by: OBSTETRICS & GYNECOLOGY

## 2023-02-16 PROCEDURE — 88307 TISSUE EXAM BY PATHOLOGIST: CPT

## 2023-02-16 RX ORDER — PROPOFOL 10 MG/ML
INJECTION, EMULSION INTRAVENOUS AS NEEDED
Status: DISCONTINUED | OUTPATIENT
Start: 2023-02-16 | End: 2023-02-16 | Stop reason: HOSPADM

## 2023-02-16 RX ORDER — SODIUM CHLORIDE, SODIUM LACTATE, POTASSIUM CHLORIDE, CALCIUM CHLORIDE 600; 310; 30; 20 MG/100ML; MG/100ML; MG/100ML; MG/100ML
125 INJECTION, SOLUTION INTRAVENOUS CONTINUOUS
Status: DISCONTINUED | OUTPATIENT
Start: 2023-02-16 | End: 2023-02-16 | Stop reason: HOSPADM

## 2023-02-16 RX ORDER — HYDROMORPHONE HYDROCHLORIDE 1 MG/ML
0.5 INJECTION, SOLUTION INTRAMUSCULAR; INTRAVENOUS; SUBCUTANEOUS
Status: DISCONTINUED | OUTPATIENT
Start: 2023-02-16 | End: 2023-02-16 | Stop reason: HOSPADM

## 2023-02-16 RX ORDER — ACETIC ACID 5 %
LIQUID (ML) MISCELLANEOUS AS NEEDED
Status: DISCONTINUED | OUTPATIENT
Start: 2023-02-16 | End: 2023-02-16 | Stop reason: HOSPADM

## 2023-02-16 RX ORDER — ALBUTEROL SULFATE 0.83 MG/ML
2.5 SOLUTION RESPIRATORY (INHALATION) AS NEEDED
Status: DISCONTINUED | OUTPATIENT
Start: 2023-02-16 | End: 2023-02-16 | Stop reason: HOSPADM

## 2023-02-16 RX ORDER — ACETAMINOPHEN 325 MG/1
650 TABLET ORAL ONCE
Status: COMPLETED | OUTPATIENT
Start: 2023-02-16 | End: 2023-02-16

## 2023-02-16 RX ORDER — FAMOTIDINE 10 MG/ML
INJECTION INTRAVENOUS AS NEEDED
Status: DISCONTINUED | OUTPATIENT
Start: 2023-02-16 | End: 2023-02-16 | Stop reason: HOSPADM

## 2023-02-16 RX ORDER — ONDANSETRON 2 MG/ML
4 INJECTION INTRAMUSCULAR; INTRAVENOUS AS NEEDED
Status: DISCONTINUED | OUTPATIENT
Start: 2023-02-16 | End: 2023-02-16 | Stop reason: HOSPADM

## 2023-02-16 RX ORDER — DIPHENHYDRAMINE HYDROCHLORIDE 50 MG/ML
12.5 INJECTION, SOLUTION INTRAMUSCULAR; INTRAVENOUS AS NEEDED
Status: DISCONTINUED | OUTPATIENT
Start: 2023-02-16 | End: 2023-02-16 | Stop reason: HOSPADM

## 2023-02-16 RX ORDER — DEXMEDETOMIDINE HYDROCHLORIDE 4 UG/ML
INJECTION, SOLUTION INTRAVENOUS AS NEEDED
Status: DISCONTINUED | OUTPATIENT
Start: 2023-02-16 | End: 2023-02-16 | Stop reason: HOSPADM

## 2023-02-16 RX ORDER — MIDAZOLAM HYDROCHLORIDE 1 MG/ML
INJECTION, SOLUTION INTRAMUSCULAR; INTRAVENOUS AS NEEDED
Status: DISCONTINUED | OUTPATIENT
Start: 2023-02-16 | End: 2023-02-16 | Stop reason: HOSPADM

## 2023-02-16 RX ORDER — LIDOCAINE HYDROCHLORIDE 10 MG/ML
0.1 INJECTION, SOLUTION EPIDURAL; INFILTRATION; INTRACAUDAL; PERINEURAL AS NEEDED
Status: DISCONTINUED | OUTPATIENT
Start: 2023-02-16 | End: 2023-02-16 | Stop reason: HOSPADM

## 2023-02-16 RX ORDER — DEXAMETHASONE SODIUM PHOSPHATE 4 MG/ML
INJECTION, SOLUTION INTRA-ARTICULAR; INTRALESIONAL; INTRAMUSCULAR; INTRAVENOUS; SOFT TISSUE AS NEEDED
Status: DISCONTINUED | OUTPATIENT
Start: 2023-02-16 | End: 2023-02-16 | Stop reason: HOSPADM

## 2023-02-16 RX ORDER — SODIUM CHLORIDE, SODIUM LACTATE, POTASSIUM CHLORIDE, CALCIUM CHLORIDE 600; 310; 30; 20 MG/100ML; MG/100ML; MG/100ML; MG/100ML
150 INJECTION, SOLUTION INTRAVENOUS CONTINUOUS
Status: DISCONTINUED | OUTPATIENT
Start: 2023-02-16 | End: 2023-02-16 | Stop reason: HOSPADM

## 2023-02-16 RX ORDER — ONDANSETRON 2 MG/ML
INJECTION INTRAMUSCULAR; INTRAVENOUS AS NEEDED
Status: DISCONTINUED | OUTPATIENT
Start: 2023-02-16 | End: 2023-02-16 | Stop reason: HOSPADM

## 2023-02-16 RX ORDER — LIDOCAINE HYDROCHLORIDE AND EPINEPHRINE 20; 10 MG/ML; UG/ML
INJECTION, SOLUTION INFILTRATION; PERINEURAL AS NEEDED
Status: DISCONTINUED | OUTPATIENT
Start: 2023-02-16 | End: 2023-02-16 | Stop reason: HOSPADM

## 2023-02-16 RX ORDER — EPHEDRINE SULFATE/0.9% NACL/PF 50 MG/5 ML
SYRINGE (ML) INTRAVENOUS AS NEEDED
Status: DISCONTINUED | OUTPATIENT
Start: 2023-02-16 | End: 2023-02-16 | Stop reason: HOSPADM

## 2023-02-16 RX ADMIN — FAMOTIDINE 20 MG: 10 INJECTION INTRAVENOUS at 10:44

## 2023-02-16 RX ADMIN — DEXMEDETOMIDINE HYDROCHLORIDE 4 MCG: 400 INJECTION INTRAVENOUS at 10:23

## 2023-02-16 RX ADMIN — DEXMEDETOMIDINE HYDROCHLORIDE 4 MCG: 400 INJECTION INTRAVENOUS at 10:19

## 2023-02-16 RX ADMIN — PROPOFOL 100 MG: 10 INJECTION, EMULSION INTRAVENOUS at 10:20

## 2023-02-16 RX ADMIN — Medication 20 MG: at 10:27

## 2023-02-16 RX ADMIN — ACETAMINOPHEN 650 MG: 325 TABLET ORAL at 08:48

## 2023-02-16 RX ADMIN — SODIUM CHLORIDE, POTASSIUM CHLORIDE, SODIUM LACTATE AND CALCIUM CHLORIDE 150 ML/HR: 600; 310; 30; 20 INJECTION, SOLUTION INTRAVENOUS at 08:46

## 2023-02-16 RX ADMIN — Medication 15 MG: at 10:36

## 2023-02-16 RX ADMIN — MIDAZOLAM HYDROCHLORIDE 5 MG: 1 INJECTION, SOLUTION INTRAMUSCULAR; INTRAVENOUS at 10:18

## 2023-02-16 RX ADMIN — ONDANSETRON HYDROCHLORIDE 4 MG: 2 SOLUTION INTRAMUSCULAR; INTRAVENOUS at 10:44

## 2023-02-16 RX ADMIN — DEXAMETHASONE SODIUM PHOSPHATE 4 MG: 4 INJECTION, SOLUTION INTRAMUSCULAR; INTRAVENOUS at 10:44

## 2023-02-16 NOTE — OP NOTES
LEEP Operative summary    Name: Josselin Vieyra   Medical Record Number: [id number]   YOB: 1985  Date of Surgery: 2/16/2023    Preoperative Diagnosis: HIGH GRADE SQUAMOUS INTRA EPITHELIAL LESION, MODERATE CERVICAL INTRA EPITHELIAL NEOPLASIA    Postoperative Diagnosis: HIGH GRADE SQUAMOUS INTRA EPITHELIAL LESION, MODERATE CERVICAL INTRA EPITHELIAL NEOPLASIA    Surgeon:  Michael Spear MD     Assistant:  staff    Anesthesia: General    Procedure: Procedure(s) with comments:  LOOP ELECTRODE EXCISION PROCEDURE OF CERVIX LEEP - LEEP PROCEDURE     Findings: transformation zone identified    Estimated Blood Loss:  less than 10  cc    Drains: none    Pathology /Specimens:    ID Type Source Tests Collected by Time Destination   1 : ECTOCERVICAL Preservative Vagina  Dipika Castillo MD 2/16/2023 1040 Pathology   2 : ENDOCERVICAL Preservative Vagina  Dipika Castillo MD 2/16/2023 1042 Pathology       Implants:  None    DVT Prophylaxis: NONE    Complications:  None    INDICATIONS:    Informed consent was obtained for the above procedure, and the patient stated that she had no further questions. OPERATIVE SUMMARY: The patient was prepped and draped in the dorsal lithotomy position after institution of general anesthesia. The cervix was examined. The cervix was treated with 5% Acetic acid to identify the transformation zone. It was injected with 10 cc of 1% Lidocaine with epinephrine. The appropriate size loop was selected and then the cervix was incised with the loop electrode, removing the entire transformation zone. A second pass was made for the endocervical canal.  Bleeding was minimal and complete hemostasis was obtained using the ball electrode. The procedure was terminated and the patient was taken to the recovery room in good condition.         Signed By:  Claudia Sumner MD     February 16, 2023

## 2023-02-16 NOTE — ANESTHESIA POSTPROCEDURE EVALUATION
Procedure(s):  LOOP ELECTRODE EXCISION PROCEDURE OF CERVIX LEEP. general    Anesthesia Post Evaluation      Multimodal analgesia: multimodal analgesia not used between 6 hours prior to anesthesia start to PACU discharge  Patient location during evaluation: PACU  Patient participation: complete - patient participated  Level of consciousness: awake  Pain management: adequate  Airway patency: patent  Anesthetic complications: no  Cardiovascular status: acceptable, blood pressure returned to baseline and hemodynamically stable  Respiratory status: acceptable  Hydration status: acceptable  Post anesthesia nausea and vomiting:  controlled  Final Post Anesthesia Temperature Assessment:  Normothermia (36.0-37.5 degrees C)      INITIAL Post-op Vital signs:   Vitals Value Taken Time   /54 02/16/23 1120   Temp 36.5 °C (97.7 °F) 02/16/23 1115   Pulse 76 02/16/23 1121   Resp 15 02/16/23 1121   SpO2 96 % 02/16/23 1121   Vitals shown include unvalidated device data.

## 2023-02-16 NOTE — DISCHARGE INSTRUCTIONS
LEEP: What to Expect at Home  Your Recovery  LEEP is a procedure to remove tissue from the cervix, the opening of the uterus. The doctor used instruments to remove tissue from your cervix. You may have a backache, or cramps similar to menstrual cramps, and pass small clots of blood from your vagina for the first few days. You may continue to have light vaginal bleeding for a couple weeks after the procedure. You will probably be able to go back to most of your normal activities in 1 or 2 days. This care sheet gives you a general idea about how long it will take for you to recover. But each person recovers at a different pace. Follow the steps below to get better as quickly as possible. How can you care for yourself at home? Activity  Rest when you feel tired. Getting enough sleep will help you recover. You may resume strenuous activities, such as bicycle riding, jogging, weight lifting, or aerobic exercise, when you feel able to do so. Most women are able to return to work the day after the procedure. You may have some light vaginal bleeding. Wear sanitary pads if needed. Do not douche or use tampons for 2 weeks or until your doctor says it is okay. Ask your doctor when it is okay for you to have sex--usually after bleeding has stopped. Use condoms to prevent pregnancy if necessary. Diet  You can eat your normal diet. If your stomach is upset, try bland, low-fat foods like plain rice, broiled chicken, toast, and yogurt. Drink fluids as you would normally. Medicines  Take pain medicines exactly as directed. If the doctor gave you a prescription medicine for pain, take it as prescribed. If you are not taking a prescription pain medicine, take Aleve or Advil as needed unless you are allergic. If you think your pain medicine is making you sick to your stomach: Take your medicine with food. Ask your doctor for a different pain medicine.   If your doctor prescribed antibiotics, take them as directed. Do not stop taking them just because you feel better. Follow-up appointment is usually 2 or 3 weeks after surgery. Be sure to make an appointment if you don't have one already, and call your doctor if you are having problems. It's also a good idea to know your test results and keep a list of the medicines you take. When should you call for help? Call 911 anytime you think you may need emergency care. For example, call if:  You passed out (lost consciousness). You have severe trouble breathing. You have chest pain and shortness of breath, or you cough up blood. You have severe pain in your belly. Call your doctor now or seek immediate medical care if:  You have bright red vaginal bleeding that soaks one or more pads each hour for 2 or more hours. You pass blood clots that are larger than a golf ball. You have vaginal discharge that smells bad. You are sick to your stomach or cannot keep fluids down. You have pain that does not get better after you take pain medicine. You have pain that is getting worse 2 days after the procedure. You have a fever over 100°F.  Your belly feels tender, or full and hard. DISCHARGE SUMMARY from your Nurse      PATIENT INSTRUCTIONS    After general anesthesia or intravenous sedation, for 24 hours or while taking prescription Narcotics:  Limit your activities  Do not drive and operate hazardous machinery  Do not make important personal or business decisions  Do  not drink alcoholic beverages  If you have not urinated within 8 hours after discharge, please contact your surgeon on call.     Report the following to your surgeon:  Excessive pain, swelling, redness or odor of or around the surgical area  Temperature over 100.5  Nausea and vomiting lasting longer than 4 hours or if unable to take medications  Any signs of decreased circulation or nerve impairment to extremity: change in color, persistent  numbness, tingling, coldness or increase pain  Any questions      GOOD HELP TO FIGHT AN INFECTION  Here are a few tip to help reduce the chance of getting an infection after surgery:  Wash Your Hands  Good handwashing is the most important thing you and your caregiver can do. Wash before and after caring for any wounds. Dry your hand with a clean towel. Wash with soap and water for at least 20 seconds. A TIP: sing the \"Happy Birthday\" song through one time while washing to help with the timing. Use a hand  in between washings. Shower  When your surgeon says it is OK to take a shower, use a new bar of antibacterial soap (if that is what you use, and keep that bar of soap ONLY for your use), or antibacterial body wash. Use a clean wash cloth or sponge when you bathe. Dry off with a clean towel  after every bath - be careful around any wounds, skin staples, sutures or surgical glue over/on wounds. Do not enter swimming pools, hot tubs, lakes, rivers and/or ocean until wounds are healed and your doctor/surgeon says it is OK. Use Clean Sheets  Sleep on freshly laundered sheets after your surgery. Keep the surgery site covered with a clean, dry bandage (if instructed to do so). If the bandage becomes soiled, reapply a new, dry, clean bandage. Do not allow pets to sleep with you while your wound is healing. Lifestyle Modification and Controlling Your Blood Sugar  Smoking slows wound healing. Stop smoking and limit exposure to second-hand smoke. High blood sugar slows wound healing. Eat a well-balanced diet to provide proper nutrition while healing  Monitor your blood sugar (if you are a diabetic) and take your medications as you are suppose to so you can control you blood sugar after surgery. COUGH AND DEEP BREATHE    Breathing deeply and coughing are very important exercises to do after surgery. Deep breathing and coughing open the little air tubes and air sacks in your lungs. You take deep breaths every day.   You may not even notice - it is just something you do when you sigh or yawn. It is a natural exercise you do to keep these air passages open. After surgery, take deep breaths and cough, on purpose. DIRECTIONS:  Take 10 to 15 slow deep breaths every hour while awake. Breathe in deeply, and hold it for 2 seconds. Exhale slowly through puckered lips, like blowing up a balloon. After every 4th or 5th deep breath, hug your pillow to your chest or belly and give a hard, deep cough. Yes, it will probably hurt. But doing this exercise is a very important part of healing after surgery. Take your pain medicine to help you do this exercise without too much pain. Coughing and deep breathing help prevent bronchitis and pneumonia after surgery. If you had chest or belly surgery, use a pillow as a \"hug shashi\" and hold it tightly to your chest or belly when you cough. ANKLE PUMPS    Ankle pumps increase the circulation of oxygenated blood to your lower extremities and decrease your risk for circulation problems such as blood clots. They also stretch the muscles, tendons and ligaments in your foot and ankle, and prevent joint contracture in the ankle and foot, especially after surgeries on the legs. It is important to do ankle pump exercises regularly after surgery because immobility increases your risk for developing a blood clot. Your doctor may also have you take an Aspirin for the next few days as well. If your doctor did not ask you to take an Aspirin, consult with him before starting Aspirin therapy on your own. The exercise is quite simple. Slowly point your foot forward, feeling the muscles on the top of your lower leg stretch, and hold this position for 5 seconds. Next, pull your foot back toward you as far as possible, stretching the calf muscles, and hold that position for 5 seconds. Repeat with the other foot.   Perform 10 repetitions every hour while awake for both ankles if possible (down and then up with the foot once is one repetition). You should feel gentle stretching of the muscles in your lower leg when doing this exercise. If you feel pain, or your range of motion is limited, don't push too hard. Only go the limit your joint and muscles will let you go. If you have increasing pain, progressively worsening leg warmth or swelling, STOP the exercise and call your doctor. MEDICATION AND   SIDE EFFECT GUIDE    The [address] [place] [address] MEDICATION AND SIDE EFFECT GUIDE was provided to the PATIENT AND CARE PROVIDER. Information provided includes instruction about drug purpose and common side effects for the following medications:   ***        These are general instructions for a healthy lifestyle:    *   Please give a list of your current medications to your Primary Care Provider. *   Please update this list whenever your medications are discontinued, doses are changed, or new medications (including over-the-counter products) are added. *   Please carry medication information at all times in case of emergency situations. About Smoking  No smoking / No tobacco products  Avoid exposure to second hand smoke     Surgeon General's Warning:  Quitting smoking now greatly reduces serious risk to your health. Obesity, smoking, and sedentary lifestyle greatly increases your risk for illness and disease. A healthy diet, regular physical exercise & weight monitoring are important for maintaining a healthy lifestyle. Congestive Heart Failure  You may be retaining fluid if you have a history of heart failure or if you experience any of the following symptoms:  Weight gain of 3 pounds or more overnight or 5 pounds in a week, increased swelling in your hands or feet or shortness of breath while lying flat in bed. Please call your doctor as soon as you notice any of these symptoms; do not wait until your next office visit.       Recognize signs and symptoms of STROKE:  F -  Face looks uneven  A -  Arms unable to move or move evenly  S -  Speech slurred or non-existent  T -  Time-call 911 as soon as signs and symptoms begin-DO NOT go          back to bed or wait to see if you get better-TIME IS BRAIN. Warning Signs of HEART ATTACK   Call 911 if you have these symptoms:    Chest discomfort. Most heart attacks involve discomfort in the center of the chest that lasts more than a few minutes, or that goes away and comes back. It can feel like uncomfortable pressure, squeezing, fullness, or pain. Discomfort in other areas of the upper body. Symptoms can include pain or discomfort in one or both arms, the back, neck, jaw, or stomach. Shortness of breath with or without chest discomfort. Other signs may include breaking out in a cold sweat, nausea, or lightheadedness. Don't wait more than five minutes to call 911 - MINUTES MATTER! Fast action can save your life. Calling 911 is almost always the fastest way to get lifesaving treatment. Emergency Medical Services staff can begin treatment when they arrive -- up to an hour sooner than if someone gets to the hospital by car. Learning About Coronavirus [phone])  Coronavirus [phone]): Overview  What is coronavirus (COVID-19)? The coronavirus disease (COVID-19) is caused by a virus. It is an illness that was first found in [place], [place], in December 2019. It has since spread worldwide. The virus can cause fever, cough, and trouble breathing. In severe cases, it can cause pneumonia and make it hard to breathe without help. It can cause death. Coronaviruses are a large group of viruses. They cause the common cold. They also cause more serious illnesses like Middle East respiratory syndrome (MERS) and severe acute respiratory syndrome (SARS). COVID-19 is caused by a novel coronavirus. That means it's a new type that has not been seen in people before.   This virus spreads person-to-person through droplets from coughing and sneezing. It can also spread when you are close to someone who is infected. And it can spread when you touch something that has the virus on it, such as a doorknob or a tabletop. What can you do to protect yourself from coronavirus (COVID-19)? The best way to protect yourself from getting sick is to: Avoid areas where there is an outbreak. Avoid contact with people who may be infected. Wash your hands often with soap or alcohol-based hand sanitizers. Avoid crowds and try to stay at least 6 feet away from other people. Wash your hands often, especially after you cough or sneeze. Use soap and water, and scrub for at least 20 seconds. If soap and water aren't available, use an alcohol-based hand . Avoid touching your mouth, nose, and eyes. What can you do to avoid spreading the virus to others? To help avoid spreading the virus to others:  Cover your mouth with a tissue when you cough or sneeze. Then throw the tissue in the trash. Use a disinfectant to clean things that you touch often. Stay home if you are sick or have been exposed to the virus. Don't go to school, work, or public areas. And don't use public transportation. If you are sick:  Leave your home only if you need to get medical care. But call the doctor's office first so they know you're coming. And wear a face mask, if you have one. If you have a face mask, wear it whenever you're around other people. It can help stop the spread of the virus when you cough or sneeze. Clean and disinfect your home every day. Use household  and disinfectant wipes or sprays. Take special care to clean things that you grab with your hands. These include doorknobs, remote controls, phones, and handles on your refrigerator and microwave. And don't forget countertops, tabletops, bathrooms, and computer keyboards. When to call for help  Call 911 anytime you think you may need emergency care.  For example, call if:  You have severe trouble breathing. (You can't talk at all.)  You have constant chest pain or pressure. You are severely dizzy or lightheaded. You are confused or can't think clearly. Your face and lips have a blue color. You pass out (lose consciousness) or are very hard to wake up. Call your doctor now if you develop symptoms such as:  Shortness of breath. Fever. Cough. If you need to get care, call ahead to the doctor's office for instructions before you go. Make sure you wear a face mask, if you have one, to prevent exposing other people to the virus. Where can you get the latest information? The following health organizations are tracking and studying this virus. Their websites contain the most up-to-date information. Bubba Solers also learn what to do if you think you may have been exposed to the virus. U.S. Centers for Disease Control and Prevention (CDC): The CDC provides updated news about the disease and travel advice. The website also tells you how to prevent the spread of infection. www.cdc.gov  World Health Organization [facility]): WHO offers information about the virus outbreaks. WHO also has travel advice. www.who.int  Current as of: April 1, 2020               Content Version: 12.4  © 2006-2020 [employer]. Care instructions adapted under license by your healthcare professional. If you have questions about a medical condition or this instruction, always ask your healthcare professional. [address] any warranty or liability for your use of this information. The discharge information has been reviewed with the {PATIENT PARENT GUARDIAN:30841}. Any questions and concerns from the {PATIENT PARENT GUARDIAN:90980} have been addressed. The {PATIENT PARENT GUARDIAN:01867} verbalized understanding.         CONTENTS FOUND IN YOUR DISCHARGE ENVELOPE:  [x]     Surgeon and Hospital Discharge Instructions  [x]     [facility] Care Provider Card  []     Medication & Side Effect Guide            (your newly prescribed medications have been marked/highlighted showing the most common side effects from   the classes of drugs on your prescriptions)  []     Medication Prescription(s) x *** ( [] These have been sent electronically to your pharmacy by your surgeon,   - OR -       your surgeon has already provided these to you during a previous/pre-op office visit)  []     [address]  []     Physical Therapy Prescription  []     Follow-up Appointment Cards  []     Surgery-related Pictures/Media  []     Pain block and/or block with On-Q Catheter from Anesthesia Service (information included in your instructions above)  []     Medical device information sheets/pamphlets from their    []     School/work excuse note. []     /parent work excuse note. The following personal items collected during your admission are returned to you:   Dental Appliance: Dental Appliances: None  Vision:    Hearing Aid:    Jewelry: Jewelry: None  Clothing: Clothing: Undergarments, Footwear, Jacket/Coat, Pants, Shirt  Other Valuables:  Other Valuables: None  Valuables sent to safe:

## 2023-02-16 NOTE — H&P
Gynecology History and Physical    Name: Ros Benavides MRN: [id number] [id number]: [id number]    YOB: 1985  Age: 40 y.o. Sex: female       Subjective:      Chief complaint: high grade pap abnormality, inadequate colposcopy    Marlena Munoz is a 40 y.o.  female with a history of HSIL. Previous evaluation has been done with colpo, which revealed a white lesion extending into the endocervix which could not be adequately evaluated. Previous treatment has consisted of none. She is now admitted for outpatient surgery consisting of Procedure(s) (LRB):  LOOP ELECTRODE EXCISION PROCEDURE OF CERVIX LEEP (N/A). The current method of family planning is condoms sometimes. OB History          8    Para   6    Term   4       2    AB   2    Living   6         SAB        IAB   2    Ectopic        Molar        Multiple   0    Live Births   6              Past Medical History:   Diagnosis Date    Abnormal Pap smear 11    LGSIL + HPV    Abnormal Papanicolaou smear of cervix     ADHD (attention deficit hyperactivity disorder)     Asthma     inhaler prn    Atypical squamous cells of undetermined significance (ASCUS) on Papanicolaou smear of cervix 22, 2020    Cervical high risk HPV (human papillomavirus) test positive 22, 2020,2018    HPV test positive 11    Low grade squamous intraepithelial lesion (LGSIL) on Papanicolaou smear of cervix 2018    Postpartum depression     Psychiatric problem     drug addiction- opiates, initial dependency years ago. .methadone [place]    Trichomonas infection 2014     Past Surgical History:   Procedure Laterality Date    HX COLPOSCOPY  2020    no bx due to pregnancy    HX DILATION AND CURETTAGE      HX TONSILLECTOMY      HX WISDOM TEETH EXTRACTION      MS  DELIVERY ONLY       Social History     Occupational History    Not on file   Tobacco Use    Smoking status: Every Day     Packs/day: 1.00 Years: 15.00     Pack years: 15.00     Types: Cigarettes     Start date: 10/1/2004    Smokeless tobacco: Never    Tobacco comments:     Want to quit but find it EXTREMELY HARD   Vaping Use    Vaping Use: Not on file   Substance and Sexual Activity    Alcohol use: Not Currently     Alcohol/week: 0.0 standard drinks    Drug use: Yes     Types: Other, Cocaine, Heroin     Comment: subutex    Sexual activity: Yes     Partners: Male     Birth control/protection: None     Family History   Problem Relation Age of Onset    Drug Abuse Mother     Migraines Mother     Drug Abuse Father     OSTEOARTHRITIS Maternal Grandmother     Asthma Brother         Allergies   Allergen Reactions    Cephalosporins Hives    Codeine Hives    Pcn [Penicillins] Hives     Prior to Admission medications    Medication Sig Start Date End Date Taking? Authorizing Provider   buprenorphine HCl (SUBUTEX) 8 mg sublingual tablet 8 mg by SubLINGual route three (3) times daily. Indications: prevention of relapse to opioid dependence   Yes Provider, Historical   PNV No12-Iron-FA-DSS-OM-3 29 mg iron-1 mg -50 mg CPKD Take  by mouth. Yes Provider, Historical   dextroamphetamine-amphetamine (ADDERALL) 20 mg tablet Take 30 mg by mouth two (2) times a day.  Indications: attention deficit disorder with hyperactivity   Yes Provider, Historical   albuterol (PROVENTIL HFA, VENTOLIN HFA, PROAIR HFA) 90 mcg/actuation inhaler Take 1-2 Puffs by inhalation every four (4) hours as needed for Wheezing. 5/27/19   Bjorn Nash NP        Review of Systems:  History obtained from the patient-negative for:  Constitutional: weight loss, fever, night sweats  HEENT: hearing loss, earache, congestion, snoring, sorethroat  CV: chest pain, palpitations, edema  Resp: cough, shortness of breath, wheezing  Breast: breast lumps, nipple discharge, galactorrhea  GI: change in bowel habits, abdominal pain, black or bloody stools  : frequency, dysuria, hematuria, vaginal discharge  MSK: back pain, joint pain, muscle pain  Skin: itching, rash, hives  Neuro: dizziness, headache, confusion, weakness  Psych: anxiety, depression, change in mood  Heme/lymph: bleeding, bruising, pallor         Objective:     Vitals:    02/16/23 0833   BP: 108/66   Pulse: 67   Resp: 13   Temp: 98.2 °F (36.8 °C)   SpO2: 100%   Weight: 124 lb 9 oz (56.5 kg)   Height: 5' 3\" (1.6 m)       Physical Exam:  Heart: Regular rate and rhythm  Lung: clear to auscultation throughout lung fields, no wheezes, no rales, no rhonchi, and normal respiratory effort  Abdomen: soft, nontender  External Genitalia: normal general appearance  Vagina: normal mucosa without prolapse or lesions  Cervix: normal appearance  Adnexa: normal bimanual exam  Uterus: normal single, nontender    Assessment:   HSIL. Inadequate colposcopy. Plan:     Procedure(s) (LRB):  LOOP ELECTRODE EXCISION PROCEDURE OF CERVIX LEEP (N/A)  Discussed the risks of surgery including the risks of bleeding, infection, deep vein thrombosis, and surgical injuries to internal organs including but not limited to the bowels, bladder, rectum, and female reproductive organs. The patient understands the risks; any and all questions were answered to the patient's satisfaction.     Signed By:  Cristian Smart MD     February 16, 2023

## 2023-02-16 NOTE — ANESTHESIA PREPROCEDURE EVALUATION
Relevant Problems   No relevant active problems       Anesthetic History   No history of anesthetic complications            Review of Systems / Medical History  Patient summary reviewed and pertinent labs reviewed    Pulmonary            Asthma        Neuro/Psych         Psychiatric history    Comments: AD[facility] former substance abuse. Anxious.  Cardiovascular                  Exercise tolerance: >4 METS     GI/Hepatic/Renal  Within defined limits              Endo/Other  Within defined limits           Other Findings              Physical Exam    Airway  Mallampati: I  TM Distance: 4 - 6 cm  Neck ROM: normal range of motion   Mouth opening: Normal     Cardiovascular    Rhythm: regular  Rate: normal         Dental  No notable dental hx       Pulmonary  Breath sounds clear to auscultation               Abdominal  GI exam deferred       Other Findings            Anesthetic Plan    ASA: 3  Anesthesia type: general          Induction: Intravenous  Anesthetic plan and risks discussed with: Patient

## 2023-03-04 ENCOUNTER — PATIENT MESSAGE (OUTPATIENT)
Dept: OBGYN CLINIC | Age: 38
End: 2023-03-04

## 2023-03-07 NOTE — TELEPHONE ENCOUNTER
40year old patient had leep procedure done on 2/16/2023 Preoperative Diagnosis: HIGH GRADE SQUAMOUS INTRA EPITHELIAL LESION, MODERATE CERVICAL INTRA EPITHELIAL NEOPLASIA      Patient advised of MD reviewed lab results and set appointment for postop visit on 3/9/2023 at 3:50Pm    Patient verbalized understanding. Magdi Robertson MD   2/26/2023 10:43 AM EST       Tell pt Path shows no cancer.   Will review at postop check       Patient Communication     Released

## 2023-04-23 PROBLEM — Z98.891 S/P REPEAT LOW TRANSVERSE C-SECTION: Status: RESOLVED | Noted: 2022-06-06 | Resolved: 2023-04-23

## 2023-04-23 PROBLEM — Z34.80 ENCOUNTER FOR SUPERVISION OF OTHER NORMAL PREGNANCY, UNSPECIFIED TRIMESTER: Status: RESOLVED | Noted: 2020-07-30 | Resolved: 2023-04-23

## 2023-04-23 PROBLEM — D06.9 HIGH GRADE SQUAMOUS INTRAEPITHELIAL LESION (HGSIL), GRADE 3 CIN, ON BIOPSY OF CERVIX: Status: ACTIVE | Noted: 2023-01-01

## 2023-04-23 PROBLEM — D06.9 HIGH GRADE SQUAMOUS INTRAEPITHELIAL LESION (HGSIL), GRADE 3 CIN, ON BIOPSY OF CERVIX: Status: ACTIVE | Noted: 2023-02-16

## 2023-04-24 NOTE — PROGRESS NOTES
Azra Mcmillan is a 40 y.o. female presents for a problem visit. Chief Complaint   Patient presents with    Insertion Iud       Problems:  IUD insertion      Patient's last menstrual period was 04/22/2023. Birth Control: none. Last Pap: abnormal HSIL HPV obtained 1/9/2023. LEEP 2/16/2023.          1. Have you been to the ER, urgent care clinic, or hospitalized since your last visit? No    2. Have you seen or consulted any other health care providers outside of the [address] [place] [address] since your last visit?  No    Device number [id number], expiration date 04/30/2025    Chart reviewed for the following:   Benjie YUSUF [place], have reviewed the History, Physical and updated the Allergic reactions for [facility] [employer] performed immediately prior to start of procedure:   Benjie YUSUF [place], have performed the following reviews on Azra Mcmillan prior to the start of the procedure:            * Patient was identified by name and date of birth   * Agreement on procedure being performed was verified  * Risks and Benefits explained to the patient  * Procedure site verified and marked as necessary  * Patient was positioned for comfort  * Consent was signed and verified     Time: 2:45pm    Date of procedure: 4/25/2023    Procedure performed by:  Monica Hernandez MD    Provider assisted by: Tip Krishna MA    Patient assisted by: self    How tolerated by patient: tolerated the procedure well with no complications    Post Procedural Pain Scale: 2 - Hurts Little Bit    Comments: none    Examination chaperoned by Benjie Miller MA.

## 2023-04-25 ENCOUNTER — OFFICE VISIT (OUTPATIENT)
Dept: OBGYN CLINIC | Age: 38
End: 2023-04-25
Payer: MEDICAID

## 2023-04-25 VITALS
SYSTOLIC BLOOD PRESSURE: 118 MMHG | DIASTOLIC BLOOD PRESSURE: 72 MMHG | BODY MASS INDEX: 22.25 KG/M2 | WEIGHT: 125.6 LBS | HEIGHT: 63 IN

## 2023-04-25 DIAGNOSIS — Z30.430 ENCOUNTER FOR INSERTION OF MIRENA IUD: Primary | ICD-10-CM

## 2023-04-25 LAB
HCG URINE, QL. (POC): NEGATIVE
VALID INTERNAL CONTROL?: YES

## 2023-04-25 PROCEDURE — 58300 INSERT INTRAUTERINE DEVICE: CPT | Performed by: OBSTETRICS & GYNECOLOGY

## 2023-04-25 PROCEDURE — 81025 URINE PREGNANCY TEST: CPT | Performed by: OBSTETRICS & GYNECOLOGY

## 2023-04-25 RX ORDER — BUPRENORPHINE HYDROCHLORIDE, NALOXONE HYDROCHLORIDE 8; 2 MG/1; MG/1
FILM, SOLUBLE BUCCAL; SUBLINGUAL
COMMUNITY
Start: 2023-03-07

## 2023-04-25 RX ORDER — MICONAZOLE NITRATE 2 %
CREAM (GRAM) TOPICAL
COMMUNITY
Start: 2023-03-07

## 2023-04-25 RX ORDER — IBUPROFEN 200 MG
TABLET ORAL
COMMUNITY
Start: 2023-03-07

## 2023-04-25 RX ORDER — IBUPROFEN 800 MG/1
TABLET ORAL
COMMUNITY
Start: 2023-02-22

## 2023-04-25 NOTE — PROGRESS NOTES
Mirena IUD INSERTION  Indications:  Benoit Lubin is a ,  40 y.o. female [address] [place] Patient's last menstrual period was 2023. Her LMP was normal in duration and amount of flow. She  presents for insertion of an IUD. The risks, benefits and alternatives of IUD insertion were discussed in detail at last visit. She also has reviewed Mirena information. She has elected to proceed with the insertion today and she states she has no further questions. A urine pregnancy test was negative No components found for: SPEP, UPEP  Procedure: The pelvic exam revealed normal external genitalia. On bimanual exam the uterus was anteverted and normal in size with no tenderness present. A speculum was inserted into the vagina and the cervix was visualized. The cervix was prepped with zephiran solution. The anterior lip of the cervix was grasped with a single toothed tenaculum. The uterus was sounded with a Benito sound to 7.5 centimeters. A Mirena was then inserted without difficulty. The string was cut to 3 centimeters. She experienced a mild  amount of cramping. Post Procedure Status:   She tolerated the procedure with mild discomfort. The patient was observed for 5 minutes after the insertion. There were no complications. Patient was discharged in stable condition. The patient received Mirena lot number [id number].

## 2023-07-07 ENCOUNTER — TELEPHONE (OUTPATIENT)
Age: 38
End: 2023-07-07

## 2023-07-07 NOTE — TELEPHONE ENCOUNTER
Two patient identifiers obtained    Patient had IUD placed on 4/25/2023, patient reports that she was able to feel strings but is now not able to. Patient reports cramping and consistent light bleeding. She has an appointment on July 31st for an IUD check and would like to know if she should be seen sooner or any other recommendation that may help. Message sent to MD for recommendations.

## 2023-07-10 ENCOUNTER — TELEPHONE (OUTPATIENT)
Age: 38
End: 2023-07-10

## 2023-07-10 NOTE — TELEPHONE ENCOUNTER
Patient notified of MD recommendations and US results.  Patient opted to keep appt on July 31st for IUD removal.

## 2023-08-28 ENCOUNTER — TELEPHONE (OUTPATIENT)
Age: 38
End: 2023-08-28

## 2023-08-28 NOTE — TELEPHONE ENCOUNTER
Two patient identifiers used  Spouse calling about appointment that patient missed to due to being late and had to go to another appointment at 3:00PM( could not miss)     advised for patient to call the office to reschedule appointment     advised he is not on the hippa form'

## 2024-03-26 ENCOUNTER — TELEPHONE (OUTPATIENT)
Facility: CLINIC | Age: 39
End: 2024-03-26

## 2024-03-26 NOTE — TELEPHONE ENCOUNTER
Called and spoke with patient about scheduling appt. She stated she has to look at her work calendar to see when she is off and will call back to schedule appt.

## 2024-03-26 NOTE — TELEPHONE ENCOUNTER
----- Message from Cheyenne Gant sent at 3/25/2024  3:08 PM EDT -----  Regarding: appointment request     With Provider: Heidy Vigil PA-C [-Primary Care Physician-]     Preferred Date Range: From 4/1/2024 To 4/1/2024     Preferred Times: Monday Morning, Monday Afternoon     Reason: To address the following health maintenance concerns.  Hepatitis B Vaccine  Hepatitis C Screen  Flu Vaccine     Comments:  I just need a yearly physical and maybe an order for blood work to get tested for everything.

## 2024-04-01 ENCOUNTER — OFFICE VISIT (OUTPATIENT)
Age: 39
End: 2024-04-01
Payer: COMMERCIAL

## 2024-04-01 VITALS
SYSTOLIC BLOOD PRESSURE: 137 MMHG | HEIGHT: 63 IN | WEIGHT: 123.8 LBS | BODY MASS INDEX: 21.93 KG/M2 | DIASTOLIC BLOOD PRESSURE: 78 MMHG

## 2024-04-01 DIAGNOSIS — Z01.419 ENCOUNTER FOR GYNECOLOGICAL EXAMINATION (GENERAL) (ROUTINE) WITHOUT ABNORMAL FINDINGS: Primary | ICD-10-CM

## 2024-04-01 DIAGNOSIS — Z01.419 ENCOUNTER FOR CERVICAL PAP SMEAR WITH PELVIC EXAM: ICD-10-CM

## 2024-04-01 PROCEDURE — 99395 PREV VISIT EST AGE 18-39: CPT | Performed by: OBSTETRICS & GYNECOLOGY

## 2024-04-01 SDOH — ECONOMIC STABILITY: HOUSING INSECURITY
IN THE LAST 12 MONTHS, WAS THERE A TIME WHEN YOU DID NOT HAVE A STEADY PLACE TO SLEEP OR SLEPT IN A SHELTER (INCLUDING NOW)?: NO

## 2024-04-01 SDOH — ECONOMIC STABILITY: FOOD INSECURITY: WITHIN THE PAST 12 MONTHS, YOU WORRIED THAT YOUR FOOD WOULD RUN OUT BEFORE YOU GOT MONEY TO BUY MORE.: NEVER TRUE

## 2024-04-01 SDOH — ECONOMIC STABILITY: INCOME INSECURITY: HOW HARD IS IT FOR YOU TO PAY FOR THE VERY BASICS LIKE FOOD, HOUSING, MEDICAL CARE, AND HEATING?: NOT HARD AT ALL

## 2024-04-01 SDOH — ECONOMIC STABILITY: FOOD INSECURITY: WITHIN THE PAST 12 MONTHS, THE FOOD YOU BOUGHT JUST DIDN'T LAST AND YOU DIDN'T HAVE MONEY TO GET MORE.: NEVER TRUE

## 2024-04-01 ASSESSMENT — PATIENT HEALTH QUESTIONNAIRE - PHQ9
SUM OF ALL RESPONSES TO PHQ QUESTIONS 1-9: 0
SUM OF ALL RESPONSES TO PHQ QUESTIONS 1-9: 0
SUM OF ALL RESPONSES TO PHQ9 QUESTIONS 1 & 2: 0
SUM OF ALL RESPONSES TO PHQ QUESTIONS 1-9: 0
1. LITTLE INTEREST OR PLEASURE IN DOING THINGS: NOT AT ALL
SUM OF ALL RESPONSES TO PHQ QUESTIONS 1-9: 0
2. FEELING DOWN, DEPRESSED OR HOPELESS: NOT AT ALL

## 2024-04-01 NOTE — PROGRESS NOTES
Janice Myers is a 38 y.o. female returns for an annual exam     No chief complaint on file.      No LMP recorded.  Her periods are spotting in flow and minimal to none with hormone containing IUS..  She does not have dysmenorrhea.  Problems: problems - pt noticed an difference on her left breast since the birth of her child.  Birth Control: IUD.  Last Pap: abnormal obtained 2 year(s) ago.  She does not have a history of SLAVA 2, 3 or cervical cancer.   With regard to the Gardisil vaccine, she has not received it yet      1. Have you been to the ER, urgent care clinic, or hospitalized since your last visit? No    2. Have you seen or consulted any other health care providers outside of the [facility] since your last visit? No    Examination chaperoned by Kira Hernández LPN.

## 2024-04-01 NOTE — PROGRESS NOTES
Annual exam      Janice Myers is a No obstetric history on file.,  38 y.o. female   No LMP recorded. (Menstrual status: IUD).    She presents for her annual checkup.     She has noticed her left breast is different since her last child.    Menstrual status:    Her periods are spotting in flow and minimal to none with hormone containing IUS.  She does not have dysmenorrhea.    Sexual history:    She  reports being sexually active and has had partner(s) who are male. She reports using the following method of birth control/protection: I.U.D..    Per Nursing Note:  Last Pap: abnormal obtained 2 year(s) ago.  She does not have a history of SLAVA 2, 3 or cervical cancer.   With regard to the Gardisil vaccine, she has not received it yet    Past Medical History:   Diagnosis Date    ADHD (attention deficit hyperactivity disorder)     ASCUS with positive high risk HPV cervical 2022    Asthma     inhaler prn    Cervical high risk HPV (human papillomavirus) test positive 22, 2020,2018    Dysplasia of cervix, low grade (SLAVA 1) 2011    LGSIL + HPV    3/30/18    Opiate addiction (HCC)     past.  Methadone    Postpartum depression     S/P repeat low transverse  2022    Trichomonas infection 2014     Past Surgical History:   Procedure Laterality Date     SECTION  2020     SECTION  2018     SECTION  2022     SECTION  2012     SECTION  09/10/2008    DILATION AND CURETTAGE OF UTERUS      TAB x2    TONSILLECTOMY      WISDOM TOOTH EXTRACTION         Current Outpatient Medications   Medication Sig Dispense Refill    albuterol sulfate HFA (PROVENTIL;VENTOLIN;PROAIR) 108 (90 Base) MCG/ACT inhaler Inhale 1-2 puffs into the lungs every 4 hours as needed      amphetamine-dextroamphetamine (ADDERALL) 20 MG tablet Take by mouth 2 times daily.      buprenorphine (SUBUTEX) 8 MG SUBL SL tablet Place under the tongue 3 times

## 2024-04-02 ENCOUNTER — TELEPHONE (OUTPATIENT)
Facility: CLINIC | Age: 39
End: 2024-04-02

## 2024-04-02 NOTE — TELEPHONE ENCOUNTER
----- Message from Cheyenne Gant sent at 3/25/2024  3:08 PM EDT -----  Regarding: appointment request     With Provider: Heidy Vigli PA-C [-Primary Care Physician-]     Preferred Date Range: From 4/1/2024 To 4/1/2024     Preferred Times: Monday Morning, Monday Afternoon     Reason: To address the following health maintenance concerns.  Hepatitis B Vaccine  Hepatitis C Screen  Flu Vaccine     Comments:  I just need a yearly physical and maybe an order for blood work to get tested for everything.

## 2024-04-03 NOTE — TELEPHONE ENCOUNTER
Called patient to schedule appointment.  Left message to call with gentleman who answered the phone to have her call.

## 2024-04-06 LAB
CYTOLOGIST CVX/VAG CYTO: NORMAL
CYTOLOGY CVX/VAG DOC CYTO: NORMAL
CYTOLOGY CVX/VAG DOC THIN PREP: NORMAL
DX ICD CODE: NORMAL
HPV GENOTYPE REFLEX: NORMAL
HPV I/H RISK 4 DNA CVX QL PROBE+SIG AMP: NEGATIVE
Lab: NORMAL
Lab: NORMAL
OTHER STN SPEC: NORMAL
STAT OF ADQ CVX/VAG CYTO-IMP: NORMAL

## 2024-09-30 ENCOUNTER — PATIENT MESSAGE (OUTPATIENT)
Age: 39
End: 2024-09-30

## 2024-09-30 NOTE — TELEPHONE ENCOUNTER
You should take macrobid for UTI's.  The Mirena is fine if you are not having any problems.  The cyst on the larger ovary makes its overall size bigger but it is not significant.

## (undated) DEVICE — TIP CLEANER: Brand: VALLEYLAB

## (undated) DEVICE — C-SECTION II-LF: Brand: MEDLINE INDUSTRIES, INC.

## (undated) DEVICE — GOWN,AURORA,FABRIC-REINFORCED,X-LARGE: Brand: MEDLINE

## (undated) DEVICE — REM POLYHESIVE ADULT PATIENT RETURN ELECTRODE: Brand: VALLEYLAB

## (undated) DEVICE — ALCOHOL RUBBING ISO 16OZ 70%

## (undated) DEVICE — SYRINGE IRRIG 60ML SFT PLIABLE BLB EZ TO GRP 1 HND USE W/

## (undated) DEVICE — TELFA ADHESIVE ISLAND DRESSING: Brand: TELFA

## (undated) DEVICE — BLADE ASSEMB CLP HAIR FINE --

## (undated) DEVICE — 3000CC GUARDIAN II: Brand: GUARDIAN

## (undated) DEVICE — ROCKER SWITCH PENCIL HOLSTER: Brand: VALLEYLAB

## (undated) DEVICE — STAPLER SKIN H3.9MM WIRE DIA0.58MM CRWN 6.9MM 35 STPL ROT

## (undated) DEVICE — BULB SYRINGE, IRRIGATION WITH PROTECTIVE CAP, 60 CC, INDIVIDUALLY WRAPPED: Brand: DOVER

## (undated) DEVICE — TOWEL,OR,DSP,ST,BLUE,STD,2/PK,40PK/CS: Brand: MEDLINE

## (undated) DEVICE — 3M™ IOBAN™ 2 ANTIMICROBIAL INCISE DRAPE 6648EZ: Brand: IOBAN™ 2

## (undated) DEVICE — SUTURE CHROMIC GUT SZ 1 L36IN ABSRB BRN L40MM CT 1/2 CIR 915H

## (undated) DEVICE — SUTURE MCRYL SZ 0 L36IN ABSRB UD L36MM CT-1 1/2 CIR Y946H

## (undated) DEVICE — SLEEVE COMPR STD 12 IN FOR 165IN CALF COMFORT VENODYNE SYS

## (undated) DEVICE — STERILE POLYISOPRENE POWDER-FREE SURGICAL GLOVES: Brand: PROTEXIS

## (undated) DEVICE — STERILE LATEX POWDER-FREE SURGICAL GLOVESWITH NITRILE COATING: Brand: PROTEXIS

## (undated) DEVICE — SUTURE PDS II SZ 1 L36IN ABSRB VLT CT L40MM 1/2 CIR TAPR Z359T

## (undated) DEVICE — TRAY,URINE METER,100% SILICONE,16FR10ML: Brand: MEDLINE

## (undated) DEVICE — GLOVE SURG SZ 6 THK91MIL LTX FREE SYN POLYISOPRENE ANTI

## (undated) DEVICE — SYR 10ML CTRL LR LCK NSAF LF --

## (undated) DEVICE — NEEDLE SPNL 22GA L3.5IN BLK HUB S STL REG WALL FIT STYL W/

## (undated) DEVICE — STERILE POLYISOPRENE POWDER-FREE SURGICAL GLOVES WITH EMOLLIENT COATING: Brand: PROTEXIS

## (undated) DEVICE — GAUZE,SPONGE,4"X4",16PLY,XRAY,STRL,LF: Brand: MEDLINE

## (undated) DEVICE — GLOVE ORANGE PI 7   MSG9070

## (undated) DEVICE — ELECTRODE ES L11CM DIA5MM BALL SHFT RED DISP UTAHLOOP

## (undated) DEVICE — BLADE SURG UNIV BLUE --

## (undated) DEVICE — GARMENT,MEDLINE,DVT,INT,CALF,MED, GEN2: Brand: MEDLINE

## (undated) DEVICE — X-RAY DETECTABLE SPONGES,16 PLY: Brand: VISTEC

## (undated) DEVICE — SOLUTION IV 1000ML 0.9% SOD CHL

## (undated) DEVICE — HANDLE LT SNAP ON ULT DURABLE LENS FOR TRUMPF ALC DISPOSABLE

## (undated) DEVICE — SUTURE STRATAFIX SYMMETRIC PDS + SZ 1 L18IN ABSRB VLT L48MM SXPP1A400

## (undated) DEVICE — LLETZ LOOP ELECTRODE, 20MM WIDE X 10MM DEEP, 11.8 CM SHAFT, RED: Brand: MEGADYNE

## (undated) DEVICE — SOLIDIFIER FLUID 3000 CC ABSORB

## (undated) DEVICE — PERI GYN-SFMC: Brand: MEDLINE INDUSTRIES, INC.

## (undated) DEVICE — TUBING, SUCTION, 1/4" X 10', STRAIGHT: Brand: MEDLINE

## (undated) DEVICE — CANISTER, RIGID, 3000CC: Brand: MEDLINE INDUSTRIES, INC.

## (undated) DEVICE — (D)SOL MEDC ALC ISO 70% 16OZ -- CONVERT TO ITEM 364515

## (undated) DEVICE — ROCKER SWITCH PENCIL BLADE ELECTRODE, HOLSTER: Brand: EDGE

## (undated) DEVICE — STRAP,POSITIONING,KNEE/BODY,FOAM,4X60": Brand: MEDLINE